# Patient Record
Sex: FEMALE | Race: WHITE | Employment: FULL TIME | URBAN - METROPOLITAN AREA
[De-identification: names, ages, dates, MRNs, and addresses within clinical notes are randomized per-mention and may not be internally consistent; named-entity substitution may affect disease eponyms.]

---

## 2017-01-01 ENCOUNTER — OFFICE VISIT (OUTPATIENT)
Dept: FAMILY MEDICINE | Facility: CLINIC | Age: 62
End: 2017-01-01
Payer: COMMERCIAL

## 2017-01-01 ENCOUNTER — THERAPY VISIT (OUTPATIENT)
Dept: PHYSICAL THERAPY | Facility: CLINIC | Age: 62
End: 2017-01-01
Payer: COMMERCIAL

## 2017-01-01 ENCOUNTER — OFFICE VISIT (OUTPATIENT)
Dept: ORTHOPEDICS | Facility: CLINIC | Age: 62
End: 2017-01-01
Payer: COMMERCIAL

## 2017-01-01 ENCOUNTER — TRANSFERRED RECORDS (OUTPATIENT)
Dept: HEALTH INFORMATION MANAGEMENT | Facility: CLINIC | Age: 62
End: 2017-01-01

## 2017-01-01 ENCOUNTER — TELEPHONE (OUTPATIENT)
Dept: ORTHOPEDICS | Facility: CLINIC | Age: 62
End: 2017-01-01

## 2017-01-01 ENCOUNTER — ANESTHESIA (OUTPATIENT)
Dept: SURGERY | Facility: CLINIC | Age: 62
End: 2017-01-01
Payer: COMMERCIAL

## 2017-01-01 ENCOUNTER — SURGERY (OUTPATIENT)
Age: 62
End: 2017-01-01

## 2017-01-01 ENCOUNTER — HOSPITAL ENCOUNTER (OUTPATIENT)
Dept: MRI IMAGING | Facility: CLINIC | Age: 62
Discharge: HOME | End: 2017-07-06
Attending: ORTHOPAEDIC SURGERY | Admitting: ORTHOPAEDIC SURGERY
Payer: COMMERCIAL

## 2017-01-01 ENCOUNTER — ANESTHESIA EVENT (OUTPATIENT)
Dept: SURGERY | Facility: CLINIC | Age: 62
End: 2017-01-01
Payer: COMMERCIAL

## 2017-01-01 ENCOUNTER — HOSPITAL ENCOUNTER (OUTPATIENT)
Facility: CLINIC | Age: 62
End: 2017-01-01
Attending: ORTHOPAEDIC SURGERY | Admitting: ORTHOPAEDIC SURGERY
Payer: COMMERCIAL

## 2017-01-01 ENCOUNTER — HOSPITAL ENCOUNTER (OUTPATIENT)
Dept: MAMMOGRAPHY | Facility: CLINIC | Age: 62
Discharge: HOME | End: 2017-11-03
Attending: FAMILY MEDICINE | Admitting: FAMILY MEDICINE
Payer: COMMERCIAL

## 2017-01-01 ENCOUNTER — TELEPHONE (OUTPATIENT)
Dept: FAMILY MEDICINE | Facility: CLINIC | Age: 62
End: 2017-01-01

## 2017-01-01 ENCOUNTER — HOSPITAL ENCOUNTER (OUTPATIENT)
Facility: CLINIC | Age: 62
Discharge: HOME | End: 2017-07-24
Attending: PATHOLOGY | Admitting: PATHOLOGY
Payer: COMMERCIAL

## 2017-01-01 ENCOUNTER — HEALTH MAINTENANCE LETTER (OUTPATIENT)
Age: 62
End: 2017-01-01

## 2017-01-01 ENCOUNTER — TELEPHONE (OUTPATIENT)
Dept: CARDIOLOGY | Facility: CLINIC | Age: 62
End: 2017-01-01

## 2017-01-01 ENCOUNTER — RADIANT APPOINTMENT (OUTPATIENT)
Dept: GENERAL RADIOLOGY | Facility: CLINIC | Age: 62
End: 2017-01-01
Attending: ORTHOPAEDIC SURGERY
Payer: COMMERCIAL

## 2017-01-01 VITALS
HEART RATE: 55 BPM | HEIGHT: 66 IN | SYSTOLIC BLOOD PRESSURE: 126 MMHG | DIASTOLIC BLOOD PRESSURE: 78 MMHG | OXYGEN SATURATION: 96 % | BODY MASS INDEX: 30.86 KG/M2 | WEIGHT: 192 LBS | TEMPERATURE: 97.8 F

## 2017-01-01 VITALS
WEIGHT: 192.9 LBS | HEART RATE: 61 BPM | OXYGEN SATURATION: 97 % | BODY MASS INDEX: 31 KG/M2 | HEIGHT: 66 IN | DIASTOLIC BLOOD PRESSURE: 78 MMHG | SYSTOLIC BLOOD PRESSURE: 124 MMHG | TEMPERATURE: 98 F

## 2017-01-01 VITALS
WEIGHT: 198 LBS | BODY MASS INDEX: 31.08 KG/M2 | HEIGHT: 67 IN | SYSTOLIC BLOOD PRESSURE: 126 MMHG | DIASTOLIC BLOOD PRESSURE: 70 MMHG

## 2017-01-01 VITALS
WEIGHT: 193 LBS | TEMPERATURE: 98.2 F | HEART RATE: 68 BPM | OXYGEN SATURATION: 99 % | DIASTOLIC BLOOD PRESSURE: 80 MMHG | BODY MASS INDEX: 30.29 KG/M2 | HEIGHT: 67 IN | SYSTOLIC BLOOD PRESSURE: 120 MMHG

## 2017-01-01 VITALS
RESPIRATION RATE: 15 BRPM | SYSTOLIC BLOOD PRESSURE: 126 MMHG | OXYGEN SATURATION: 97 % | BODY MASS INDEX: 30.29 KG/M2 | DIASTOLIC BLOOD PRESSURE: 75 MMHG | WEIGHT: 193 LBS | HEIGHT: 67 IN | HEART RATE: 58 BPM | TEMPERATURE: 98.1 F

## 2017-01-01 VITALS
WEIGHT: 198 LBS | TEMPERATURE: 98.2 F | BODY MASS INDEX: 31.01 KG/M2 | SYSTOLIC BLOOD PRESSURE: 138 MMHG | DIASTOLIC BLOOD PRESSURE: 82 MMHG | HEART RATE: 61 BPM | OXYGEN SATURATION: 97 %

## 2017-01-01 VITALS
DIASTOLIC BLOOD PRESSURE: 84 MMHG | OXYGEN SATURATION: 97 % | BODY MASS INDEX: 31.47 KG/M2 | SYSTOLIC BLOOD PRESSURE: 130 MMHG | WEIGHT: 200.9 LBS | TEMPERATURE: 97.5 F | HEART RATE: 60 BPM

## 2017-01-01 VITALS
HEIGHT: 67 IN | DIASTOLIC BLOOD PRESSURE: 84 MMHG | OXYGEN SATURATION: 97 % | HEART RATE: 84 BPM | SYSTOLIC BLOOD PRESSURE: 118 MMHG | WEIGHT: 200.9 LBS | BODY MASS INDEX: 31.53 KG/M2 | TEMPERATURE: 98.2 F

## 2017-01-01 VITALS
DIASTOLIC BLOOD PRESSURE: 82 MMHG | BODY MASS INDEX: 31.34 KG/M2 | WEIGHT: 195 LBS | SYSTOLIC BLOOD PRESSURE: 130 MMHG | HEIGHT: 66 IN

## 2017-01-01 VITALS — HEIGHT: 67 IN | WEIGHT: 198 LBS | BODY MASS INDEX: 31.08 KG/M2

## 2017-01-01 DIAGNOSIS — S46.219A BICEPS TENDON TEAR: ICD-10-CM

## 2017-01-01 DIAGNOSIS — D72.829 LEUKOCYTOSIS, UNSPECIFIED TYPE: ICD-10-CM

## 2017-01-01 DIAGNOSIS — I10 HYPERTENSION GOAL BP (BLOOD PRESSURE) < 140/90: ICD-10-CM

## 2017-01-01 DIAGNOSIS — C50.912 MALIGNANT NEOPLASM OF LEFT BREAST IN FEMALE, ESTROGEN RECEPTOR POSITIVE, UNSPECIFIED SITE OF BREAST (H): ICD-10-CM

## 2017-01-01 DIAGNOSIS — S46.112D RUPTURE LONG HEAD BICEPS TENDON, LEFT, SUBSEQUENT ENCOUNTER: ICD-10-CM

## 2017-01-01 DIAGNOSIS — B00.9 HSV (HERPES SIMPLEX VIRUS) INFECTION: ICD-10-CM

## 2017-01-01 DIAGNOSIS — J45.20 MILD INTERMITTENT ASTHMA WITHOUT COMPLICATION: ICD-10-CM

## 2017-01-01 DIAGNOSIS — Z01.818 PREOP GENERAL PHYSICAL EXAM: Primary | ICD-10-CM

## 2017-01-01 DIAGNOSIS — S46.112A LABRAL TEAR OF LONG HEAD OF BICEPS TENDON, LEFT, INITIAL ENCOUNTER: ICD-10-CM

## 2017-01-01 DIAGNOSIS — M25.512 ACUTE PAIN OF LEFT SHOULDER: Primary | ICD-10-CM

## 2017-01-01 DIAGNOSIS — I10 ESSENTIAL HYPERTENSION WITH GOAL BLOOD PRESSURE LESS THAN 140/90: ICD-10-CM

## 2017-01-01 DIAGNOSIS — M75.42 IMPINGEMENT SYNDROME OF LEFT SHOULDER: ICD-10-CM

## 2017-01-01 DIAGNOSIS — S40.022A HEMATOMA OF ARM, LEFT, INITIAL ENCOUNTER: ICD-10-CM

## 2017-01-01 DIAGNOSIS — E66.09 NON MORBID OBESITY DUE TO EXCESS CALORIES: ICD-10-CM

## 2017-01-01 DIAGNOSIS — D50.9 IRON DEFICIENCY ANEMIA, UNSPECIFIED IRON DEFICIENCY ANEMIA TYPE: ICD-10-CM

## 2017-01-01 DIAGNOSIS — Z12.11 SCREEN FOR COLON CANCER: ICD-10-CM

## 2017-01-01 DIAGNOSIS — S46.012A ROTATOR CUFF STRAIN, LEFT, INITIAL ENCOUNTER: ICD-10-CM

## 2017-01-01 DIAGNOSIS — M25.512 LEFT SHOULDER PAIN, UNSPECIFIED CHRONICITY: Primary | ICD-10-CM

## 2017-01-01 DIAGNOSIS — S46.002D ROTATOR CUFF INJURY, LEFT, SUBSEQUENT ENCOUNTER: ICD-10-CM

## 2017-01-01 DIAGNOSIS — M75.122 COMPLETE ROTATOR CUFF TEAR OR RUPTURE OF LEFT SHOULDER, NOT SPECIFIED AS TRAUMATIC: Primary | ICD-10-CM

## 2017-01-01 DIAGNOSIS — Z71.89 ADVANCED DIRECTIVES, COUNSELING/DISCUSSION: ICD-10-CM

## 2017-01-01 DIAGNOSIS — D75.839 THROMBOCYTOSIS: ICD-10-CM

## 2017-01-01 DIAGNOSIS — Z17.0 MALIGNANT NEOPLASM OF LEFT BREAST IN FEMALE, ESTROGEN RECEPTOR POSITIVE, UNSPECIFIED SITE OF BREAST (H): ICD-10-CM

## 2017-01-01 DIAGNOSIS — M75.122 COMPLETE TEAR OF LEFT ROTATOR CUFF: Primary | ICD-10-CM

## 2017-01-01 DIAGNOSIS — M75.122 COMPLETE ROTATOR CUFF TEAR OF LEFT SHOULDER: Primary | ICD-10-CM

## 2017-01-01 DIAGNOSIS — M25.512 ACUTE PAIN OF LEFT SHOULDER: ICD-10-CM

## 2017-01-01 DIAGNOSIS — Z13.6 CARDIOVASCULAR SCREENING; LDL GOAL LESS THAN 100: ICD-10-CM

## 2017-01-01 DIAGNOSIS — Z47.89 AFTERCARE FOLLOWING SURGERY OF THE MUSCULOSKELETAL SYSTEM: ICD-10-CM

## 2017-01-01 DIAGNOSIS — Z98.84 S/P GASTRIC BYPASS: ICD-10-CM

## 2017-01-01 DIAGNOSIS — Z00.01 ENCOUNTER FOR ROUTINE ADULT MEDICAL EXAM WITH ABNORMAL FINDINGS: Primary | ICD-10-CM

## 2017-01-01 DIAGNOSIS — D75.1 ERYTHROCYTOSIS: ICD-10-CM

## 2017-01-01 DIAGNOSIS — S46.212A STRAIN OF BICEPS TENDON, LEFT, INITIAL ENCOUNTER: Primary | ICD-10-CM

## 2017-01-01 DIAGNOSIS — D47.3 ESSENTIAL THROMBOCYTHEMIA (H): ICD-10-CM

## 2017-01-01 DIAGNOSIS — S46.212A STRAIN OF BICEPS TENDON, LEFT, INITIAL ENCOUNTER: ICD-10-CM

## 2017-01-01 DIAGNOSIS — J41.0 SIMPLE CHRONIC BRONCHITIS (H): ICD-10-CM

## 2017-01-01 DIAGNOSIS — Z12.31 VISIT FOR SCREENING MAMMOGRAM: ICD-10-CM

## 2017-01-01 LAB
ALBUMIN SERPL-MCNC: 3.4 G/DL (ref 3.4–5)
ALP SERPL-CCNC: 83 U/L (ref 40–150)
ALT SERPL W P-5'-P-CCNC: 15 U/L (ref 0–50)
ANION GAP SERPL CALCULATED.3IONS-SCNC: 14 MMOL/L (ref 3–14)
ANION GAP SERPL CALCULATED.3IONS-SCNC: 6 MMOL/L (ref 3–14)
AST SERPL W P-5'-P-CCNC: 17 U/L (ref 0–45)
BASOPHILS # BLD AUTO: 0.1 10E9/L (ref 0–0.2)
BASOPHILS # BLD AUTO: 0.2 10E9/L (ref 0–0.2)
BASOPHILS # BLD AUTO: 0.5 10E9/L (ref 0–0.2)
BASOPHILS NFR BLD AUTO: 0.8 %
BASOPHILS NFR BLD AUTO: 2.1 %
BASOPHILS NFR BLD AUTO: 3 %
BILIRUB SERPL-MCNC: 0.6 MG/DL (ref 0.2–1.3)
BUN SERPL-MCNC: 14 MG/DL (ref 7–30)
BUN SERPL-MCNC: 19 MG/DL (ref 7–30)
BURR CELLS BLD QL SMEAR: SLIGHT
CALCIUM SERPL-MCNC: 9.1 MG/DL (ref 8.5–10.1)
CALCIUM SERPL-MCNC: 9.7 MG/DL (ref 8.5–10.1)
CHLORIDE SERPL-SCNC: 96 MMOL/L (ref 94–109)
CHLORIDE SERPL-SCNC: 98 MMOL/L (ref 94–109)
CHOLEST SERPL-MCNC: 158 MG/DL
CO2 SERPL-SCNC: 22 MMOL/L (ref 20–32)
CO2 SERPL-SCNC: 31 MMOL/L (ref 20–32)
COPATH REPORT: NORMAL
CREAT SERPL-MCNC: 0.75 MG/DL (ref 0.52–1.04)
CREAT SERPL-MCNC: 0.76 MG/DL (ref 0.52–1.04)
CREAT UR-MCNC: 118 MG/DL
DIFFERENTIAL METHOD BLD: ABNORMAL
EOSINOPHIL # BLD AUTO: 0.2 10E9/L (ref 0–0.7)
EOSINOPHIL # BLD AUTO: 0.3 10E9/L (ref 0–0.7)
EOSINOPHIL # BLD AUTO: 0.5 10E9/L (ref 0–0.7)
EOSINOPHIL NFR BLD AUTO: 2.1 %
EOSINOPHIL NFR BLD AUTO: 2.6 %
EOSINOPHIL NFR BLD AUTO: 3 %
ERYTHROCYTE [DISTWIDTH] IN BLOOD BY AUTOMATED COUNT: 12.1 % (ref 10–15)
ERYTHROCYTE [DISTWIDTH] IN BLOOD BY AUTOMATED COUNT: 14.1 % (ref 10–15)
ERYTHROCYTE [DISTWIDTH] IN BLOOD BY AUTOMATED COUNT: 14.2 % (ref 10–15)
FOLATE SERPL-MCNC: 12.9 NG/ML
GFR SERPL CREATININE-BSD FRML MDRD: 78 ML/MIN/1.7M2
GFR SERPL CREATININE-BSD FRML MDRD: 79 ML/MIN/1.7M2
GLUCOSE SERPL-MCNC: 48 MG/DL (ref 70–99)
GLUCOSE SERPL-MCNC: 82 MG/DL (ref 70–99)
HCT VFR BLD AUTO: 42.9 % (ref 35–47)
HCT VFR BLD AUTO: 47.4 % (ref 35–47)
HCT VFR BLD AUTO: 47.9 % (ref 35–47)
HDLC SERPL-MCNC: 58 MG/DL
HGB BLD-MCNC: 15 G/DL (ref 11.7–15.7)
HGB BLD-MCNC: 16.1 G/DL (ref 11.7–15.7)
HGB BLD-MCNC: 16.5 G/DL (ref 11.7–15.7)
IMM GRANULOCYTES # BLD: 0.1 10E9/L (ref 0–0.4)
IMM GRANULOCYTES NFR BLD: 1 %
LDLC SERPL CALC-MCNC: 81 MG/DL
LYMPHOCYTES # BLD AUTO: 1.3 10E9/L (ref 0.8–5.3)
LYMPHOCYTES # BLD AUTO: 1.4 10E9/L (ref 0.8–5.3)
LYMPHOCYTES # BLD AUTO: 1.7 10E9/L (ref 0.8–5.3)
LYMPHOCYTES NFR BLD AUTO: 11 %
LYMPHOCYTES NFR BLD AUTO: 12.5 %
LYMPHOCYTES NFR BLD AUTO: 19.3 %
MCH RBC QN AUTO: 29.3 PG (ref 26.5–33)
MCH RBC QN AUTO: 29.9 PG (ref 26.5–33)
MCH RBC QN AUTO: 34.8 PG (ref 26.5–33)
MCHC RBC AUTO-ENTMCNC: 33.6 G/DL (ref 31.5–36.5)
MCHC RBC AUTO-ENTMCNC: 34.8 G/DL (ref 31.5–36.5)
MCHC RBC AUTO-ENTMCNC: 35 G/DL (ref 31.5–36.5)
MCV RBC AUTO: 100 FL (ref 78–100)
MCV RBC AUTO: 86 FL (ref 78–100)
MCV RBC AUTO: 87 FL (ref 78–100)
MICROALBUMIN UR-MCNC: 6 MG/L
MICROALBUMIN/CREAT UR: 4.92 MG/G CR (ref 0–25)
MONOCYTES # BLD AUTO: 0.7 10E9/L (ref 0–1.3)
MONOCYTES # BLD AUTO: 0.8 10E9/L (ref 0–1.3)
MONOCYTES # BLD AUTO: 0.9 10E9/L (ref 0–1.3)
MONOCYTES NFR BLD AUTO: 6 %
MONOCYTES NFR BLD AUTO: 7.3 %
MONOCYTES NFR BLD AUTO: 9 %
MYELOCYTES # BLD: 0.2 10E9/L
MYELOCYTES NFR BLD MANUAL: 1 %
NEUTROPHILS # BLD AUTO: 11.7 10E9/L (ref 1.6–8.3)
NEUTROPHILS # BLD AUTO: 5 10E9/L (ref 1.6–8.3)
NEUTROPHILS # BLD AUTO: 7.8 10E9/L (ref 1.6–8.3)
NEUTROPHILS NFR BLD AUTO: 68.8 %
NEUTROPHILS NFR BLD AUTO: 74.5 %
NEUTROPHILS NFR BLD AUTO: 76 %
NONHDLC SERPL-MCNC: 100 MG/DL
NRBC # BLD AUTO: 0 10*3/UL
NRBC BLD AUTO-RTO: 0 /100
PLATELET # BLD AUTO: 1067 10E9/L (ref 150–450)
PLATELET # BLD AUTO: 453 10E9/L (ref 150–450)
PLATELET # BLD AUTO: 957 10E9/L (ref 150–450)
PLATELET # BLD EST: ABNORMAL 10*3/UL
POTASSIUM SERPL-SCNC: 3.4 MMOL/L (ref 3.4–5.3)
POTASSIUM SERPL-SCNC: 4.6 MMOL/L (ref 3.4–5.3)
PROT SERPL-MCNC: 6.1 G/DL (ref 6.8–8.8)
RBC # BLD AUTO: 4.31 10E12/L (ref 3.8–5.2)
RBC # BLD AUTO: 5.5 10E12/L (ref 3.8–5.2)
RBC # BLD AUTO: 5.51 10E12/L (ref 3.8–5.2)
RETICS # AUTO: 93.5 10E9/L (ref 25–95)
RETICS/RBC NFR AUTO: 1.7 % (ref 0.5–2)
SODIUM SERPL-SCNC: 133 MMOL/L (ref 133–144)
SODIUM SERPL-SCNC: 134 MMOL/L (ref 133–144)
TRIGL SERPL-MCNC: 94 MG/DL
VIT B12 SERPL-MCNC: 1092 PG/ML (ref 193–986)
WBC # BLD AUTO: 10.4 10E9/L (ref 4–11)
WBC # BLD AUTO: 15.5 10E9/L (ref 4–11)
WBC # BLD AUTO: 7.2 10E9/L (ref 4–11)

## 2017-01-01 PROCEDURE — 85025 COMPLETE CBC W/AUTO DIFF WBC: CPT | Performed by: PATHOLOGY

## 2017-01-01 PROCEDURE — 73221 MRI JOINT UPR EXTREM W/O DYE: CPT | Mod: LT

## 2017-01-01 PROCEDURE — 40000795 ZZHCL STATISTIC DNA PROCESS AND HOLD: Performed by: PATHOLOGY

## 2017-01-01 PROCEDURE — 88185 FLOWCYTOMETRY/TC ADD-ON: CPT | Performed by: INTERNAL MEDICINE

## 2017-01-01 PROCEDURE — 85045 AUTOMATED RETICULOCYTE COUNT: CPT | Performed by: PATHOLOGY

## 2017-01-01 PROCEDURE — 85025 COMPLETE CBC W/AUTO DIFF WBC: CPT | Performed by: FAMILY MEDICINE

## 2017-01-01 PROCEDURE — 73030 X-RAY EXAM OF SHOULDER: CPT | Mod: LT | Performed by: ORTHOPAEDIC SURGERY

## 2017-01-01 PROCEDURE — 88305 TISSUE EXAM BY PATHOLOGIST: CPT | Mod: 26 | Performed by: INTERNAL MEDICINE

## 2017-01-01 PROCEDURE — 88305 TISSUE EXAM BY PATHOLOGIST: CPT | Performed by: INTERNAL MEDICINE

## 2017-01-01 PROCEDURE — 99396 PREV VISIT EST AGE 40-64: CPT | Performed by: FAMILY MEDICINE

## 2017-01-01 PROCEDURE — G0202 SCR MAMMO BI INCL CAD: HCPCS | Mod: 52

## 2017-01-01 PROCEDURE — 25000128 H RX IP 250 OP 636: Performed by: NURSE ANESTHETIST, CERTIFIED REGISTERED

## 2017-01-01 PROCEDURE — 85060 BLOOD SMEAR INTERPRETATION: CPT | Performed by: INTERNAL MEDICINE

## 2017-01-01 PROCEDURE — 85060 BLOOD SMEAR INTERPRETATION: CPT | Performed by: FAMILY MEDICINE

## 2017-01-01 PROCEDURE — 88311 DECALCIFY TISSUE: CPT | Mod: 26 | Performed by: INTERNAL MEDICINE

## 2017-01-01 PROCEDURE — 25000128 H RX IP 250 OP 636: Performed by: ANESTHESIOLOGY

## 2017-01-01 PROCEDURE — 38221 DX BONE MARROW BIOPSIES: CPT | Performed by: INTERNAL MEDICINE

## 2017-01-01 PROCEDURE — 85097 BONE MARROW INTERPRETATION: CPT | Performed by: INTERNAL MEDICINE

## 2017-01-01 PROCEDURE — 88271 CYTOGENETICS DNA PROBE: CPT | Performed by: PATHOLOGY

## 2017-01-01 PROCEDURE — 82607 VITAMIN B-12: CPT | Performed by: FAMILY MEDICINE

## 2017-01-01 PROCEDURE — 40000847 ZZHCL STATISTIC MORPHOLOGY W/INTERP HISTOLOGY TC 85060: Performed by: INTERNAL MEDICINE

## 2017-01-01 PROCEDURE — 88311 DECALCIFY TISSUE: CPT | Mod: 91 | Performed by: INTERNAL MEDICINE

## 2017-01-01 PROCEDURE — 99213 OFFICE O/P EST LOW 20 MIN: CPT | Performed by: FAMILY MEDICINE

## 2017-01-01 PROCEDURE — 88237 TISSUE CULTURE BONE MARROW: CPT | Performed by: INTERNAL MEDICINE

## 2017-01-01 PROCEDURE — 00000058 ZZHCL STATISTIC BONE MARROW ASP PERF TC 38220: Performed by: INTERNAL MEDICINE

## 2017-01-01 PROCEDURE — 80053 COMPREHEN METABOLIC PANEL: CPT | Performed by: NURSE PRACTITIONER

## 2017-01-01 PROCEDURE — 99213 OFFICE O/P EST LOW 20 MIN: CPT | Performed by: ORTHOPAEDIC SURGERY

## 2017-01-01 PROCEDURE — 99204 OFFICE O/P NEW MOD 45 MIN: CPT | Performed by: ORTHOPAEDIC SURGERY

## 2017-01-01 PROCEDURE — 97161 PT EVAL LOW COMPLEX 20 MIN: CPT | Mod: GP | Performed by: PHYSICAL THERAPIST

## 2017-01-01 PROCEDURE — 93000 ELECTROCARDIOGRAM COMPLETE: CPT | Performed by: NURSE PRACTITIONER

## 2017-01-01 PROCEDURE — 20610 DRAIN/INJ JOINT/BURSA W/O US: CPT | Mod: LT | Performed by: PHYSICIAN ASSISTANT

## 2017-01-01 PROCEDURE — 85025 COMPLETE CBC W/AUTO DIFF WBC: CPT | Performed by: NURSE PRACTITIONER

## 2017-01-01 PROCEDURE — 88264 CHROMOSOME ANALYSIS 20-25: CPT | Performed by: INTERNAL MEDICINE

## 2017-01-01 PROCEDURE — 88275 CYTOGENETICS 100-300: CPT | Performed by: PATHOLOGY

## 2017-01-01 PROCEDURE — 97110 THERAPEUTIC EXERCISES: CPT | Mod: GP | Performed by: PHYSICAL THERAPIST

## 2017-01-01 PROCEDURE — 40000424 ZZHCL STATISTIC BONE MARROW CORE PERF TC 38221: Performed by: INTERNAL MEDICINE

## 2017-01-01 PROCEDURE — 88313 SPECIAL STAINS GROUP 2: CPT | Mod: 26,59 | Performed by: INTERNAL MEDICINE

## 2017-01-01 PROCEDURE — 82043 UR ALBUMIN QUANTITATIVE: CPT | Performed by: NURSE PRACTITIONER

## 2017-01-01 PROCEDURE — 36415 COLL VENOUS BLD VENIPUNCTURE: CPT | Performed by: FAMILY MEDICINE

## 2017-01-01 PROCEDURE — 88313 SPECIAL STAINS GROUP 2: CPT | Performed by: INTERNAL MEDICINE

## 2017-01-01 PROCEDURE — 99214 OFFICE O/P EST MOD 30 MIN: CPT | Performed by: FAMILY MEDICINE

## 2017-01-01 PROCEDURE — 99213 OFFICE O/P EST LOW 20 MIN: CPT | Performed by: PHYSICIAN ASSISTANT

## 2017-01-01 PROCEDURE — A4565 SLINGS: HCPCS | Performed by: FAMILY MEDICINE

## 2017-01-01 PROCEDURE — 71000027 ZZH RECOVERY PHASE 2 EACH 15 MINS: Performed by: PATHOLOGY

## 2017-01-01 PROCEDURE — 80048 BASIC METABOLIC PNL TOTAL CA: CPT | Performed by: FAMILY MEDICINE

## 2017-01-01 PROCEDURE — 25000125 ZZHC RX 250: Performed by: PATHOLOGY

## 2017-01-01 PROCEDURE — 25000125 ZZHC RX 250: Performed by: NURSE ANESTHETIST, CERTIFIED REGISTERED

## 2017-01-01 PROCEDURE — 27210794 ZZH OR GENERAL SUPPLY STERILE: Performed by: PATHOLOGY

## 2017-01-01 PROCEDURE — 80061 LIPID PANEL: CPT | Performed by: FAMILY MEDICINE

## 2017-01-01 PROCEDURE — 36415 COLL VENOUS BLD VENIPUNCTURE: CPT | Performed by: NURSE PRACTITIONER

## 2017-01-01 PROCEDURE — 88280 CHROMOSOME KARYOTYPE STUDY: CPT | Performed by: INTERNAL MEDICINE

## 2017-01-01 PROCEDURE — 93000 ELECTROCARDIOGRAM COMPLETE: CPT | Performed by: FAMILY MEDICINE

## 2017-01-01 PROCEDURE — 40001005 ZZHCL STATISTIC FLOW >15 ABY TC 88189: Performed by: INTERNAL MEDICINE

## 2017-01-01 PROCEDURE — 99214 OFFICE O/P EST MOD 30 MIN: CPT | Performed by: NURSE PRACTITIONER

## 2017-01-01 PROCEDURE — 36415 COLL VENOUS BLD VENIPUNCTURE: CPT | Performed by: PATHOLOGY

## 2017-01-01 PROCEDURE — 88184 FLOWCYTOMETRY/ TC 1 MARKER: CPT | Performed by: INTERNAL MEDICINE

## 2017-01-01 PROCEDURE — 40000306 ZZH STATISTIC PRE PROC ASSESS II: Performed by: PATHOLOGY

## 2017-01-01 PROCEDURE — 36000050 ZZH SURGERY LEVEL 2 1ST 30 MIN: Performed by: PATHOLOGY

## 2017-01-01 PROCEDURE — 82746 ASSAY OF FOLIC ACID SERUM: CPT | Performed by: FAMILY MEDICINE

## 2017-01-01 PROCEDURE — 37000008 ZZH ANESTHESIA TECHNICAL FEE, 1ST 30 MIN: Performed by: PATHOLOGY

## 2017-01-01 PROCEDURE — 40000948 ZZHCL STATISTIC BONE MARROW ASP TC 85097: Performed by: INTERNAL MEDICINE

## 2017-01-01 PROCEDURE — 00000159 ZZHCL STATISTIC H-SEND OUTS PREP: Performed by: INTERNAL MEDICINE

## 2017-01-01 RX ORDER — HYDROCHLOROTHIAZIDE 25 MG/1
TABLET ORAL
Qty: 90 TABLET | Refills: 0 | Status: SHIPPED | OUTPATIENT
Start: 2017-01-01 | End: 2017-01-01

## 2017-01-01 RX ORDER — FLUTICASONE PROPIONATE 50 MCG
1-2 SPRAY, SUSPENSION (ML) NASAL DAILY PRN
COMMUNITY

## 2017-01-01 RX ORDER — FENTANYL CITRATE 50 UG/ML
INJECTION, SOLUTION INTRAMUSCULAR; INTRAVENOUS PRN
Status: DISCONTINUED | OUTPATIENT
Start: 2017-01-01 | End: 2017-01-01

## 2017-01-01 RX ORDER — LIDOCAINE 40 MG/G
CREAM TOPICAL
Status: DISCONTINUED | OUTPATIENT
Start: 2017-01-01 | End: 2017-01-01 | Stop reason: HOSPADM

## 2017-01-01 RX ORDER — PROPOFOL 10 MG/ML
INJECTION, EMULSION INTRAVENOUS PRN
Status: DISCONTINUED | OUTPATIENT
Start: 2017-01-01 | End: 2017-01-01

## 2017-01-01 RX ORDER — ACETAMINOPHEN AND CODEINE PHOSPHATE 300; 30 MG/1; MG/1
1-2 TABLET ORAL EVERY 4 HOURS PRN
Qty: 28 TABLET | Refills: 0 | Status: SHIPPED | OUTPATIENT
Start: 2017-01-01

## 2017-01-01 RX ORDER — HYDRALAZINE HYDROCHLORIDE 20 MG/ML
2.5-5 INJECTION INTRAMUSCULAR; INTRAVENOUS EVERY 10 MIN PRN
Status: DISCONTINUED | OUTPATIENT
Start: 2017-01-01 | End: 2017-01-01 | Stop reason: HOSPADM

## 2017-01-01 RX ORDER — NALOXONE HYDROCHLORIDE 0.4 MG/ML
.1-.4 INJECTION, SOLUTION INTRAMUSCULAR; INTRAVENOUS; SUBCUTANEOUS
Status: DISCONTINUED | OUTPATIENT
Start: 2017-01-01 | End: 2017-01-01 | Stop reason: HOSPADM

## 2017-01-01 RX ORDER — FENTANYL CITRATE 50 UG/ML
25-50 INJECTION, SOLUTION INTRAMUSCULAR; INTRAVENOUS
Status: DISCONTINUED | OUTPATIENT
Start: 2017-01-01 | End: 2017-01-01 | Stop reason: HOSPADM

## 2017-01-01 RX ORDER — PROPOFOL 10 MG/ML
INJECTION, EMULSION INTRAVENOUS CONTINUOUS PRN
Status: DISCONTINUED | OUTPATIENT
Start: 2017-01-01 | End: 2017-01-01

## 2017-01-01 RX ORDER — ONDANSETRON 2 MG/ML
4 INJECTION INTRAMUSCULAR; INTRAVENOUS EVERY 30 MIN PRN
Status: DISCONTINUED | OUTPATIENT
Start: 2017-01-01 | End: 2017-01-01 | Stop reason: HOSPADM

## 2017-01-01 RX ORDER — ACYCLOVIR 800 MG/1
TABLET ORAL
Qty: 18 TABLET | Refills: 12 | Status: SHIPPED | OUTPATIENT
Start: 2017-01-01

## 2017-01-01 RX ORDER — DEXAMETHASONE SODIUM PHOSPHATE 4 MG/ML
INJECTION, SOLUTION INTRA-ARTICULAR; INTRALESIONAL; INTRAMUSCULAR; INTRAVENOUS; SOFT TISSUE PRN
Status: DISCONTINUED | OUTPATIENT
Start: 2017-01-01 | End: 2017-01-01

## 2017-01-01 RX ORDER — SODIUM CHLORIDE, SODIUM LACTATE, POTASSIUM CHLORIDE, CALCIUM CHLORIDE 600; 310; 30; 20 MG/100ML; MG/100ML; MG/100ML; MG/100ML
INJECTION, SOLUTION INTRAVENOUS CONTINUOUS
Status: DISCONTINUED | OUTPATIENT
Start: 2017-01-01 | End: 2017-01-01 | Stop reason: HOSPADM

## 2017-01-01 RX ORDER — MEPERIDINE HYDROCHLORIDE 25 MG/ML
12.5 INJECTION INTRAMUSCULAR; INTRAVENOUS; SUBCUTANEOUS
Status: DISCONTINUED | OUTPATIENT
Start: 2017-01-01 | End: 2017-01-01 | Stop reason: HOSPADM

## 2017-01-01 RX ORDER — HYDROCHLOROTHIAZIDE 25 MG/1
TABLET ORAL
Qty: 90 TABLET | Refills: 1 | Status: SHIPPED | OUTPATIENT
Start: 2017-01-01

## 2017-01-01 RX ORDER — ACETAMINOPHEN AND CODEINE PHOSPHATE 300; 30 MG/1; MG/1
1-2 TABLET ORAL EVERY 4 HOURS PRN
Qty: 28 TABLET | Refills: 0 | Status: SHIPPED | OUTPATIENT
Start: 2017-01-01 | End: 2017-01-01

## 2017-01-01 RX ORDER — HYDROXYUREA 500 MG/1
CAPSULE ORAL DAILY
COMMUNITY

## 2017-01-01 RX ORDER — LIDOCAINE HYDROCHLORIDE 10 MG/ML
8-10 INJECTION, SOLUTION EPIDURAL; INFILTRATION; INTRACAUDAL; PERINEURAL
Status: DISCONTINUED | OUTPATIENT
Start: 2017-01-01 | End: 2017-01-01 | Stop reason: HOSPADM

## 2017-01-01 RX ORDER — METOPROLOL TARTRATE 25 MG/1
TABLET, FILM COATED ORAL
Qty: 90 TABLET | Refills: 0 | Status: SHIPPED | OUTPATIENT
Start: 2017-01-01 | End: 2017-01-01

## 2017-01-01 RX ORDER — AMLODIPINE BESYLATE 5 MG/1
TABLET ORAL
Qty: 90 TABLET | Refills: 0 | Status: SHIPPED | OUTPATIENT
Start: 2017-01-01 | End: 2017-01-01

## 2017-01-01 RX ORDER — ONDANSETRON 2 MG/ML
INJECTION INTRAMUSCULAR; INTRAVENOUS PRN
Status: DISCONTINUED | OUTPATIENT
Start: 2017-01-01 | End: 2017-01-01

## 2017-01-01 RX ORDER — ONDANSETRON 4 MG/1
4 TABLET, ORALLY DISINTEGRATING ORAL EVERY 30 MIN PRN
Status: DISCONTINUED | OUTPATIENT
Start: 2017-01-01 | End: 2017-01-01 | Stop reason: HOSPADM

## 2017-01-01 RX ORDER — LABETALOL HYDROCHLORIDE 5 MG/ML
10 INJECTION, SOLUTION INTRAVENOUS
Status: DISCONTINUED | OUTPATIENT
Start: 2017-01-01 | End: 2017-01-01 | Stop reason: HOSPADM

## 2017-01-01 RX ORDER — TRIAMCINOLONE ACETONIDE 40 MG/ML
80 INJECTION, SUSPENSION INTRA-ARTICULAR; INTRAMUSCULAR ONCE
Qty: 2 ML | Refills: 0 | OUTPATIENT
Start: 2017-01-01 | End: 2017-01-01

## 2017-01-01 RX ORDER — LIDOCAINE HYDROCHLORIDE 10 MG/ML
INJECTION, SOLUTION INFILTRATION; PERINEURAL PRN
Status: DISCONTINUED | OUTPATIENT
Start: 2017-01-01 | End: 2017-01-01

## 2017-01-01 RX ORDER — FLUMAZENIL 0.1 MG/ML
0.2 INJECTION, SOLUTION INTRAVENOUS
Status: DISCONTINUED | OUTPATIENT
Start: 2017-01-01 | End: 2017-01-01 | Stop reason: HOSPADM

## 2017-01-01 RX ORDER — BUPROPION HYDROCHLORIDE 150 MG/1
150 TABLET, FILM COATED, EXTENDED RELEASE ORAL 2 TIMES DAILY
Refills: 5 | COMMUNITY
Start: 2017-01-01

## 2017-01-01 RX ORDER — SUCRALFATE 1 G/1
1 TABLET ORAL 4 TIMES DAILY PRN
COMMUNITY

## 2017-01-01 RX ORDER — AMLODIPINE BESYLATE 5 MG/1
5 TABLET ORAL EVERY EVENING
Qty: 90 TABLET | Refills: 1 | Status: SHIPPED | OUTPATIENT
Start: 2017-01-01

## 2017-01-01 RX ORDER — DIMENHYDRINATE 50 MG/ML
25 INJECTION, SOLUTION INTRAMUSCULAR; INTRAVENOUS
Status: DISCONTINUED | OUTPATIENT
Start: 2017-01-01 | End: 2017-01-01 | Stop reason: HOSPADM

## 2017-01-01 RX ORDER — HYDROMORPHONE HYDROCHLORIDE 1 MG/ML
.3-.5 INJECTION, SOLUTION INTRAMUSCULAR; INTRAVENOUS; SUBCUTANEOUS EVERY 10 MIN PRN
Status: DISCONTINUED | OUTPATIENT
Start: 2017-01-01 | End: 2017-01-01 | Stop reason: HOSPADM

## 2017-01-01 RX ADMIN — LIDOCAINE HYDROCHLORIDE 5 ML: 10 INJECTION, SOLUTION EPIDURAL; INFILTRATION; INTRACAUDAL; PERINEURAL at 09:53

## 2017-01-01 RX ADMIN — MIDAZOLAM HYDROCHLORIDE 2 MG: 1 INJECTION, SOLUTION INTRAMUSCULAR; INTRAVENOUS at 09:36

## 2017-01-01 RX ADMIN — DEXAMETHASONE SODIUM PHOSPHATE 4 MG: 4 INJECTION, SOLUTION INTRA-ARTICULAR; INTRALESIONAL; INTRAMUSCULAR; INTRAVENOUS; SOFT TISSUE at 09:45

## 2017-01-01 RX ADMIN — ONDANSETRON 4 MG: 2 INJECTION INTRAMUSCULAR; INTRAVENOUS at 09:46

## 2017-01-01 RX ADMIN — FENTANYL CITRATE 50 MCG: 50 INJECTION, SOLUTION INTRAMUSCULAR; INTRAVENOUS at 09:44

## 2017-01-01 RX ADMIN — FENTANYL CITRATE 50 MCG: 50 INJECTION, SOLUTION INTRAMUSCULAR; INTRAVENOUS at 09:40

## 2017-01-01 RX ADMIN — PROPOFOL 100 MCG/KG/MIN: 10 INJECTION, EMULSION INTRAVENOUS at 09:44

## 2017-01-01 RX ADMIN — LIDOCAINE HYDROCHLORIDE 30 MG: 10 INJECTION, SOLUTION INFILTRATION; PERINEURAL at 09:40

## 2017-01-01 RX ADMIN — SODIUM CHLORIDE, POTASSIUM CHLORIDE, SODIUM LACTATE AND CALCIUM CHLORIDE: 600; 310; 30; 20 INJECTION, SOLUTION INTRAVENOUS at 09:36

## 2017-01-01 RX ADMIN — PROPOFOL 40 MG: 10 INJECTION, EMULSION INTRAVENOUS at 09:42

## 2017-01-01 ASSESSMENT — ANXIETY QUESTIONNAIRES
2. NOT BEING ABLE TO STOP OR CONTROL WORRYING: NOT AT ALL
3. WORRYING TOO MUCH ABOUT DIFFERENT THINGS: NOT AT ALL
2. NOT BEING ABLE TO STOP OR CONTROL WORRYING: NOT AT ALL
7. FEELING AFRAID AS IF SOMETHING AWFUL MIGHT HAPPEN: NOT AT ALL
1. FEELING NERVOUS, ANXIOUS, OR ON EDGE: NOT AT ALL
7. FEELING AFRAID AS IF SOMETHING AWFUL MIGHT HAPPEN: NOT AT ALL
GAD7 TOTAL SCORE: 1
5. BEING SO RESTLESS THAT IT IS HARD TO SIT STILL: NOT AT ALL
6. BECOMING EASILY ANNOYED OR IRRITABLE: NOT AT ALL
3. WORRYING TOO MUCH ABOUT DIFFERENT THINGS: NOT AT ALL
6. BECOMING EASILY ANNOYED OR IRRITABLE: NOT AT ALL
1. FEELING NERVOUS, ANXIOUS, OR ON EDGE: NOT AT ALL
5. BEING SO RESTLESS THAT IT IS HARD TO SIT STILL: NOT AT ALL
7. FEELING AFRAID AS IF SOMETHING AWFUL MIGHT HAPPEN: NOT AT ALL
2. NOT BEING ABLE TO STOP OR CONTROL WORRYING: NOT AT ALL
6. BECOMING EASILY ANNOYED OR IRRITABLE: SEVERAL DAYS
GAD7 TOTAL SCORE: 1
IF YOU CHECKED OFF ANY PROBLEMS ON THIS QUESTIONNAIRE, HOW DIFFICULT HAVE THESE PROBLEMS MADE IT FOR YOU TO DO YOUR WORK, TAKE CARE OF THINGS AT HOME, OR GET ALONG WITH OTHER PEOPLE: NOT DIFFICULT AT ALL
GAD7 TOTAL SCORE: 0
1. FEELING NERVOUS, ANXIOUS, OR ON EDGE: NOT AT ALL
5. BEING SO RESTLESS THAT IT IS HARD TO SIT STILL: NOT AT ALL
3. WORRYING TOO MUCH ABOUT DIFFERENT THINGS: NOT AT ALL

## 2017-01-01 ASSESSMENT — PATIENT HEALTH QUESTIONNAIRE - PHQ9
SUM OF ALL RESPONSES TO PHQ QUESTIONS 1-9: 0
5. POOR APPETITE OR OVEREATING: NOT AT ALL
SUM OF ALL RESPONSES TO PHQ QUESTIONS 1-9: 0
SUM OF ALL RESPONSES TO PHQ QUESTIONS 1-9: 1

## 2017-01-01 ASSESSMENT — ENCOUNTER SYMPTOMS
STRIDOR: 0
DYSRHYTHMIAS: 0
SEIZURES: 0

## 2017-01-01 ASSESSMENT — ASTHMA QUESTIONNAIRES
ACT_TOTALSCORE: 25
ACT_TOTALSCORE: 24

## 2017-01-01 ASSESSMENT — COPD QUESTIONNAIRES: COPD: 0

## 2017-01-01 ASSESSMENT — LIFESTYLE VARIABLES: TOBACCO_USE: 1

## 2017-02-02 PROBLEM — J41.0 SIMPLE CHRONIC BRONCHITIS (H): Status: ACTIVE | Noted: 2017-01-01

## 2017-05-08 NOTE — TELEPHONE ENCOUNTER
Rosetta Siddiqi contacted Stefany on 05/08/17 and left a message. If patient calls back please schedule appointment as soon as possible.  Raysa Siddiqi RN

## 2017-05-08 NOTE — TELEPHONE ENCOUNTER
BP Readings from Last 3 Encounters:   12/29/16 118/84   10/22/16 138/80   09/23/16 128/78     GFR Estimate   Date Value Ref Range Status   09/23/2016 >90  Non  GFR Calc   >60 mL/min/1.7m2 Final   05/18/2016 81 >60 mL/min/1.7m2 Final     Comment:     Non  GFR Calc   04/01/2016 >90  Non  GFR Calc   >60 mL/min/1.7m2 Final   done x 1 only. Pt needs recheck office visit before more refills. Please inform pt and  Please assist pt in making appt to be seen.

## 2017-05-09 NOTE — TELEPHONE ENCOUNTER
Received refill request for Metoprolol from [facility]. Pt hasn't been seen in our clinic since 4/2016 and at that time she was to follow up with us on an as needed basis with further care to be through PMD. Spoke with pharmacy to have the Rx refill sent to PMD, Dr Mcguire. [place]

## 2017-05-10 NOTE — TELEPHONE ENCOUNTER
Lopressor      Last Written Prescription Date: 09/23/2016  Last Fill Quantity: 90, # refills: 3  Last Office Visit with [facility], [facility] or [facility] prescribing provider: 12/29/2016  Next 5 appointments (look out 90 days)     Jun 24, 2017  8:30 AM CDT   SHORT with Lisa Mcguire MD   [facility] ([facility])    [address] [place] [id number] [phone]                   Potassium   Date Value Ref Range Status   09/23/2016 4.2 3.4 - 5.3 mmol/L Final     Creatinine   Date Value Ref Range Status   09/23/2016 0.65 0.52 - 1.04 mg/dL Final     BP Readings from Last 3 Encounters:   12/29/16 118/84   10/22/16 138/80   09/23/16 128/78

## 2017-05-11 NOTE — TELEPHONE ENCOUNTER
Prescription approved per [facility] Refill Protocol.  Raysa Siddiqi RN

## 2017-05-23 NOTE — NURSING NOTE
"Chief Complaint   Patient presents with     Shoulder Pain       Initial /84 (BP Location: Left arm, Patient Position: Chair, Cuff Size: Adult Large)  Pulse 60  Temp 97.5  F (36.4  C) (Oral)  Wt 200 lb 14.4 oz (91.1 kg)  LMP 09/21/2004  SpO2 97%  BMI 31.47 kg/m2 Estimated body mass index is 31.47 kg/(m^2) as calculated from the following:    Height as of 12/29/16: 5' 7\" (1.702 m).    Weight as of this encounter: 200 lb 14.4 oz (91.1 kg).  Medication Reconciliation: complete   Taina Ly CMA  "

## 2017-05-23 NOTE — PATIENT INSTRUCTIONS
Minimize aggravating factors to that shoulder.      Please see Selin Jay PA-C for discussion of an injection, can schedule with her before leaving clinic today.      Referral made for physical therapy also.  This is advised for continued treatment.     Please follow-up sooner if needed.

## 2017-05-23 NOTE — PROGRESS NOTES
SUBJECTIVE:                                                    Stefany Vega is a 61 year old female who presents to clinic today for the following health issues:      Joint Pain     Onset: x2 weeks    Description:   Location: left shoulder  Character: Sharp, Dull ache, Stabbing and Cramping    Intensity: moderate to severe    Progression of Symptoms: worse    Accompanying Signs & Symptoms:  Other symptoms: radiation of pain to elbow, numbness and tingling in hand   History:   Previous similar pain: no       Precipitating factors:   Trauma or overuse: no     Alleviating factors:  Improved by: rest/inactivity, heat, ice, massage and stretching       Therapies Tried and outcome: has tried heat, ice, massaging and stretching but only provides temporary relief    No particular injury that she can recall.  She denies a job that uses repetitive motion.    She reports that the pain radiates down to her elbow at times and can cause some tingling in her three middle fingers.  She denies any numbness in her arm or fingers and reports to having normal sensation.        Problem list and histories reviewed & adjusted, as indicated.  Additional history: as documented      ROS:  Constitutional, HEENT, cardiovascular, pulmonary, GI, , musculoskeletal, neuro, skin, endocrine and psych systems are negative, except as otherwise noted.    OBJECTIVE:                                                    /84 (BP Location: Left arm, Patient Position: Chair, Cuff Size: Adult Large)  Pulse 60  Temp 97.5  F (36.4  C) (Oral)  Wt 200 lb 14.4 oz (91.1 kg)  LMP 09/21/2004  SpO2 97%  BMI 31.47 kg/m2  Body mass index is 31.47 kg/(m^2).  GENERAL: healthy, alert and no distress  EYES: Eyes grossly normal to inspection, PERRL and conjunctivae and sclerae normal  MS: no gross musculoskeletal defects noted, no edema, no tenderness or weakness with the empty can test, Mild tenderness noted with the Hawkin's test  SKIN: no suspicious  lesions or rashes  NEURO: Normal strength and tone, mentation intact and speech normal  PSYCH: mentation appears normal, affect normal/bright    Diagnostic Test Results:  none      ASSESSMENT/PLAN:                                                      Stefany was seen today for shoulder pain.    Diagnoses and all orders for this visit:    Acute pain of left shoulder  -     PHYSICAL THERAPY REFERRAL    Advanced directives, counseling/discussion    - Normal sensation and strength in hands, arms and shoulders bilaterally.   - No tenderness or weakness with empty can test.    - Mild tenderness with the Hawkin's test     - Office visit with Selin Jay PA-C for possible shoulder injection as well as follow-up with Physical Therapy advised today.    - NSAIDs not advised due to patient's history of bariatric surgery.      - Patient understood the plan today.      See Patient Instructions:  Minimize aggravating factors to that shoulder.      Please see Selin Jay PA-C for discussion of an injection, can schedule with her before leaving clinic today.      Referral made for physical therapy also.  This is advised for continued treatment.     Please follow-up sooner if needed.          Concepcion Del Cid PA-C    [facility] [place]

## 2017-05-23 NOTE — MR AVS SNAPSHOT
After Visit Summary   5/23/2017    Stefany Vega    MRN: [id number]           Patient Information     Date Of Birth          1955        Visit Information        Provider Department      5/23/2017 3:40 PM Concepcion Del Cid PA-C [facility]        Today's Diagnoses     Acute pain of left shoulder    -  1    Advanced directives, counseling/discussion          Care Instructions    Minimize aggravating factors to that shoulder.      Please see Selin Jay PA-C for discussion of an injection, can schedule with her before leaving clinic today.      Referral made for physical therapy also.  This is advised for continued treatment.     Please follow-up sooner if needed.          Follow-ups after your visit        Additional Services     PHYSICAL THERAPY REFERRAL       *This therapy referral will be filtered to a centralized scheduling office at [facility] and the patient will receive a call to schedule an appointment at a [facility] location most convenient for them. *     [facility] provides Physical Therapy evaluation and treatment and many specialty services across the [facility] system.  If requesting a specialty program, please choose from the list below.    If you have not heard from the scheduling office within 2 business days, please call [phone] for all locations, with the exception of [place], please call [phone].  Treatment: Evaluation & Treatment  Special Instructions/Modalities: None  Special Programs: None    Please be aware that coverage of these services is subject to the terms and limitations of your health insurance plan.  Call member services at your health plan with any benefit or coverage questions.      **Note to Provider:  If you are referring outside of [facility] for the therapy appointment, please list the name of the location in the  special instructions  above, print the referral and give to the patient to  "schedule the appointment.                  Your next 10 appointments already scheduled     2017  8:30 AM CDT   SHORT with Lisa Mcguire MD   [facility] ([facility])    [address] [place] [id number] [phone]              Who to contact     If you have questions or need follow up information about today's clinic visit or your schedule please contact [facility] directly at [phone].  Normal or non-critical lab and imaging results will be communicated to you by MyChart, letter or phone within 4 business days after the clinic has received the results. If you do not hear from us within 7 days, please contact the clinic through [employer] or phone. If you have a critical or abnormal lab result, we will notify you by phone as soon as possible.  Submit refill requests through [employer] or call your pharmacy and they will forward the refill request to us. Please allow 3 business days for your refill to be completed.          Additional Information About Your Visit        [employer] Information     [employer] lets you send messages to your doctor, view your test results, renew your prescriptions, schedule appointments and more. To sign up, go to [email or web address] . Click on \"Log in\" on the left side of the screen, which will take you to the Welcome page. Then click on \"Sign up Now\" on the right side of the page.     You will be asked to enter the access code listed below, as well as some personal information. Please follow the directions to create your username and password.     Your access code is: [email or web address]  Expires: 2017  4:31 PM     Your access code will  in 90 days. If you need help or a new code, please call your [place] clinic or [phone].        Care EveryWhere ID     This is your Care EveryWhere ID. This could be used by other organizations to access your [facility] medical records  [id number]      "   Your Vitals Were     Pulse Temperature Last Period Pulse Oximetry BMI (Body Mass Index)       60 97.5  F (36.4  C) (Oral) 09/21/2004 97% 31.47 kg/m2        Blood Pressure from Last 3 Encounters:   05/23/17 130/84   12/29/16 118/84   10/22/16 138/80    Weight from Last 3 Encounters:   05/23/17 200 lb 14.4 oz (91.1 kg)   12/29/16 201 lb (91.2 kg)   10/22/16 212 lb (96.2 kg)              We Performed the Following     PHYSICAL THERAPY REFERRAL          Today's Medication Changes          These changes are accurate as of: 5/23/17  4:31 PM.  If you have any questions, ask your nurse or doctor.               These medicines have changed or have updated prescriptions.        Dose/Directions    acyclovir 800 MG tablet   Commonly known as:  ZOVIRAX   This may have changed:    - when to take this  - reasons to take this  - additional instructions   Used for:  HSV (herpes simplex virus) infection        Take one tablet by mouth three times daily  for 2 days   Quantity:  18 tablet   Refills:  12         Stop taking these medicines if you haven't already. Please contact your care team if you have questions.     albuterol 108 (90 BASE) MCG/ACT Inhaler   Commonly known as:  PROAIR HFA/PROVENTIL HFA/VENTOLIN HFA   Stopped by:  Concepcion Del Cid PA-C           beclomethasone 80 MCG/ACT Inhaler   Commonly known as:  QVAR   Stopped by:  Concepcion Del Cid PA-C           fluticasone-salmeterol 230-21 MCG/ACT inhaler   Commonly known as:  ADVAIR-HFA   Stopped by:  Concepcion Del Cid PA-C                    Primary Care Provider Office Phone # Fax #    Lisaolga Mcguire [phone]       [address] [address]        Thank you!     Thank you for choosing [facility]  for your care. Our goal is always to provide you with excellent care. Hearing back from our patients is one way we can continue to improve our services. Please take a few minutes to  complete the written survey that you may receive in the mail after your visit with us. Thank you!             Your Updated Medication List - Protect others around you: Learn how to safely use, store and throw away your medicines at www.disposemymeds.org.          This list is accurate as of: 5/23/17  4:31 PM.  Always use your most recent med list.                   Brand Name Dispense Instructions for use    acetaminophen-codeine 300-30 MG per tablet    TYLENOL #3    30 tablet    Take 1-2 tablets by mouth every 6 hours as needed for moderate pain       acyclovir 800 MG tablet    ZOVIRAX    18 tablet    Take one tablet by mouth three times daily  for 2 days       amLODIPine 5 MG tablet    NORVASC    90 tablet    TAKE ONE TABLET BY MOUTH ONE TIME DAILY       ANORO ELLIPTA 62.5-25 MCG/INH oral inhaler   Generic drug:  umeclidinium-vilanterol          buPROPion 150 MG 12 hr tablet    ZYBAN         calcium 600 + D 600-400 MG-UNIT per tablet   Generic drug:  calcium-vitamin D     60 tablet    Take 1 tablet by mouth 2 times daily       cetirizine 10 MG tablet    zyrTEC     Take 10 mg by mouth daily       cyanocolbalamin 500 MCG tablet    vitamin  B-12     Take 500 mcg by mouth daily       ferrous sulfate 142 (45 FE) MG Tbcr    SLO-FE    30 tablet    Take 142 mg by mouth every evening       fluticasone 50 MCG/ACT spray    FLONASE    48 mL    USE ONE OR TWO SPRAYS IN EACH NOSTRIL DAILY       glucosamine-chondroitin 500-400 MG Caps per capsule      Take 1 capsule by mouth At Bedtime       hydrochlorothiazide 25 MG tablet    HYDRODIURIL    90 tablet    TAKE 1 TABLET BY MOUTH EVERY DAY IN THE MORNING       HYDROcodone-acetaminophen 5-325 MG per tablet    NORCO    20 tablet    Take 1 tablet by mouth every 6 hours as needed for moderate to severe pain       metoprolol 25 MG tablet    LOPRESSOR    90 tablet    TAKE HALF TABLET BY MOUTH TWICE DAILY       mometasone 0.1 % cream    ELOCON    45 g    APPLY TOPICALLY TO HANDS FOR UP TO  14 DAYS AT A TIME       omeprazole 40 MG capsule    priLOSEC    180 capsule    TAKE ONE CAPSULE BY MOUTH TWICE DAILY       potassium 99 MG Tabs      Take 1 tablet by mouth daily       sucralfate 1 GM tablet    CARAFATE    270 tablet    Take 1 tablet (1 g) by mouth 3 times daily       Vitamin D (Cholecalciferol) 1000 UNITS Tabs      Take 1 tablet by mouth daily

## 2017-05-24 NOTE — MR AVS SNAPSHOT
"              After Visit Summary   5/24/2017    Stefany Vega    MRN: [id number]           Patient Information     Date Of Birth          1955        Visit Information        Provider Department      5/24/2017 3:00 PM Selin Jay PA-C [facility]        Today's Diagnoses     Acute pain of left shoulder    -  1    Rotator cuff strain, left, initial encounter        Impingement syndrome of left shoulder           Follow-ups after your visit        Your next 10 appointments already scheduled     Jun 24, 2017  8:30 AM CDT   SHORT with Lisa Mcguire MD   [facility]    [address] [place] [id number] [phone]              Who to contact     If you have questions or need follow up information about today's clinic visit or your schedule please contact [facility] directly at [phone].  Normal or non-critical lab and imaging results will be communicated to you by MyChart, letter or phone within 4 business days after the clinic has received the results. If you do not hear from us within 7 days, please contact the clinic through MyChart or phone. If you have a critical or abnormal lab result, we will notify you by phone as soon as possible.  Submit refill requests through [employer] or call your pharmacy and they will forward the refill request to us. Please allow 3 business days for your refill to be completed.          Additional Information About Your Visit        [employer] Information     [employer] lets you send messages to your doctor, view your test results, renew your prescriptions, schedule appointments and more. To sign up, go to [email or web address] . Click on \"Log in\" on the left side of the screen, which will take you to the Welcome page. Then click on \"Sign up Now\" on the right side of the page.     You will be asked to enter the access code listed below, as well as some " "personal information. Please follow the directions to create your username and password.     Your access code is: [email or web address]  Expires: 2017  4:31 PM     Your access code will  in 90 days. If you need help or a new code, please call your [place] clinic or [phone].        Care EveryWhere ID     This is your Care EveryWhere ID. This could be used by other organizations to access your [place] medical records  [id number]        Your Vitals Were     Pulse Temperature Height Last Period Pulse Oximetry BMI (Body Mass Index)    84 98.2  F (36.8  C) (Tympanic) 5' 7\" (1.702 m) 2004 97% 31.47 kg/m2       Blood Pressure from Last 3 Encounters:   17 118/84   17 130/84   16 118/84    Weight from Last 3 Encounters:   17 200 lb 14.4 oz (91.1 kg)   17 200 lb 14.4 oz (91.1 kg)   16 201 lb (91.2 kg)              We Performed the Following     Kenalog  80 MG         []     Large Joint/Bursa injection and/or drainage (Shoulder, Knee)          Today's Medication Changes          These changes are accurate as of: 17  3:56 PM.  If you have any questions, ask your nurse or doctor.               Start taking these medicines.        Dose/Directions    triamcinolone acetonide 40 MG/ML injection   Commonly known as:  KENALOG   Used for:  Acute pain of left shoulder, Rotator cuff strain, left, initial encounter, Impingement syndrome of left shoulder   Started by:  Selin Jay PA-C        Dose:  80 mg   2 mLs (80 mg) by INTRA-ARTICULAR route once for 1 dose   Quantity:  2 mL   Refills:  0         These medicines have changed or have updated prescriptions.        Dose/Directions    acyclovir 800 MG tablet   Commonly known as:  ZOVIRAX   This may have changed:    - when to take this  - reasons to take this  - additional instructions   Used for:  HSV (herpes simplex virus) infection        Take one tablet by mouth three times daily  for 2 days   Quantity:  18 tablet "   Refills:  12            Where to get your medicines      Some of these will need a paper prescription and others can be bought over the counter.  Ask your nurse if you have questions.     You don't need a prescription for these medications     triamcinolone acetonide 40 MG/ML injection                Primary Care Provider Office Phone # Fax #    Lisa Mcguire [phone]       [facility] [address] [place] [phone]        Thank you!     Thank you for choosing [facility]  for your care. Our goal is always to provide you with excellent care. Hearing back from our patients is one way we can continue to improve our services. Please take a few minutes to complete the written survey that you may receive in the mail after your visit with us. Thank you!             Your Updated Medication List - Protect others around you: Learn how to safely use, store and throw away your medicines at www.disposemymeds.org.          This list is accurate as of: 5/24/17  3:56 PM.  Always use your most recent med list.                   Brand Name Dispense Instructions for use    acetaminophen-codeine 300-30 MG per tablet    TYLENOL #3    30 tablet    Take 1-2 tablets by mouth every 6 hours as needed for moderate pain       acyclovir 800 MG tablet    ZOVIRAX    18 tablet    Take one tablet by mouth three times daily  for 2 days       amLODIPine 5 MG tablet    NORVASC    90 tablet    TAKE ONE TABLET BY MOUTH ONE TIME DAILY       ANORO ELLIPTA 62.5-25 MCG/INH oral inhaler   Generic drug:  umeclidinium-vilanterol          buPROPion 150 MG 12 hr tablet    ZYBAN         calcium 600 + D 600-400 MG-UNIT per tablet   Generic drug:  calcium-vitamin D     60 tablet    Take 1 tablet by mouth 2 times daily       cetirizine 10 MG tablet    zyrTEC     Take 10 mg by mouth daily       cyanocolbalamin 500 MCG tablet    vitamin  B-12     Take 500 mcg by mouth daily       ferrous sulfate  142 (45 FE) MG Tbcr    SLO-FE    30 tablet    Take 142 mg by mouth every evening       fluticasone 50 MCG/ACT spray    FLONASE    48 mL    USE ONE OR TWO SPRAYS IN EACH NOSTRIL DAILY       glucosamine-chondroitin 500-400 MG Caps per capsule      Take 1 capsule by mouth At Bedtime       hydrochlorothiazide 25 MG tablet    HYDRODIURIL    90 tablet    TAKE 1 TABLET BY MOUTH EVERY DAY IN THE MORNING       HYDROcodone-acetaminophen 5-325 MG per tablet    NORCO    20 tablet    Take 1 tablet by mouth every 6 hours as needed for moderate to severe pain       metoprolol 25 MG tablet    LOPRESSOR    90 tablet    TAKE HALF TABLET BY MOUTH TWICE DAILY       mometasone 0.1 % cream    ELOCON    45 g    APPLY TOPICALLY TO HANDS FOR UP TO 14 DAYS AT A TIME       omeprazole 40 MG capsule    priLOSEC    180 capsule    TAKE ONE CAPSULE BY MOUTH TWICE DAILY       potassium 99 MG Tabs      Take 1 tablet by mouth daily       sucralfate 1 GM tablet    CARAFATE    270 tablet    Take 1 tablet (1 g) by mouth 3 times daily       triamcinolone acetonide 40 MG/ML injection    KENALOG    2 mL    2 mLs (80 mg) by INTRA-ARTICULAR route once for 1 dose       Vitamin D (Cholecalciferol) 1000 UNITS Tabs      Take 1 tablet by mouth daily

## 2017-05-24 NOTE — NURSING NOTE
"Chief Complaint   Patient presents with     Shoulder Pain     injection in left shoulder        Initial /84  Pulse 84  Temp 98.2  F (36.8  C) (Tympanic)  Ht 5' 7\" (1.702 m)  Wt 200 lb 14.4 oz (91.1 kg)  LMP 09/21/2004  SpO2 97%  BMI 31.47 kg/m2 Estimated body mass index is 31.47 kg/(m^2) as calculated from the following:    Height as of this encounter: 5' 7\" (1.702 m).    Weight as of this encounter: 200 lb 14.4 oz (91.1 kg).  Medication Reconciliation: complete   Yesica Schroeder, SHANAE      "

## 2017-05-24 NOTE — PROGRESS NOTES
SUBJECTIVE:                                                    Stefany Vega is a 61 year old female who presents to clinic today for the following health issues:    Shoulder Pain  Stefany presents to clinic today with chief complaint of left shoulder pain. Pain has been ongoing for past ~2 weeks. Denies any trauma or falls precipitating shoulder pain. She was last seen by FRANCK Bliss for initial encounter of left shoulder pain. While no imaging at that time was done she was recommended PT and to follow up today for possible injection.   With further extrapolation of shoulder pain history patient reported the following: Pain in left shoulder has been ongoing for a number years. Symptoms wax and wane and has recently worsened in past ~2 weeks. Denies any particular precipitating factors including recent change in physical activities aggravating pain. She is left hand dominate - denies history of left shoulder pain. Describes left shoulder pain that radiates through bicep causing weakness with some numbness/ tingling in middle 3 fingers. Pain in shoulder is worse when lifting arm and when driving rotating wheel to make a right hand turn. Not sure if it is worth noting but reports sudden appearance of large bruise in left elbow this past December. Denies trauma or fall precipitating appearance of bruise. She states that she has tried OTC aleve to manage pain with little relief - unable to take ibuprofen due to allergy.     Problem list and histories reviewed & adjusted, as indicated.  Additional history: as documented    Patient Active Problem List   Diagnosis     Essential hypertension with goal blood pressure less than 140/90     Allergic state     Symptomatic menopausal or female climacteric states     Malignant neoplasm of left female breast, unspecified site of breast (H)- s/p left mastectomy     Other type of migraine     Open wound of foot, excluding toe(s)     TOBACCO USE DISORDER- quit again  6/2012- off and on still 2016     Mild intermittent asthma     Edema     Iron deficiency anemia     Exophthalmia     Non morbid obesity due to excess calories- prev. morbid obesity resolved s/p gastric bypass in 12/2009      Sleep apnea     RECURR Depressive disorder -PART REM [296.35]     Lobular Carcinoma in Situ of Breast-10/2009- - right breast     Lipoma of Skin and Subcutaneous Tissue-left arm      CARDIOVASCULAR SCREENING; LDL GOAL LESS THAN 100     Advanced directives, counseling/discussion     Ischemic colitis     Lupus anticoagulant (LAC) with hemorrhagic disorder- was on coumadin for 1 year, now off     Blood glucose abnormal     GERD (gastroesophageal reflux disease)     Blood in stool- has internal hemorrhoids, per [facility], does not need FIT     Intestinal malabsorption- secondary to gastric bypass     S/P gastric bypass     Simple chronic bronchitis (H)     Past Surgical History:   Procedure Laterality Date     Breast reconstruction Left 2/00     COLONOSCOPY  2010    fair prep - needs repeat in 2013 per gastro     ESOPHAGOSCOPY, GASTROSCOPY, DUODENOSCOPY (EGD), COMBINED  11/1/2013    Procedure: COMBINED ESOPHAGOSCOPY, GASTROSCOPY, DUODENOSCOPY (EGD), BIOPSY SINGLE OR MULTIPLE;  ESOPHAGOSCOPY, GASTROSCOPY, DUODENOSCOPY (EGD) with bx;  Surgeon: Jas Lai, DO;  Location:  GI     ESOPHAGOSCOPY, GASTROSCOPY, DUODENOSCOPY (EGD), COMBINED N/A 5/20/2016    Procedure: COMBINED ESOPHAGOSCOPY, GASTROSCOPY, DUODENOSCOPY (EGD);  Surgeon: Junior Romano MD;  Location:  GI     EXCISE LESION EYELID Right 12/24/2014    Procedure: EXCISE LESION EYELID;  Surgeon: Albin Acevedo MD;  [facility]     GASTRIC BYPASS  12/23/2009    Dr. Richards - [facility] - s/p laparoscopic Richard-en-y 2004     HYSTERECTOMY, YIFAN  7/2005    s/p YIAFN/BSO/fibroid/endometriosis-precancer from tamoxifen      incisional hernia with SB infarct and resection  9/05     Left mastectomy  2/00     LUMPECTOMY BREAST Right  10/2009     -  lobular carcinoma in-situ     LUMPECTOMY BREAST x2 Left 2/00     RECONSTRUCT EYELID Right 12/24/2014    Procedure: RECONSTRUCT EYELID;  Surgeon: Albin Acevedo MD;  Location:  [facility]     right achilles torn, tendon graft  2004     TONSILLECTOMY, ADENOIDECTOMY, COMBINED  1963     WEDGE RESECTION EYELID Right 9/12/2014    Procedure: WEDGE RESECTION EYELID;  Surgeon: Albin Acevedo MD;  Location:  [facility]       Social History   Substance Use Topics     Smoking status: Former Smoker     Packs/day: 0.30     Years: 40.00     Types: Cigarettes     Quit date: 6/5/2012     Smokeless tobacco: Never Used      Comment: started again 8/05 - strongly encourage cessation with every visit - needs to quit for weight loss surgery - noted 7/09      Alcohol use 0.0 oz/week     0 Standard drinks or equivalent per week      Comment: occ     Family History   Problem Relation Age of Onset     C.A.D. No family hx of      Cancer - colorectal No family hx of      Hypertension No family hx of      Cardiovascular Father      fluid around heart - takes HCTZ - pt doesn't think dx is CHF      CANCER Mother      CANCER Father      Cardiovascular Mother      DIABETES Mother      juanjose morales     Hypertension Mother      Hypertension Father      Hypertension Paternal Grandmother      C.A.D. Mother      juanjose morales     C.A.D. Paternal Grandmother          Current Outpatient Prescriptions   Medication Sig Dispense Refill     triamcinolone acetonide (KENALOG) 40 MG/ML injection 2 mLs (80 mg) by INTRA-ARTICULAR route once for 1 dose 2 mL 0     ANORO ELLIPTA 62.5-25 MCG/INH oral inhaler   11     buPROPion (ZYBAN) 150 MG 12 hr tablet   5     metoprolol (LOPRESSOR) 25 MG tablet TAKE HALF TABLET BY MOUTH TWICE DAILY 90 tablet 0     hydrochlorothiazide (HYDRODIURIL) 25 MG tablet TAKE 1 TABLET BY MOUTH EVERY DAY IN THE MORNING 90 tablet 0     amLODIPine (NORVASC) 5 MG tablet TAKE ONE TABLET BY MOUTH ONE TIME DAILY 90 tablet 0      omeprazole (PRILOSEC) 40 MG capsule TAKE ONE CAPSULE BY MOUTH TWICE DAILY 180 capsule 3     fluticasone (FLONASE) 50 MCG/ACT nasal spray USE ONE OR TWO SPRAYS IN EACH NOSTRIL DAILY 48 mL 2     acetaminophen-codeine (TYLENOL #3) 300-30 MG per tablet Take 1-2 tablets by mouth every 6 hours as needed for moderate pain 30 tablet 0     HYDROcodone-acetaminophen (NORCO) 5-325 MG per tablet Take 1 tablet by mouth every 6 hours as needed for moderate to severe pain 20 tablet 0     sucralfate (CARAFATE) 1 GM tablet Take 1 tablet (1 g) by mouth 3 times daily 270 tablet 3     mometasone (ELOCON) 0.1 % cream APPLY TOPICALLY TO HANDS FOR UP TO 14 DAYS AT A TIME  45 g 1     glucosamine-chondroitin 500-400 MG CAPS Take 1 capsule by mouth At Bedtime       potassium 99 MG TABS Take 1 tablet by mouth daily       cyanocolbalamin (VITAMIN  B-12) 500 MCG tablet Take 500 mcg by mouth daily       Vitamin D, Cholecalciferol, 1000 UNITS TABS Take 1 tablet by mouth daily       cetirizine (ZYRTEC) 10 MG tablet Take 10 mg by mouth daily        ferrous sulfate (SLO-FE) 142 (45 FE) MG TBCR Take 142 mg by mouth every evening  30 tablet      acyclovir (ZOVIRAX) 800 MG tablet Take one tablet by mouth three times daily  for 2 days (Patient taking differently: as needed Take one tablet by mouth three times daily  for 2 days) 18 tablet 12     calcium-vitamin D (CALCIUM 600 + D) 600-400 MG-UNIT per tablet Take 1 tablet by mouth 2 times daily 60 tablet      Allergies   Allergen Reactions     Amoxicillin Anaphylaxis     Penicillins Anaphylaxis and Swelling     Passed out with tongue swelling in 1980's , but has had augmentin in 2013 without any problems.      Chantix [Varenicline]      Bad nightmares      Estrogens      Can't be on secondary to HX of estrogen receptor positive breast cancer      Ibuprofen Rash     Lisinopril Cough       Reviewed and updated as needed this visit by clinical staff  Tobacco  Allergies  Meds  Med Hx       Reviewed and  "updated as needed this visit by Provider  Allergies  Med Hx         ROS:  Constitutional, HEENT, cardiovascular, pulmonary, GI, , musculoskeletal, neuro, skin, endocrine and psych systems are negative, except as otherwise noted.    This document serves as a record of the services and decisions personally performed and made by Selin Jay PA-C. It was created on her behalf by Bere Quiñonez, a trained medical scribe. The creation of this document is based the provider's statements to the medical scribe.  Bere Quiñonez, May 24, 2017 3:21 PM    OBJECTIVE:                                                    /84  Pulse 84  Temp 98.2  F (36.8  C) (Tympanic)  Ht 5' 7\" (1.702 m)  Wt 200 lb 14.4 oz (91.1 kg)  LMP 09/21/2004  SpO2 97%  BMI 31.47 kg/m2  Body mass index is 31.47 kg/(m^2).     GENERAL: healthy, alert and no distress  RESP: lungs clear to auscultation - no rales, rhonchi or wheezes  CV: regular rate and rhythm, normal S1 S2, no S3 or S4, no murmur, click or rub, no peripheral edema and peripheral pulses strong  MS: some atrophy of left bicep when compared to contralateral, proximal left bicep tenderness, full left shoulder ROM but pain in the impingement arc, positive hernandez edwin on the left, 4+/5 left shoulder strength regressive on third repetition   NEURO: Normal strength and tone, mentation intact and speech normal  PSYCH: mentation appears normal, affect normal/bright    Procedure: After discussing the risks, benefits and alternatives to a left subacromialcortisone injection the patient elected to proceed with the injection.  The posterior aspect of the left shoulder was prepped with a betadine solution.  Using a sterile technique and a 22 gauge needle, 4 cc's of 1% lidocaine, 4 cc's of 0.25% marcaine, and 80 mg in Kenalog were injected into the left subacromial.  The patient tolerated the procedure well and there were no immediate adverse effects.      Diagnostic Test Results:  none  "     ASSESSMENT/PLAN:                                                    Stefany was seen today for shoulder pain.    Diagnoses and all orders for this visit:    Acute pain of left shoulder, Rotator cuff strain, left, initial encounter, Impingement syndrome of left shoulder  Patient stable and doing well after injection. Discussed with patient concerns of possible underlying rotator cuff tear based on today's weakness on exam.  Advised patient to continue to use ice/ heat, take naproxen as needed, and follow up with PT referral. If after completion of PT arm weakness does not improve or pain returns will send patient for left shoulder MR.   -     Large Joint/Bursa injection and/or drainage (Shoulder, Knee)  -     Kenalog  80 MG         []  -     triamcinolone acetonide (KENALOG) 40 MG/ML injection; 2 mLs (80 mg) by INTRA-ARTICULAR route once for 1 dose      The information in this document, created by the medical scribe for me, accurately reflects the services I personally performed and the decisions made by me. I have reviewed and approved this document for accuracy prior to leaving the patient care area .  Selin Jay PA-C May 24, 2017 3:22 PM    Selin Jay PA-C  [facility]

## 2017-06-05 PROBLEM — M75.42 IMPINGEMENT SYNDROME OF LEFT SHOULDER: Status: ACTIVE | Noted: 2017-01-01

## 2017-06-05 PROBLEM — M25.512 ACUTE PAIN OF LEFT SHOULDER: Status: ACTIVE | Noted: 2017-01-01

## 2017-06-05 NOTE — MR AVS SNAPSHOT
"              After Visit Summary   6/5/2017    Stefany Vega    MRN: [id number]           Patient Information     Date Of Birth          1955        Visit Information        Provider Department      6/5/2017 7:20 AM Himanshu Bill PT [facility]        Today's Diagnoses     Acute pain of left shoulder    -  1    Impingement syndrome of left shoulder           Follow-ups after your visit        Your next 10 appointments already scheduled     Jun 12, 2017  7:20 AM CDT   OP[facility] Extremity with Hmianshu Bill PT   [facility] ([facility])    [address] [place] [id number] [phone]            Jun 24, 2017  8:30 AM CDT   [facility]    [address] [place] [id number] [phone]              Who to contact     If you have questions or need follow up information about today's clinic visit or your schedule please contact [facility] directly at [phone].  Normal or non-critical lab and imaging results will be communicated to you by [employer], letter or phone within 4 business days after the clinic has received the results. If you do not hear from us within 7 days, please contact the clinic through [employer] or phone. If you have a critical or abnormal lab result, we will notify you by phone as soon as possible.  Submit refill requests through [employer] or call your pharmacy and they will forward the refill request to us. Please allow 3 business days for your refill to be completed.          Additional Information About Your Visit        [employer] Information     [employer] lets you send messages to your doctor, view your test results, renew your prescriptions, schedule appointments and more. To sign up, go to [email or web address] . Click on \"Log in\" on the left side of the screen, which will take you to the Welcome " "page. Then click on \"Sign up Now\" on the right side of the page.     You will be asked to enter the access code listed below, as well as some personal information. Please follow the directions to create your username and password.     Your access code is: [email or web address]  Expires: 2017  4:31 PM     Your access code will  in 90 days. If you need help or a new code, please call your [facility] clinic or [phone].        Care EveryWhere ID     This is your Care EveryWhere ID. This could be used by other organizations to access your [facility] medical records  [id number]        Your Vitals Were     Last Period                   2004            Blood Pressure from Last 3 Encounters:   17 118/84   17 130/84   16 118/84    Weight from Last 3 Encounters:   17 91.1 kg (200 lb 14.4 oz)   17 91.1 kg (200 lb 14.4 oz)   16 91.2 kg (201 lb)              We Performed the Following     HC PT EVAL, LOW COMPLEXITY     OPAL INITIAL EVAL REPORT     THERAPEUTIC EXERCISES          Today's Medication Changes          These changes are accurate as of: 17  7:49 AM.  If you have any questions, ask your nurse or doctor.               These medicines have changed or have updated prescriptions.        Dose/Directions    acyclovir 800 MG tablet   Commonly known as:  ZOVIRAX   This may have changed:    - when to take this  - reasons to take this  - additional instructions   Used for:  HSV (herpes simplex virus) infection        Take one tablet by mouth three times daily  for 2 days   Quantity:  18 tablet   Refills:  12                Primary Care Provider Office Phone # Fax #    Lisa Mcguire [phone]       [address] [address]        Thank you!     Thank you for choosing [facility]  for your care. Our goal is always to provide you with excellent care. Hearing back from our patients is one " way we can continue to improve our services. Please take a few minutes to complete the written survey that you may receive in the mail after your visit with us. Thank you!             Your Updated Medication List - Protect others around you: Learn how to safely use, store and throw away your medicines at www.disposemymeds.org.          This list is accurate as of: 6/5/17  7:49 AM.  Always use your most recent med list.                   Brand Name Dispense Instructions for use    acetaminophen-codeine 300-30 MG per tablet    TYLENOL #3    30 tablet    Take 1-2 tablets by mouth every 6 hours as needed for moderate pain       acyclovir 800 MG tablet    ZOVIRAX    18 tablet    Take one tablet by mouth three times daily  for 2 days       amLODIPine 5 MG tablet    NORVASC    90 tablet    TAKE ONE TABLET BY MOUTH ONE TIME DAILY       ANORO ELLIPTA 62.5-25 MCG/INH oral inhaler   Generic drug:  umeclidinium-vilanterol          buPROPion 150 MG 12 hr tablet    ZYBAN         calcium 600 + D 600-400 MG-UNIT per tablet   Generic drug:  calcium-vitamin D     60 tablet    Take 1 tablet by mouth 2 times daily       cetirizine 10 MG tablet    zyrTEC     Take 10 mg by mouth daily       cyanocolbalamin 500 MCG tablet    vitamin  B-12     Take 500 mcg by mouth daily       ferrous sulfate 142 (45 FE) MG Tbcr    SLO-FE    30 tablet    Take 142 mg by mouth every evening       fluticasone 50 MCG/ACT spray    FLONASE    48 mL    USE ONE OR TWO SPRAYS IN EACH NOSTRIL DAILY       glucosamine-chondroitin 500-400 MG Caps per capsule      Take 1 capsule by mouth At Bedtime       hydrochlorothiazide 25 MG tablet    HYDRODIURIL    90 tablet    TAKE 1 TABLET BY MOUTH EVERY DAY IN THE MORNING       HYDROcodone-acetaminophen 5-325 MG per tablet    NORCO    20 tablet    Take 1 tablet by mouth every 6 hours as needed for moderate to severe pain       metoprolol 25 MG tablet    LOPRESSOR    90 tablet    TAKE HALF TABLET BY MOUTH TWICE DAILY        mometasone 0.1 % cream    ELOCON    45 g    APPLY TOPICALLY TO HANDS FOR UP TO 14 DAYS AT A TIME       omeprazole 40 MG capsule    priLOSEC    180 capsule    TAKE ONE CAPSULE BY MOUTH TWICE DAILY       potassium 99 MG Tabs      Take 1 tablet by mouth daily       sucralfate 1 GM tablet    CARAFATE    270 tablet    Take 1 tablet (1 g) by mouth 3 times daily       Vitamin D (Cholecalciferol) 1000 UNITS Tabs      Take 1 tablet by mouth daily

## 2017-06-05 NOTE — PROGRESS NOTES
Subjective:    HPI Comments: Patient is a 61 year old female who presents with complaints of L general shoulder pain, lack of motion, and lack of strength. Symptoms began 1 month ago with no known GEE and have improved since initial occurrence. Pain is described as achy and is currently rated as 2/10 best (rest) and 9/10 worst (reaching). Symptoms worsen with reaching, cleaning, lifting and improve with CI (12 days ago), rest. Pain is constant and worsens as the day goes on. Patient wishes to return to cooking, cleaning, walking, boating, and performing all ADL's without difficulty. Patient's general health is listed as good. PT has reviewed and confirmed patient's past medical history.    Patient is a 61 year old female presenting with rehab left shoulder hpi.                                      Pertinent medical history includes:  High blood pressure, cancer, overweight, smoking, migraines and anemia.  Medical allergies: yes (Penicillin, Ibuprofen, Lisinopol).  Other surgeries include:  Cancer surgery (L Breast mastectomy).  Current medications:  High blood pressure medication.  Current occupation   .  Patient is working in normal job without restrictions.  Primary job tasks include:  Prolonged sitting.    Barriers include:  None as reported by the patient.    Red flags:  None as reported by the patient.                        Objective:    System                   Shoulder Evaluation:  ROM:  AROM:  normal (pain with flex, ER, ext/IR)                                  Strength:    Flexion: Left:5-/5   Pain:    Right: 5/5     Pain:     Abduction:  Left: 5-/5   Pain:+    Right: 5/5     Pain:    Internal Rotation:  Left:5/5     Pain:    Right: 5/5     Pain:  External Rotation:   Left:4/5      Pain:+   Right:5/5     Pain:                                                     General     ROS    Assessment/Plan:      Patient is a 61 year old female with left side shoulder complaints.    Patient has the following  significant findings with corresponding treatment plan.                Diagnosis 1:  Shoulder Impingement  Pain -  self management, education and home program  Decreased strength - therapeutic exercise and home program  Impaired muscle performance - home program  Decreased function - home program    Therapy Evaluation Codes:   1) History comprised of:   Personal factors that impact the plan of care:      None.    Comorbidity factors that impact the plan of care are:      None.     Medications impacting care: None.  2) Examination of Body Systems comprised of:   Body structures and functions that impact the plan of care:      Shoulder.   Activity limitations that impact the plan of care are:      Bathing and Cooking.  3) Clinical presentation characteristics are:   Stable/Uncomplicated.  4) Decision-Making    Low complexity using standardized patient assessment instrument and/or measureable assessment of functional outcome.  Cumulative Therapy Evaluation is: Low complexity.    Previous and current functional limitations:  (See Goal Flow Sheet for this information)    Short term and Long term goals: (See Goal Flow Sheet for this information)     Communication ability:  Patient appears to be able to clearly communicate and understand verbal and written communication and follow directions correctly.  Treatment Explanation - The following has been discussed with the patient:   RX ordered/plan of care  Anticipated outcomes  Possible risks and side effects  This patient would benefit from PT intervention to resume normal activities.   Rehab potential is good.    Frequency:  2 X a month, once daily  Duration:  for 1 months tapering to 1 X a month over 1 months  Discharge Plan:  Achieve all LTG.  Independent in home treatment program.  Reach maximal therapeutic benefit.    Please refer to the daily flowsheet for treatment today, total treatment time and time spent performing 1:1 timed codes.

## 2017-07-03 NOTE — PROGRESS NOTES
SUBJECTIVE:                                                    Stefany Vega is a 61 year old female who presents to clinic today for the following health issues:    Arm Pain:     Onset: x2 days    Description:   Location: left arm  Character: Sharp, Dull ache - has bruising on left upper  arm- felt pain suddenly while picking up heavy grocery bag on 7/1/2017.     Intensity: moderately severe     Progression of Symptoms: worse    Accompanying Signs & Symptoms:  Other symptoms: radiation of pain to hand, numbness, tingling, warmth and redness    History:   Previous similar pain: no, but has had pain in her shoulder       Precipitating factors:   Trauma or overuse: no     Alleviating factors:  Improved by: rest/inactivity and ice.   Hx of left rotator cuff injury 1 month ago and cortisone shot - has been doing PT the last 3+ weeks.      Had a gastric bypass in 2009 and is not supposed to take NSAIDS.  Tylenol wasn't cutting her left rotator cuff injury.     Therapies Tried and outcome: Resting, propping it up, and Ice - no relief  Not on any blood thinners except for naproxen that she's been taking the last month.       Problem list and histories reviewed & adjusted, as indicated.  Additional history: as documented    Reviewed and updated as needed this visit by clinical staff  Tobacco  Allergies  Meds  Med Hx  Surg Hx  Fam Hx  Soc Hx      Reviewed and updated as needed this visit by Provider.          ROS:   ROS: 12 point ROS neg other than the symptoms noted above  Afebrile hx.     OBJECTIVE:                                                    /80 (BP Location: Right arm, Patient Position: Chair, Cuff Size: Adult Large)  Pulse 61  Temp 98.2  F (36.8  C) (Oral)  Wt 198 lb (89.8 kg)  LMP 09/21/2004  SpO2 97%  BMI 31.01 kg/m2  Body mass index is 31.01 kg/(m^2).   GENERAL: healthy, alert, well nourished, well hydrated, no distress  HENT: ear canals- normal; TMs- normal; Nose- normal; Mouth- no  ulcers, no lesions  NECK: no tenderness, no adenopathy, no asymmetry, no masses, no stiffness; thyroid- normal to palpation.   RESP: lungs clear to auscultation - no rales, no rhonchi, no wheezes  CV: regular rates and rhythm, normal S1 S2, no S3 or S4 and no murmur, no click or rub   MS: lower extremities- no gross deformities noted, no edema; there is a fairly large, moderately firm, only slightly fluctuant, but quite tender hematoma left medial upper arm.  Not warm to touch. Moderate swelling.  No signif. Tension to the left upper arm skin.   Left upper extremity: mild left deltoid and left supraspinatus weakness with muscle isolation ; 4/5 left biceps tendon weakness , feels like majority of distal left biceps tendon is intact, but is quite tender to the touch. 5/5 Intact triceps ,  strength and finger spread.  Did not test pronation and supination secondary to pt's discomfort.   Good radial and ulnar pulses and warm normal colored hand and forearm distal to the hematoma     Diagnostic test results:  none   Wt Readings from Last 5 Encounters:   07/03/17 198 lb (89.8 kg)   05/24/17 200 lb 14.4 oz (91.1 kg)   05/23/17 200 lb 14.4 oz (91.1 kg)   12/29/16 201 lb (91.2 kg)   10/22/16 212 lb (96.2 kg)         ASSESSMENT/PLAN:                                                        ICD-10-CM    1. Strain of biceps tendon, left, initial encounter- ? rupture of the medial head of distal end of the biceps tendon S46.212A ORTHO  REFERRAL     ARM SLING L     acetaminophen-codeine (TYLENOL/CODEINE #3) 300-30 MG per tablet   2. Hematoma of arm, left, initial encounter- ? secondary to rupture of medial end of distal biceps tendon  S40.022A ARM SLING L     acetaminophen-codeine (TYLENOL/CODEINE #3) 300-30 MG per tablet   3. Mild intermittent asthma without complication J45.20 Asthma Action Plan (AAP)   4. Malignant neoplasm of left breast in female, estrogen receptor positive, unspecified site of breast (H) C50.912      Z17.0    5. Simple chronic bronchitis (H) - well controlled currently  J41.0    6. Non morbid obesity due to excess calories- prev. morbid obesity resolved s/p gastric bypass- in 12/2009-down 14 pounds from fall 2016  E66.09      Please, call or return to clinic or go to the ER immediately if signs or symptoms worsen or fail to improve as anticipated.   Rest the affected painful area as much as possible.  Apply ice for 15-20 minutes intermittently as needed and especially after any offending activity. Daily stretching.  As pain recedes, begin normal activities slowly as tolerated.  Consider Physical Therapy if symptoms not better with symptomatic care.     Stop your naproxen secondary to your gastric bypass and risk for further bleeding.  Gave warning signs for compartment syndrome.   See Patient Instructions  Sling for comfort.          Lisa Mcguire MD    [facility]

## 2017-07-03 NOTE — MR AVS SNAPSHOT
After Visit Summary   7/3/2017    Stefany Vega    MRN: [id number]           Patient Information     Date Of Birth          1955        Visit Information        Provider Department      7/3/2017 4:30 PM Lisa Mcguire MD [facility] [place] [facility]        Today's Diagnoses     Strain of biceps tendon, left, initial encounter- ? rupture of the medial head of distal end of the biceps tendon    -  1    Hematoma of arm, left, initial encounter- ? secondary to rupture of medial end of distal biceps tendon         Mild intermittent asthma without complication          Care Instructions      Upper Extremity Contusion  You have a contusion (bruise) of an upper extremity (arm, wrist, hand, or fingers). Symptoms include pain, swelling, and skin discoloration. No bones are broken. This injury may take from a few days to a few weeks to heal.  During that time, the bruise may change from reddish in color, to purple-blue, to green-yellow, to yellow-brown.  Home care    Unless another medication was prescribed, you can take acetaminophen, ibuprofen, or naproxen to control pain. (If you have chronic liver or kidney disease or ever had a stomach ulcer or GI bleeding, talk with your doctor before using these medicines.)    Elevate the injured area to reduce pain and swelling. As much as possible, sit or lie down with the injured area raised about the level of your heart. This is especially important during the first 48 hours.    Ice the injured area to help reduce pain and swelling. Wrap a cold source (ice pack or ice cubes in a plastic bag) in a thin towel. Apply to the bruised area for 20 minutes every 1 to 2 hours the first day. Continue this 3 to 4 times a day until the pain and swelling goes away.    If a sling was provided, you may remove it to shower or bathe. To prevent joint stiffness, do not wear it for more than one week.  Follow up  Follow up with your health care provider or our staff as  advised. Call if you are not improving within the next 1 to 2 weeks.  When to seek medical advice   Call your health care provider right away if any of these occur:    Increased pain or swelling    Hand or fingers become cold, blue, numb or tingly    Signs of infection: Warmth, drainage, or increased redness or pain around the injury    Inability to move the injured body part     Frequent bruising for unknown reasons  Date Last Reviewed: 4/29/2015 2000-2017 [employer]. [address] [place] [address]. All rights reserved. This information is not intended as a substitute for professional medical care. Always follow your healthcare professional's instructions.      Please, call or return to clinic or go to the ER immediately if signs or symptoms worsen or fail to improve as anticipated.                Thank you for choosing [facility]  for your Health Care. It was a pleasure seeing you at your visit today. Please contact us with any questions or concerns you may have.                   Lisa Mcguire MD                                  To reach your [facility] care team after hours call:   [phone]    Our clinic hours are:     Monday- 7:30 am - 7:00 pm                             Tuesday through Friday- 7:30 am - 5:00 pm                                        Saturday- 8:00 am - 12:00 pm                  Phone:  [phone]    Our pharmacy hours are:     Monday  8:00 am to 7:00 pm      Tuesday through Friday 8:00am to 6:00pm                        Saturday - 9:00 am to 1:00 pm      Sunday : Closed.              Phone:  [phone]      There is also information available at our web site:  [email or web address]    If your provider ordered any lab tests and you do not receive the results within 10 business days, please call the clinic.    If you need a medication refill please contact your pharmacy.  Please allow 2 business days for your refill to  be completed.    Our clinic offers telephone visits and e visits.  Please ask one of your team members to explain more.      Use MyChart (secure email communication and access to your chart) to send your primary care provider a message or make an appointment. Ask someone on your Team how to sign up for [employer].                           Follow-ups after your visit        Additional Services     ORTHO  REFERRAL       [facility] is referring you to the Orthopedic  Services at [facility].       The  Representative will assist you in the coordination of your Orthopedic and Musculoskeletal Care as prescribed by your physician.    The  Representative will call you within 1 business day to help schedule your appointment, or you may contact the  Representative at:    All areas ~ [phone]     Type of Referral : Surgical / Specialist - Dr. Hiro Al, please.       Timeframe requested: 3 - 5 days    Coverage of these services is subject to the terms and limitations of your health insurance plan.  Please call member services at your health plan with any benefit or coverage questions.      If X-rays, CT or MRI's have been performed, please contact the facility where they were done to arrange for , prior to your scheduled appointment.  Please bring this referral request to your appointment and present it to your specialist.                  Your next 10 appointments already scheduled     Jul 24, 2017  7:20 AM CDT   [facility] Extremity with Himanshu Bill PT   [facility] ([facility] [place])    [address] [place] [address] [phone]              Who to contact     If you have questions or need follow up information about today's clinic visit or your schedule please contact [facility] directly at [phone].  Normal or non-critical lab and imaging results will be communicated to you by  "MyChart, letter or phone within 4 business days after the clinic has received the results. If you do not hear from us within 7 days, please contact the clinic through [employer] or phone. If you have a critical or abnormal lab result, we will notify you by phone as soon as possible.  Submit refill requests through [employer] or call your pharmacy and they will forward the refill request to us. Please allow 3 business days for your refill to be completed.          Additional Information About Your Visit        [employer] Information     [employer] lets you send messages to your doctor, view your test results, renew your prescriptions, schedule appointments and more. To sign up, go to [email or web address] . Click on \"Log in\" on the left side of the screen, which will take you to the Welcome page. Then click on \"Sign up Now\" on the right side of the page.     You will be asked to enter the access code listed below, as well as some personal information. Please follow the directions to create your username and password.     Your access code is: [email or web address]  Expires: 2017  4:31 PM     Your access code will  in 90 days. If you need help or a new code, please call your [facility] clinic or [phone].        Care EveryWhere ID     This is your Care EveryWhere ID. This could be used by other organizations to access your [facility] medical records  [id number]        Your Vitals Were     Pulse Temperature Last Period Pulse Oximetry BMI (Body Mass Index)       61 98.2  F (36.8  C) (Oral) 2004 97% 31.01 kg/m2        Blood Pressure from Last 3 Encounters:   17 152/80   17 118/84   17 130/84    Weight from Last 3 Encounters:   17 198 lb (89.8 kg)   17 200 lb 14.4 oz (91.1 kg)   17 200 lb 14.4 oz (91.1 kg)              We Performed the Following     ARM SLING L     Asthma Action Plan (AAP)     ORTHO  REFERRAL          Today's Medication Changes          These changes are " accurate as of: 7/3/17  4:58 PM.  If you have any questions, ask your nurse or doctor.               These medicines have changed or have updated prescriptions.        Dose/Directions    * acetaminophen-codeine 300-30 MG per tablet   Commonly known as:  TYLENOL #3   This may have changed:  Another medication with the same name was added. Make sure you understand how and when to take each.   Used for:  Other closed intra-articular fracture of distal end of right radius, initial encounter   Changed by:  Baldemar Posey MD        Dose:  1-2 tablet   Take 1-2 tablets by mouth every 6 hours as needed for moderate pain   Quantity:  30 tablet   Refills:  0       * acetaminophen-codeine 300-30 MG per tablet   Commonly known as:  TYLENOL/codeine #3   This may have changed:  You were already taking a medication with the same name, and this prescription was added. Make sure you understand how and when to take each.   Used for:  Strain of biceps tendon, left, initial encounter, Hematoma of arm, left, initial encounter   Changed by:  Lisa Mcguire MD        Dose:  1-2 tablet   Take 1-2 tablets by mouth every 4 hours as needed for mild pain   Quantity:  28 tablet   Refills:  0       acyclovir 800 MG tablet   Commonly known as:  ZOVIRAX   This may have changed:    - when to take this  - reasons to take this  - additional instructions   Used for:  HSV (herpes simplex virus) infection        Take one tablet by mouth three times daily  for 2 days   Quantity:  18 tablet   Refills:  12       * Notice:  This list has 2 medication(s) that are the same as other medications prescribed for you. Read the directions carefully, and ask your doctor or other care provider to review them with you.         Where to get your medicines      Some of these will need a paper prescription and others can be bought over the counter.  Ask your nurse if you have questions.     Bring a paper prescription for each of these medications      acetaminophen-codeine 300-30 MG per tablet                Primary Care Provider Office Phone # Fax #    Lisa Mcguire [phone]       [facility] [address] [address]        Equal Access to Services     RADHAANNA MARIE ANA : Hadii aad ku hadasho Soomaali, waaxda luqadaha, qaybta kaalmada adeegyada, waxay idiin hayaan adeeg kharash la'glenroy shen. So [facility] [phone].    ATENCIÓN: Si habla español, tiene a kenny disposición servicios gratuitos de asistencia lingüística. Llame al [phone].    We comply with applicable federal civil rights laws and [place] laws. We do not discriminate on the basis of race, color, national origin, age, disability sex, sexual orientation or gender identity.            Thank you!     Thank you for choosing [facility]  for your care. Our goal is always to provide you with excellent care. Hearing back from our patients is one way we can continue to improve our services. Please take a few minutes to complete the written survey that you may receive in the mail after your visit with us. Thank you!             Your Updated Medication List - Protect others around you: Learn how to safely use, store and throw away your medicines at www.disposemymeds.org.          This list is accurate as of: 7/3/17  4:58 PM.  Always use your most recent med list.                   Brand Name Dispense Instructions for use Diagnosis    * acetaminophen-codeine 300-30 MG per tablet    TYLENOL #3    30 tablet    Take 1-2 tablets by mouth every 6 hours as needed for moderate pain    Other closed intra-articular fracture of distal end of right radius, initial encounter       * acetaminophen-codeine 300-30 MG per tablet    TYLENOL/codeine #3    28 tablet    Take 1-2 tablets by mouth every 4 hours as needed for mild pain    Strain of biceps tendon, left, initial encounter, Hematoma of arm, left, initial encounter       acyclovir 800 MG tablet     ZOVIRAX    18 tablet    Take one tablet by mouth three times daily  for 2 days    HSV (herpes simplex virus) infection       ALEVE PO      Take by mouth as needed for moderate pain        amLODIPine 5 MG tablet    NORVASC    90 tablet    TAKE ONE TABLET BY MOUTH ONE TIME DAILY    Essential hypertension with goal blood pressure less than 140/90       ANORO ELLIPTA 62.5-25 MCG/INH oral inhaler   Generic drug:  umeclidinium-vilanterol           buPROPion 150 MG 12 hr tablet    ZYBAN          calcium 600 + D 600-400 MG-UNIT per tablet   Generic drug:  calcium-vitamin D     60 tablet    Take 1 tablet by mouth 2 times daily        cetirizine 10 MG tablet    zyrTEC     Take 10 mg by mouth daily        cyanocolbalamin 500 MCG tablet    vitamin  B-12     Take 500 mcg by mouth daily        ferrous sulfate 142 (45 FE) MG Tbcr    SLO-FE    30 tablet    Take 142 mg by mouth every evening        fluticasone 50 MCG/ACT spray    FLONASE    48 mL    USE ONE OR TWO SPRAYS IN EACH NOSTRIL DAILY    Allergic state       glucosamine-chondroitin 500-400 MG Caps per capsule      Take 1 capsule by mouth At Bedtime        hydrochlorothiazide 25 MG tablet    HYDRODIURIL    90 tablet    TAKE 1 TABLET BY MOUTH EVERY DAY IN THE MORNING    Essential hypertension with goal blood pressure less than 140/90       HYDROcodone-acetaminophen 5-325 MG per tablet    NORCO    20 tablet    Take 1 tablet by mouth every 6 hours as needed for moderate to severe pain    Closed buckle fracture of wrist, right, initial encounter       metoprolol 25 MG tablet    LOPRESSOR    90 tablet    TAKE HALF TABLET BY MOUTH TWICE DAILY    Essential hypertension with goal blood pressure less than 140/90       mometasone 0.1 % cream    ELOCON    45 g    APPLY TOPICALLY TO HANDS FOR UP TO 14 DAYS AT A TIME    Dermatitis       omeprazole 40 MG capsule    priLOSEC    180 capsule    TAKE ONE CAPSULE BY MOUTH TWICE DAILY    GERD (gastroesophageal reflux disease)       potassium 99  MG Tabs      Take 1 tablet by mouth daily        sucralfate 1 GM tablet    CARAFATE    270 tablet    Take 1 tablet (1 g) by mouth 3 times daily    GERD (gastroesophageal reflux disease), Dyspepsia, Morbid obesity (H)       Vitamin D (Cholecalciferol) 1000 UNITS Tabs      Take 1 tablet by mouth daily        * Notice:  This list has 2 medication(s) that are the same as other medications prescribed for you. Read the directions carefully, and ask your doctor or other care provider to review them with you.

## 2017-07-03 NOTE — LETTER
My Asthma Action Plan  Name: Stefany Vega   YOB: 1955  Date: 7/3/2017   My doctor: Lisa Mcguire MD   My clinic: [facility]        My Control Medicine: Anoro Ellipta 62.5-25 MCG Inhaler  My Rescue Medicine: None   My Asthma Severity: mild intermittent  Avoid your asthma triggers: Patient is aware of triggers.               GREEN ZONE   Good Control    I feel good    No cough or wheeze    Can work, sleep and play without asthma symptoms       Take your asthma control medicine every day.     1. If exercise triggers your asthma, take your rescue medication    15 minutes before exercise or sports, and    During exercise if you have asthma symptoms  2. Spacer to use with inhaler: If you have a spacer, make sure to use it with your inhaler             YELLOW ZONE Getting Worse  I have ANY of these:    I do not feel good    Cough or wheeze    Chest feels tight    Wake up at night   1. Keep taking your Green Zone medications  2. Start taking your rescue medicine:    every 20 minutes for up to 1 hour. Then every 4 hours for 24-48 hours.  3. If you stay in the Yellow Zone for more than 12-24 hours, contact your doctor.  4. If you do not return to the Green Zone in 12-24 hours or you get worse, start taking your oral steroid medicine if prescribed by your provider.           RED ZONE Medical Alert - Get Help  I have ANY of these:    I feel awful    Medicine is not helping    Breathing getting harder    Trouble walking or talking    Nose opens wide to breathe       1. Take your rescue medicine NOW  2. If your provider has prescribed an oral steroid medicine, start taking it NOW  3. Call your doctor NOW  4. If you are still in the Red Zone after 20 minutes and you have not reached your doctor:    Take your rescue medicine again and    Call 911 or go to the emergency room right away    See your regular doctor within 2 weeks of an Emergency Room or Urgent Care visit for follow-up  treatment.        Electronically signed by: Taina Ly, July 3, 2017    Annual Reminders:  Meet with Asthma Educator,  Flu Shot in the Fall, consider Pneumonia Vaccination for patients with asthma (aged 19 and older).    Pharmacy:    [facility] [address] [facility] [address] [place] [facility] - [place], [place] - [address]                    Asthma Triggers  How To Control Things That Make Your Asthma Worse    Triggers are things that make your asthma worse.  Look at the list below to help you find your triggers and what you can do about them.  You can help prevent asthma flare-ups by staying away from your triggers.      Trigger                                                          What you can do   Cigarette Smoke  Tobacco smoke can make asthma worse. Do not allow smoking in your home, car or around you.  Be sure no one smokes at a child s day care or school.  If you smoke, ask your health care provider for ways to help you quit.  Ask family members to quit too.  Ask your health care provider for a referral to Quit Plan to help you quit smoking, or call 1-515-780-PLAN.     Colds, Flu, Bronchitis  These are common triggers of asthma. Wash your hands often.  Don t touch your eyes, nose or mouth.  Get a flu shot every year.     Dust Mites  These are tiny bugs that live in cloth or carpet. They are too small to see. Wash sheets and blankets in hot water every week.   Encase pillows and mattress in dust mite proof covers.  Avoid having carpet if you can. If you have carpet, vacuum weekly.   Use a dust mask and HEPA vacuum.   Pollen and Outdoor Mold  Some people are allergic to trees, grass, or weed pollen, or molds. Try to keep your windows closed.  Limit time out doors when pollen count is high.   Ask you health care provider about taking medicine during allergy season.     Animal Dander  Some people are allergic to skin flakes, urine or saliva from pets with fur or  feathers. Keep pets with fur or feathers out of your home.    If you can t keep the pet outdoors, then keep the pet out of your bedroom.  Keep the bedroom door closed.  Keep pets off cloth furniture and away from stuffed toys.     Mice, Rats, and Cockroaches  Some people are allergic to the waste from these pests.   Cover food and garbage.  Clean up spills and food crumbs.  Store grease in the refrigerator.   Keep food out of the bedroom.   Indoor Mold  This can be a trigger if your home has high moisture. Fix leaking faucets, pipes, or other sources of water.   Clean moldy surfaces.  Dehumidify basement if it is damp and smelly.   Smoke, Strong Odors, and Sprays  These can reduce air quality. Stay away from strong odors and sprays, such as perfume, powder, hair spray, paints, smoke incense, paint, cleaning products, candles and new carpet.   Exercise or Sports  Some people with asthma have this trigger. Be active!  Ask your doctor about taking medicine before sports or exercise to prevent symptoms.    Warm up for 5-10 minutes before and after sports or exercise.     Other Triggers of Asthma  Cold air:  Cover your nose and mouth with a scarf.  Sometimes laughing or crying can be a trigger.  Some medicines and food can trigger asthma.

## 2017-07-03 NOTE — NURSING NOTE
"Chief Complaint   Patient presents with     Shoulder Pain       Initial /80 (BP Location: Right arm, Patient Position: Chair, Cuff Size: Adult Large)  Pulse 61  Temp 98.2  F (36.8  C) (Oral)  Wt 198 lb (89.8 kg)  LMP 09/21/2004  SpO2 97%  BMI 31.01 kg/m2 Estimated body mass index is 31.01 kg/(m^2) as calculated from the following:    Height as of 5/24/17: 5' 7\" (1.702 m).    Weight as of this encounter: 198 lb (89.8 kg).  Medication Reconciliation: complete   Taina Ly CMA  "

## 2017-07-03 NOTE — PATIENT INSTRUCTIONS
Upper Extremity Contusion  You have a contusion (bruise) of an upper extremity (arm, wrist, hand, or fingers). Symptoms include pain, swelling, and skin discoloration. No bones are broken. This injury may take from a few days to a few weeks to heal.  During that time, the bruise may change from reddish in color, to purple-blue, to green-yellow, to yellow-brown.  Home care    Unless another medication was prescribed, you can take acetaminophen, ibuprofen, or naproxen to control pain. (If you have chronic liver or kidney disease or ever had a stomach ulcer or GI bleeding, talk with your doctor before using these medicines.)    Elevate the injured area to reduce pain and swelling. As much as possible, sit or lie down with the injured area raised about the level of your heart. This is especially important during the first 48 hours.    Ice the injured area to help reduce pain and swelling. Wrap a cold source (ice pack or ice cubes in a plastic bag) in a thin towel. Apply to the bruised area for 20 minutes every 1 to 2 hours the first day. Continue this 3 to 4 times a day until the pain and swelling goes away.    If a sling was provided, you may remove it to shower or bathe. To prevent joint stiffness, do not wear it for more than one week.  Follow up  Follow up with your health care provider or our staff as advised. Call if you are not improving within the next 1 to 2 weeks.  When to seek medical advice   Call your health care provider right away if any of these occur:    Increased pain or swelling    Hand or fingers become cold, blue, numb or tingly    Signs of infection: Warmth, drainage, or increased redness or pain around the injury    Inability to move the injured body part     Frequent bruising for unknown reasons  Date Last Reviewed: 4/29/2015 2000-2017 [employer]. [address] [place] [address], [place] [address]. All rights reserved. This information is not intended as a substitute for professional  medical care. Always follow your healthcare professional's instructions.      Please, call or return to clinic or go to the ER immediately if signs or symptoms worsen or fail to improve as anticipated.                Thank you for choosing [facility]  for your Health Care. It was a pleasure seeing you at your visit today. Please contact us with any questions or concerns you may have.                   Lisa Mcguire MD                                  To reach your [facility] care team after hours call:   [phone]    Our clinic hours are:     Monday- 7:30 am - 7:00 pm                             Tuesday through Friday- 7:30 am - 5:00 pm                                        Saturday- 8:00 am - 12:00 pm                  Phone:  [phone]    Our pharmacy hours are:     Monday  8:00 am to 7:00 pm      Tuesday through Friday 8:00am to 6:00pm                        Saturday - 9:00 am to 1:00 pm      Sunday : Closed.              Phone:  [phone]      There is also information available at our web site:  [email or web address]    If your provider ordered any lab tests and you do not receive the results within 10 business days, please call the clinic.    If you need a medication refill please contact your pharmacy.  Please allow 2 business days for your refill to be completed.    Our clinic offers telephone visits and e visits.  Please ask one of your team members to explain more.      Use [employer] (secure email communication and access to your chart) to send your primary care provider a message or make an appointment. Ask someone on your Team how to sign up for [employer].

## 2017-07-06 NOTE — MR AVS SNAPSHOT
After Visit Summary   7/6/2017    Stefany Vega    MRN: [id number]           Patient Information     Date Of Birth          1955        Visit Information        Provider Department      7/6/2017 8:20 AM [facility]        Today's Diagnoses     Acute pain of left shoulder    -  1       Follow-ups after your visit        Your next 10 appointments already scheduled     Jul 06, 2017  9:40 AM CDT   MR SHOULDER LEFT W/O CONTRAST with RHMR1   [facility] MRI ([facility])    [address]  [facility] [address]   [phone]           Take your medicines as usual, unless your doctor tells you not to. Bring a list of your current medicines to your exam (including vitamins, minerals and over-the-counter drugs). Also bring the results of similar scans you may have had.  Please remove any body piercings and hair extensions before you arrive.  Follow your doctor s orders. If you do not, we may have to postpone your exam.  You will not have contrast for this exam. You do not need to do anything special to prepare.  The MRI machine uses a strong magnet. Please wear clothes without metal (snaps, zippers). A sweatsuit works well, or we may give you a hospital gown.   **IMPORTANT** THE INSTRUCTIONS BELOW ARE ONLY FOR THOSE PATIENTS WHO HAVE BEEN TOLD THEY WILL RECEIVE SEDATION OR GENERAL ANESTHESIA DURING THEIR MRI PROCEDURE:  IF YOU WILL RECEIVE SEDATION (take medicine to help you relax during your exam):   You must get the medicine from your doctor before you arrive. Bring the medicine to the exam. Do not take it at home.   Arrive one hour early. Bring someone who can take you home after the test. Your medicine will make you sleepy. After the exam, you may not drive, take a bus or take a taxi by yourself.   No eating 8 hours before your exam. You may have clear liquids up until 4 hours before your exam. (Clear liquids include water,  clear tea, black coffee and fruit juice without pulp.)  IF YOU WILL RECEIVE ANESTHESIA (be asleep for your exam):   Arrive 1 1/2 hours early. Bring someone who can take you home after the test. You may not drive, take a bus or take a taxi by yourself.   No eating 8 hours before your exam. You may have clear liquids up until 4 hours before your exam. (Clear liquids include water, clear tea, black coffee and fruit juice without pulp.)   You will spend four to five hours in the recovery room.  Please call the Imaging Department at your exam site with any questions.            Jul 10, 2017  8:50 AM CDT   Return Visit with Hiro Al MD   [facility] ORTHOPEDIC SURGERY ([facility])    [address]  [address]  [facility] [address]   [phone]            Jul 24, 2017  7:20 AM CDT   OPAL Extremity with Himanshu Bill PT   [facility] ([facility])    [address] [facility] [id number] [phone]              Future tests that were ordered for you today     Open Future Orders        Priority Expected Expires Ordered    MR Shoulder Left w/o Contrast Routine  7/6/2018 7/6/2017            Who to contact     If you have questions or need follow up information about today's clinic visit or your schedule please contact [facility] ORTHOPEDIC SURGERY directly at [phone].  Normal or non-critical lab and imaging results will be communicated to you by MyChart, letter or phone within 4 business days after the clinic has received the results. If you do not hear from us within 7 days, please contact the clinic through MyChart or phone. If you have a critical or abnormal lab result, we will notify you by phone as soon as possible.  Submit refill requests through [employer] or call your pharmacy and they will forward the refill request to us. Please allow 3 business days for your refill to be completed.          Additional Information About Your Visit       "  MyChart Information     [employer] lets you send messages to your doctor, view your test results, renew your prescriptions, schedule appointments and more. To sign up, go to [email or web address] . Click on \"Log in\" on the left side of the screen, which will take you to the Welcome page. Then click on \"Sign up Now\" on the right side of the page.     You will be asked to enter the access code listed below, as well as some personal information. Please follow the directions to create your username and password.     Your access code is: [email or web address]  Expires: 2017  4:31 PM     Your access code will  in 90 days. If you need help or a new code, please call your [facility] clinic or [phone].        Care EveryWhere ID     This is your Care EveryWhere ID. This could be used by other organizations to access your [facility] medical records  [id number]        Your Vitals Were     Height Last Period BMI (Body Mass Index)             5' 7\" (1.702 m) 2004 31.01 kg/m2          Blood Pressure from Last 3 Encounters:   17 138/82   17 118/84   17 130/84    Weight from Last 3 Encounters:   17 198 lb (89.8 kg)   17 198 lb (89.8 kg)   17 200 lb 14.4 oz (91.1 kg)                 Today's Medication Changes          These changes are accurate as of: 17  9:21 AM.  If you have any questions, ask your nurse or doctor.               These medicines have changed or have updated prescriptions.        Dose/Directions    acyclovir 800 MG tablet   Commonly known as:  ZOVIRAX   This may have changed:    - when to take this  - reasons to take this  - additional instructions   Used for:  HSV (herpes simplex virus) infection        Take one tablet by mouth three times daily  for 2 days   Quantity:  18 tablet   Refills:  12                Primary Care Provider Office Phone # Fax #    Lisa Mcguire [phone]       [address] " [facility] [address]        Equal Access to Services     [facility] HospitalST[facility]IE : Hadii aad ku hadjennio Soalbaali, waaxda luqadaha, qaybta kaalmada ingridcorinnezoë, waxdoreen leticia fitzpatrickjennifermago shen. So [facility] [phone].    ATENCIÓN: Si habla español, tiene a kenny disposición servicios gratuitos de asistencia lingüística. Arcenioame al [phone].    We comply with applicable federal civil rights laws and [place] laws. We do not discriminate on the basis of race, color, national origin, age, disability sex, sexual orientation or gender identity.            Thank you!     Thank you for choosing [facility] ORTHOPEDIC SURGERY  for your care. Our goal is always to provide you with excellent care. Hearing back from our patients is one way we can continue to improve our services. Please take a few minutes to complete the written survey that you may receive in the mail after your visit with us. Thank you!             Your Updated Medication List - Protect others around you: Learn how to safely use, store and throw away your medicines at www.disposemymeds.org.          This list is accurate as of: 7/6/17  9:21 AM.  Always use your most recent med list.                   Brand Name Dispense Instructions for use Diagnosis    * acetaminophen-codeine 300-30 MG per tablet    TYLENOL #3    30 tablet    Take 1-2 tablets by mouth every 6 hours as needed for moderate pain    Other closed intra-articular fracture of distal end of right radius, initial encounter       * acetaminophen-codeine 300-30 MG per tablet    TYLENOL/codeine #3    28 tablet    Take 1-2 tablets by mouth every 4 hours as needed for mild pain    Strain of biceps tendon, left, initial encounter, Hematoma of arm, left, initial encounter       acyclovir 800 MG tablet    ZOVIRAX    18 tablet    Take one tablet by mouth three times daily  for 2 days    HSV (herpes simplex virus) infection       ALEVE PO      Take by mouth as needed for moderate pain        amLODIPine 5 MG tablet     NORVASC    90 tablet    TAKE ONE TABLET BY MOUTH ONE TIME DAILY    Essential hypertension with goal blood pressure less than 140/90       ANORO ELLIPTA 62.5-25 MCG/INH oral inhaler   Generic drug:  umeclidinium-vilanterol           buPROPion 150 MG 12 hr tablet    ZYBAN          calcium 600 + D 600-400 MG-UNIT per tablet   Generic drug:  calcium-vitamin D     60 tablet    Take 1 tablet by mouth 2 times daily        cetirizine 10 MG tablet    zyrTEC     Take 10 mg by mouth daily        cyanocolbalamin 500 MCG tablet    vitamin  B-12     Take 500 mcg by mouth daily        ferrous sulfate 142 (45 FE) MG Tbcr    SLO-FE    30 tablet    Take 142 mg by mouth every evening        fluticasone 50 MCG/ACT spray    FLONASE    48 mL    USE ONE OR TWO SPRAYS IN EACH NOSTRIL DAILY    Allergic state       glucosamine-chondroitin 500-400 MG Caps per capsule      Take 1 capsule by mouth At Bedtime        hydrochlorothiazide 25 MG tablet    HYDRODIURIL    90 tablet    TAKE 1 TABLET BY MOUTH EVERY DAY IN THE MORNING    Essential hypertension with goal blood pressure less than 140/90       HYDROcodone-acetaminophen 5-325 MG per tablet    NORCO    20 tablet    Take 1 tablet by mouth every 6 hours as needed for moderate to severe pain    Closed buckle fracture of wrist, right, initial encounter       metoprolol 25 MG tablet    LOPRESSOR    90 tablet    TAKE HALF TABLET BY MOUTH TWICE DAILY    Essential hypertension with goal blood pressure less than 140/90       mometasone 0.1 % cream    ELOCON    45 g    APPLY TOPICALLY TO HANDS FOR UP TO 14 DAYS AT A TIME    Dermatitis       omeprazole 40 MG capsule    priLOSEC    180 capsule    TAKE ONE CAPSULE BY MOUTH TWICE DAILY    GERD (gastroesophageal reflux disease)       potassium 99 MG Tabs      Take 1 tablet by mouth daily        sucralfate 1 GM tablet    CARAFATE    270 tablet    Take 1 tablet (1 g) by mouth 3 times daily    GERD (gastroesophageal reflux disease), Dyspepsia, Morbid obesity  (H)       Vitamin D (Cholecalciferol) 1000 UNITS Tabs      Take 1 tablet by mouth daily        * Notice:  This list has 2 medication(s) that are the same as other medications prescribed for you. Read the directions carefully, and ask your doctor or other care provider to review them with you.

## 2017-07-06 NOTE — LETTER
7/6/2017         RE: Stefany Vega  [address] [facility] [address]        Dear Colleague,    Thank you for referring your patient, Stefany Vega, to the [facility]. Please see a copy of my visit note below.    HISTORY OF PRESENT ILLNESS:    Stefany Vega is a 61 year old female who is seen in consultation at the request of Dr. Mcguire for left shoulder injury that occurred on 7/1/2017. She was lifting and felt a sudden pop. Patient is left hand dominant.     Present symptoms: lots of bruising in left upper arm, left shoulder pain, tingling in the left hand  Treatments tried to this point: sling, Ice, Tylenol #3, Tylenol, had a cortisone injection on 5/24/17 by FRANCK Jean at [facility]  Orthopedic PMH: ***     Past Medical History:   Diagnosis Date     Abnormal glucose     175 nonfasting - hgb a1c 2/3/06 = 6.3 prior to gastric bypass     Breast cancer (H) 1999    Dr Recio -estrogen receptor positive tumor      Depression     was on citalopram - worked well - now off meds      Edema     left ankle      Essential hypertension 1999    has been on diovan- no longer covered by insurance, lisinopril = dry cough, cozaar =terrible headaches        Gastro-oesophageal reflux disease      Ischemic colitis (H) 11/2011     Lupus anticoagulant disorder (H)     on coumadin- seeing Dr. Kelly     Migraine     were menstrual , now a bit more cluster-like      Obesity, morbid (H)      JESICA (obstructive sleep apnea)     has not used CPAP since gastric bypass     Seasonal allergies     seasonal       Past Surgical History:   Procedure Laterality Date     Breast reconstruction Left 2/00     COLONOSCOPY  2010    fair prep - needs repeat in 2013 per gastro     ESOPHAGOSCOPY, GASTROSCOPY, DUODENOSCOPY (EGD), COMBINED  11/1/2013    Procedure: COMBINED ESOPHAGOSCOPY, GASTROSCOPY, DUODENOSCOPY (EGD), BIOPSY SINGLE OR MULTIPLE;  ESOPHAGOSCOPY, GASTROSCOPY,  DUODENOSCOPY (EGD) with bx;  Surgeon: Jas Lai DO;  Location:  GI     ESOPHAGOSCOPY, GASTROSCOPY, DUODENOSCOPY (EGD), COMBINED N/A 5/20/2016    Procedure: COMBINED ESOPHAGOSCOPY, GASTROSCOPY, DUODENOSCOPY (EGD);  Surgeon: Junior Romano MD;  Location:  GI     EXCISE LESION EYELID Right 12/24/2014    Procedure: EXCISE LESION EYELID;  Surgeon: Albin Acevedo MD;  Location: [facility]     GASTRIC BYPASS  12/23/2009    Dr. Richards - [facility] - s/p laparoscopic Richard-en-y 2004     HYSTERECTOMY, YIFAN  7/2005    s/p YIFAN/BSO/fibroid/endometriosis-precancer from tamoxifen      incisional hernia with SB infarct and resection  9/05     Left mastectomy  2/00     LUMPECTOMY BREAST Right 10/2009     -  lobular carcinoma in-situ     LUMPECTOMY BREAST x2 Left 2/00     RECONSTRUCT EYELID Right 12/24/2014    Procedure: RECONSTRUCT EYELID;  Surgeon: Albin Acevedo MD;  Location: [facility]     right achilles torn, tendon graft  2004     TONSILLECTOMY, ADENOIDECTOMY, COMBINED  1963     WEDGE RESECTION EYELID Right 9/12/2014    Procedure: WEDGE RESECTION EYELID;  Surgeon: Albin Acevedo MD;  Location:  [facility]       Family History   Problem Relation Age of Onset     C.A.D. No family hx of      Cancer - colorectal No family hx of      Hypertension No family hx of      Cardiovascular Father      fluid around heart - takes HCTZ - pt doesn't think dx is CHF      CANCER Mother      CANCER Father      Cardiovascular Mother      DIABETES Mother      juanjose moarles     Hypertension Mother      Hypertension Father      Hypertension Paternal Grandmother      C.A.D. Mother      juanjose morales     C.A.D. Paternal Grandmother        Social History     Social History     Marital status: Single     Spouse name: N/A     Number of children: 0     Years of education: N/A     Occupational History           Social History Main Topics     Smoking status: Former Smoker     Packs/day: 0.30     Years: 40.00     Types: Cigarettes      Quit date: 6/5/2012     Smokeless tobacco: Never Used      Comment: started again 8/05 - strongly encourage cessation with every visit - needs to quit for weight loss surgery - noted 7/09      Alcohol use 0.0 oz/week     0 Standard drinks or equivalent per week      Comment: occ     Drug use: No     Sexual activity: Yes     Partners: Male      Comment: post menopausal      Other Topics Concern     Caffeine Concern Yes     3 cups/day     Sleep Concern Yes     nocturia 3 x's     Special Diet No     Exercise No     always on the go     Seat Belt Yes     always      Self-Exams Yes     SBE encouraged monthly      Parent/Sibling W/ Cabg, Mi Or Angioplasty Before 65f 55m? No     Social History Narrative    calcium - 1 600mg plus 200-400 iu three times daily with individual vitamin D 1000 iu in am     colonoscopy -ordered today July 20, 2010     sun precautions - discussed     mammogram  - twice  yearly secondary to hx of breast cancer     Td booster - 11/28/2008    pneumovax -2005, will give booster July 20, 2010 secondary to asthma     DEXA -9/25/2009    stool hemoccults - every year after age 40    ASA- contraindicated secondary to gastric bypass     mulvitamin - encouraged        Current Outpatient Prescriptions   Medication Sig Dispense Refill     Naproxen Sodium (ALEVE PO) Take by mouth as needed for moderate pain       acetaminophen-codeine (TYLENOL/CODEINE #3) 300-30 MG per tablet Take 1-2 tablets by mouth every 4 hours as needed for mild pain 28 tablet 0     buPROPion (ZYBAN) 150 MG 12 hr tablet   5     metoprolol (LOPRESSOR) 25 MG tablet TAKE HALF TABLET BY MOUTH TWICE DAILY 90 tablet 0     hydrochlorothiazide (HYDRODIURIL) 25 MG tablet TAKE 1 TABLET BY MOUTH EVERY DAY IN THE MORNING 90 tablet 0     amLODIPine (NORVASC) 5 MG tablet TAKE ONE TABLET BY MOUTH ONE TIME DAILY 90 tablet 0     omeprazole (PRILOSEC) 40 MG capsule TAKE ONE CAPSULE BY MOUTH TWICE DAILY 180 capsule 3     acetaminophen-codeine (TYLENOL  #3) 300-30 MG per tablet Take 1-2 tablets by mouth every 6 hours as needed for moderate pain 30 tablet 0     HYDROcodone-acetaminophen (NORCO) 5-325 MG per tablet Take 1 tablet by mouth every 6 hours as needed for moderate to severe pain 20 tablet 0     sucralfate (CARAFATE) 1 GM tablet Take 1 tablet (1 g) by mouth 3 times daily 270 tablet 3     mometasone (ELOCON) 0.1 % cream APPLY TOPICALLY TO HANDS FOR UP TO 14 DAYS AT A TIME  45 g 1     glucosamine-chondroitin 500-400 MG CAPS Take 1 capsule by mouth At Bedtime       potassium 99 MG TABS Take 1 tablet by mouth daily       cyanocolbalamin (VITAMIN  B-12) 500 MCG tablet Take 500 mcg by mouth daily       Vitamin D, Cholecalciferol, 1000 UNITS TABS Take 1 tablet by mouth daily       cetirizine (ZYRTEC) 10 MG tablet Take 10 mg by mouth daily        ferrous sulfate (SLO-FE) 142 (45 FE) MG TBCR Take 142 mg by mouth every evening  30 tablet      acyclovir (ZOVIRAX) 800 MG tablet Take one tablet by mouth three times daily  for 2 days (Patient taking differently: as needed Take one tablet by mouth three times daily  for 2 days) 18 tablet 12     calcium-vitamin D (CALCIUM 600 + D) 600-400 MG-UNIT per tablet Take 1 tablet by mouth 2 times daily 60 tablet      ANORO ELLIPTA 62.5-25 MCG/INH oral inhaler   11     fluticasone (FLONASE) 50 MCG/ACT nasal spray USE ONE OR TWO SPRAYS IN EACH NOSTRIL DAILY (Patient not taking: Reported on 7/6/2017) 48 mL 2       Allergies   Allergen Reactions     Amoxicillin Anaphylaxis     Penicillins Anaphylaxis and Swelling     Passed out with tongue swelling in 1980's , but has had augmentin in 2013 without any problems.      Chantix [Varenicline]      Bad nightmares      Estrogens      Can't be on secondary to HX of estrogen receptor positive breast cancer      Ibuprofen Rash     Lisinopril Cough       REVIEW OF SYSTEMS:  CONSTITUTIONAL:  NEGATIVE for fever, chills, change in weight  INTEGUMENTARY/SKIN:  NEGATIVE for worrisome rashes, moles or  "lesions  EYES:  NEGATIVE for vision changes or irritation  ENT/MOUTH:  NEGATIVE for ear, mouth and throat problems  RESP:  NEGATIVE for significant cough or SOB  BREAST:  NEGATIVE for masses, tenderness or discharge  CV:  NEGATIVE for chest pain, palpitations or peripheral edema  GI:  NEGATIVE for nausea, abdominal pain, heartburn, or change in bowel habits  :  Negative   MUSCULOSKELETAL:  See HPI above  NEURO:  NEGATIVE for weakness, dizziness or paresthesias  ENDOCRINE:  NEGATIVE for temperature intolerance, skin/hair changes  HEME/ALLERGY/IMMUNE:  NEGATIVE for bleeding problems  PSYCHIATRIC:  NEGATIVE for changes in mood or affect      PHYSICAL EXAM:  Ht 5' 7\" (1.702 m)  Wt 198 lb (89.8 kg)  LMP 09/21/2004  BMI 31.01 kg/m2  Body mass index is 31.01 kg/(m^2).   GENERAL APPEARANCE: healthy, alert and no distress   HEENT: No apparent thyroid megaly. Clear sclera with normal ocular movement  RESPIRATORY: No labored breathing  SKIN: no suspicious lesions or rashes  NEURO: Normal strength and tone, mentation intact and speech normal  VASCULAR: Good pulses, and capillary refill   LYMPH: no lymphadenopathy   PSYCH:  mentation appears normal and affect normal/bright    MUSCULOSKELETAL:  ***     ASSESSMENT:  ***    PLAN:  ***      Imaging Interpretation:   ***      Hiro Al MD  Department of Orthopedic Surgery        Disclaimer: This note consists of symbols derived from keyboarding, dictation and/or voice recognition software. As a result, there may be errors in the script that have gone undetected. Please consider this when interpreting information found in this chart.      Again, thank you for allowing me to participate in the care of your patient.        Sincerely,        Hiro Al MD    "

## 2017-07-06 NOTE — PROGRESS NOTES
HISTORY OF PRESENT ILLNESS:    Stefany Vega is a 61 year old female who is seen in consultation at the request of Dr. Mcguire for left shoulder injury that occurred on 7/1/2017. She was lifting and felt a sudden pop. Patient is left hand dominant. Pain started to subside somewhat. She is left-hand dominant.  Pain is mostly in the superior aspect radiating down to the biceps region.  She does not have radiculopathy symptoms extending below the elbow.  Present symptoms: lots of bruising in left upper arm, left shoulder pain, tingling in the left hand  Treatments tried to this point: sling, Ice, Tylenol #3, Tylenol, had a cortisone injection on 5/24/17 by FRANCK Jean at  [place]  Orthopedic PMH: Chronic shoulder pain. History of right wrist fracture, 2016    Past Medical History:   Diagnosis Date     Abnormal glucose     175 nonfasting - hgb a1c 2/3/06 = 6.3 prior to gastric bypass     Breast cancer (H) 1999    Dr Recio -estrogen receptor positive tumor      Depression     was on citalopram - worked well - now off meds      Edema     left ankle      Essential hypertension 1999    has been on diovan- no longer covered by insurance, lisinopril = dry cough, cozaar =terrible headaches        Gastro-oesophageal reflux disease      Ischemic colitis (H) 11/2011     Lupus anticoagulant disorder (H)     on coumadin- seeing Dr. Ashley-SAMANTAPA     Migraine     were menstrual , now a bit more cluster-like      Obesity, morbid (H)      JESICA (obstructive sleep apnea)     has not used CPAP since gastric bypass     Seasonal allergies     seasonal       Past Surgical History:   Procedure Laterality Date     Breast reconstruction Left 2/00     COLONOSCOPY  2010    fair prep - needs repeat in 2013 per gastro     ESOPHAGOSCOPY, GASTROSCOPY, DUODENOSCOPY (EGD), COMBINED  11/1/2013    Procedure: COMBINED ESOPHAGOSCOPY, GASTROSCOPY, DUODENOSCOPY (EGD), BIOPSY SINGLE OR MULTIPLE;  ESOPHAGOSCOPY, GASTROSCOPY, DUODENOSCOPY  (EGD) with bx;  Surgeon: Jas Lai DO;  Location:  GI     ESOPHAGOSCOPY, GASTROSCOPY, DUODENOSCOPY (EGD), COMBINED N/A 5/20/2016    Procedure: COMBINED ESOPHAGOSCOPY, GASTROSCOPY, DUODENOSCOPY (EGD);  Surgeon: Junior Romano MD;  Location:  GI     EXCISE LESION EYELID Right 12/24/2014    Procedure: EXCISE LESION EYELID;  Surgeon: Albin Acevedo MD;  Location: [facility]     GASTRIC BYPASS  12/23/2009    Dr. Richards - [facility] - s/p laparoscopic Richard-en-y 2004     HYSTERECTOMY, YIFAN  7/2005    s/p YIFAN/BSO/fibroid/endometriosis-precancer from tamoxifen      incisional hernia with SB infarct and resection  9/05     Left mastectomy  2/00     LUMPECTOMY BREAST Right 10/2009     -  lobular carcinoma in-situ     LUMPECTOMY BREAST x2 Left 2/00     RECONSTRUCT EYELID Right 12/24/2014    Procedure: RECONSTRUCT EYELID;  Surgeon: Albin Acevedo MD;  Location: [facility]     right achilles torn, tendon graft  2004     TONSILLECTOMY, ADENOIDECTOMY, COMBINED  1963     WEDGE RESECTION EYELID Right 9/12/2014    Procedure: WEDGE RESECTION EYELID;  Surgeon: Albin Acevedo MD;  Location:  [facility]       Family History   Problem Relation Age of Onset     C.A.D. No family hx of      Cancer - colorectal No family hx of      Hypertension No family hx of      Cardiovascular Father      fluid around heart - takes HCTZ - pt doesn't think dx is CHF      CANCER Mother      CANCER Father      Cardiovascular Mother      DIABETES Mother      juanjose morales     Hypertension Mother      Hypertension Father      Hypertension Paternal Grandmother      C.A.D. Mother      juanjose morales     C.A.D. Paternal Grandmother        Social History     Social History     Marital status: Single     Spouse name: N/A     Number of children: 0     Years of education: N/A     Occupational History           Social History Main Topics     Smoking status: Former Smoker     Packs/day: 0.30     Years: 40.00     Types: Cigarettes     Quit date:  6/5/2012     Smokeless tobacco: Never Used      Comment: started again 8/05 - strongly encourage cessation with every visit - needs to quit for weight loss surgery - noted 7/09      Alcohol use 0.0 oz/week     0 Standard drinks or equivalent per week      Comment: occ     Drug use: No     Sexual activity: Yes     Partners: Male      Comment: post menopausal      Other Topics Concern     Caffeine Concern Yes     3 cups/day     Sleep Concern Yes     nocturia 3 x's     Special Diet No     Exercise No     always on the go     Seat Belt Yes     always      Self-Exams Yes     SBE encouraged monthly      Parent/Sibling W/ Cabg, Mi Or Angioplasty Before 65f 55m? No     Social History Narrative    calcium - 1 600mg plus 200-400 iu three times daily with individual vitamin D 1000 iu in am     colonoscopy -ordered today July 20, 2010     sun precautions - discussed     mammogram  - twice  yearly secondary to hx of breast cancer     Td booster - 11/28/2008    pneumovax -2005, will give booster July 20, 2010 secondary to asthma     DEXA -9/25/2009    stool hemoccults - every year after age 40    ASA- contraindicated secondary to gastric bypass     mulvitamin - encouraged        Current Outpatient Prescriptions   Medication Sig Dispense Refill     Naproxen Sodium (ALEVE PO) Take by mouth as needed for moderate pain       acetaminophen-codeine (TYLENOL/CODEINE #3) 300-30 MG per tablet Take 1-2 tablets by mouth every 4 hours as needed for mild pain 28 tablet 0     buPROPion (ZYBAN) 150 MG 12 hr tablet   5     metoprolol (LOPRESSOR) 25 MG tablet TAKE HALF TABLET BY MOUTH TWICE DAILY 90 tablet 0     hydrochlorothiazide (HYDRODIURIL) 25 MG tablet TAKE 1 TABLET BY MOUTH EVERY DAY IN THE MORNING 90 tablet 0     amLODIPine (NORVASC) 5 MG tablet TAKE ONE TABLET BY MOUTH ONE TIME DAILY 90 tablet 0     omeprazole (PRILOSEC) 40 MG capsule TAKE ONE CAPSULE BY MOUTH TWICE DAILY 180 capsule 3     acetaminophen-codeine (TYLENOL #3) 300-30 MG per  tablet Take 1-2 tablets by mouth every 6 hours as needed for moderate pain 30 tablet 0     HYDROcodone-acetaminophen (NORCO) 5-325 MG per tablet Take 1 tablet by mouth every 6 hours as needed for moderate to severe pain 20 tablet 0     sucralfate (CARAFATE) 1 GM tablet Take 1 tablet (1 g) by mouth 3 times daily 270 tablet 3     mometasone (ELOCON) 0.1 % cream APPLY TOPICALLY TO HANDS FOR UP TO 14 DAYS AT A TIME  45 g 1     glucosamine-chondroitin 500-400 MG CAPS Take 1 capsule by mouth At Bedtime       potassium 99 MG TABS Take 1 tablet by mouth daily       cyanocolbalamin (VITAMIN  B-12) 500 MCG tablet Take 500 mcg by mouth daily       Vitamin D, Cholecalciferol, 1000 UNITS TABS Take 1 tablet by mouth daily       cetirizine (ZYRTEC) 10 MG tablet Take 10 mg by mouth daily        ferrous sulfate (SLO-FE) 142 (45 FE) MG TBCR Take 142 mg by mouth every evening  30 tablet      acyclovir (ZOVIRAX) 800 MG tablet Take one tablet by mouth three times daily  for 2 days (Patient taking differently: as needed Take one tablet by mouth three times daily  for 2 days) 18 tablet 12     calcium-vitamin D (CALCIUM 600 + D) 600-400 MG-UNIT per tablet Take 1 tablet by mouth 2 times daily 60 tablet      ANORO ELLIPTA 62.5-25 MCG/INH oral inhaler   11     fluticasone (FLONASE) 50 MCG/ACT nasal spray USE ONE OR TWO SPRAYS IN EACH NOSTRIL DAILY (Patient not taking: Reported on 7/6/2017) 48 mL 2       Allergies   Allergen Reactions     Amoxicillin Anaphylaxis     Penicillins Anaphylaxis and Swelling     Passed out with tongue swelling in 1980's , but has had augmentin in 2013 without any problems.      Chantix [Varenicline]      Bad nightmares      Estrogens      Can't be on secondary to HX of estrogen receptor positive breast cancer      Ibuprofen Rash     Lisinopril Cough       REVIEW OF SYSTEMS:  CONSTITUTIONAL:  NEGATIVE for fever, chills, change in weight  INTEGUMENTARY/SKIN:  NEGATIVE for worrisome rashes, moles or lesions  EYES:   "NEGATIVE for vision changes or irritation  ENT/MOUTH:  NEGATIVE for ear, mouth and throat problems  RESP:  NEGATIVE for significant cough or SOB  BREAST:  NEGATIVE for masses, tenderness or discharge  CV:  NEGATIVE for chest pain, palpitations or peripheral edema  GI:  NEGATIVE for nausea, abdominal pain, heartburn, or change in bowel habits  :  Negative   MUSCULOSKELETAL:  See HPI above  NEURO:  NEGATIVE for weakness, dizziness or paresthesias  ENDOCRINE:  NEGATIVE for temperature intolerance, skin/hair changes  HEME/ALLERGY/IMMUNE:  NEGATIVE for bleeding problems  PSYCHIATRIC:  NEGATIVE for changes in mood or affect      PHYSICAL EXAM:  Ht 5' 7\" (1.702 m)  Wt 198 lb (89.8 kg)  LMP 09/21/2004  BMI 31.01 kg/m2  Body mass index is 31.01 kg/(m^2).   GENERAL APPEARANCE: healthy, alert and no distress   HEENT: No apparent thyroid megaly. Clear sclera with normal ocular movement  RESPIRATORY: No labored breathing  SKIN: no suspicious lesions or rashes  NEURO: Normal strength and tone, mentation intact and speech normal  VASCULAR: Good pulses, and capillary refill   LYMPH: no lymphadenopathy   PSYCH:  mentation appears normal and affect normal/bright    MUSCULOSKELETAL:  Diffuse ecchymosis in the biceps region, left compared to the right  Mild swelling of the left upper arm  Intact passive range of motion  She lacks about 10 of terminal forward elevation actively, left  Negative arm drop test, left  However, external rotation and abduction strength is slightly weaker  On the dominant side for her  Elbow range of motion is full  Minimal hand degenerative changes  Some tenderness at the biceps region    Right shoulder examination findings are unremarkable.     ASSESSMENT:  Long head biceps tendon rupture  Chronic impingement syndrome  Chronic rotator cuff tear, left shoulder    PLAN:  We visualized the x-rays of the left shoulder taken today. She does have type III acromion and advanced AC joint DJD with associated " hypertrophy. The situation is very consistent with chronic impingement phenomenon.  We recommended MRI scan evaluation to assess the rotator cuff status.  We are able to get her into the MRI imaging center today. We'll see her back to discuss results and to discuss future treatment options.    Imaging Interpretation:     Three views of left shoulder demonstrate presence of advanced acromioclavicular joint DJD and hypertrophy. Type III acromion is present. The situations consistent with chronic impingement of the shoulder. No significant proximal migration of the humeral head is noted. Minimal glenohumeral joint DJD changes are noted. No acute fractures are noted.      Hiro Al MD  Department of Orthopedic Surgery        Disclaimer: This note consists of symbols derived from keyboarding, dictation and/or voice recognition software. As a result, there may be errors in the script that have gone undetected. Please consider this when interpreting information found in this chart.

## 2017-07-06 NOTE — Clinical Note
Thank you very much for the opportunity of evaluating  your patient. If you have any questions about the visit,  please do not hesitate to call me at [phone] or page me at [phone]. Asa

## 2017-07-10 NOTE — TELEPHONE ENCOUNTER
Pre PT orders pending for 8/02/2017 for Left shoulder arthroscopy, decompression, distal clavicle resection, rotator cuff repair.  Please sign

## 2017-07-10 NOTE — TELEPHONE ENCOUNTER
Scheduled surgery for  Left shoulder arthroscopy, decompression, distal clavicle resection, rotator cuff repair  on 8/02/2017 with Dr. Al @ [facility] @ 4PM.  Surgery education packet provided to patient.

## 2017-07-10 NOTE — NURSING NOTE
"Chief Complaint   Patient presents with     Results     MRI results, Left Shoulder       Initial /70 (BP Location: Right arm, Patient Position: Sitting, Cuff Size: Adult Regular)  Ht 5' 7\" (1.702 m)  Wt 198 lb (89.8 kg)  LMP 09/21/2004  BMI 31.01 kg/m2 Estimated body mass index is 31.01 kg/(m^2) as calculated from the following:    Height as of this encounter: 5' 7\" (1.702 m).    Weight as of this encounter: 198 lb (89.8 kg).  Medication Reconciliation: complete    "

## 2017-07-10 NOTE — MR AVS SNAPSHOT
"              After Visit Summary   7/10/2017    Stefany Vega    MRN: [id number]           Patient Information     Date Of Birth          1955        Visit Information        Provider Department      7/10/2017 8:50 AM Hiro Al MD [facility]        Today's Diagnoses     Complete tear of left rotator cuff    -  1    Rupture long head biceps tendon, left, subsequent encounter           Follow-ups after your visit        Your next 10 appointments already scheduled     Jul 24, 2017  7:20 AM CDT   OPAL Extremity with Himanshu Bill PT   [facility] ([facility])    [address]  [facility] [address] [phone]              Who to contact     If you have questions or need follow up information about today's clinic visit or your schedule please contact [facility] directly at [phone].  Normal or non-critical lab and imaging results will be communicated to you by MyChart, letter or phone within 4 business days after the clinic has received the results. If you do not hear from us within 7 days, please contact the clinic through MyChart or phone. If you have a critical or abnormal lab result, we will notify you by phone as soon as possible.  Submit refill requests through [facility] or call your pharmacy and they will forward the refill request to us. Please allow 3 business days for your refill to be completed.          Additional Information About Your Visit        MyChart Information     [facility] lets you send messages to your doctor, view your test results, renew your prescriptions, schedule appointments and more. To sign up, go to [email or web address] . Click on \"Log in\" on the left side of the screen, which will take you to the Welcome page. Then click on \"Sign up Now\" on the right side of the page.     You will be asked to enter the access code listed below, as well as some personal information. Please follow the " "directions to create your username and password.     Your access code is: [email or web address]  Expires: 2017  4:31 PM     Your access code will  in 90 days. If you need help or a new code, please call your [place] clinic or [phone].        Care EveryWhere ID     This is your Care EveryWhere ID. This could be used by other organizations to access your [place] medical records  [id number]        Your Vitals Were     Height Last Period BMI (Body Mass Index)             5' 7\" (1.702 m) 2004 31.01 kg/m2          Blood Pressure from Last 3 Encounters:   07/10/17 126/70   17 138/82   17 118/84    Weight from Last 3 Encounters:   07/10/17 198 lb (89.8 kg)   17 198 lb (89.8 kg)   17 198 lb (89.8 kg)              Today, you had the following     No orders found for display       Primary Care Provider Office Phone # Fax #    Lisa Mcguire [phone]       [facility] [address] [facility] [address]        Equal Access to Services     ANNA MARIE PEREZ : Hadii august leiva hadasho Soalbaali, waaxda luqadaha, qaybta kaalmada adeegyada, debbie arriaza . So [facility] [phone].    ATENCIÓN: Si habla español, tiene a kenyn disposición servicios gratuitos de asistencia lingüística. Llame al [phone].    We comply with applicable federal civil rights laws and [place] laws. We do not discriminate on the basis of race, color, national origin, age, disability sex, sexual orientation or gender identity.            Thank you!     Thank you for choosing [facility]  for your care. Our goal is always to provide you with excellent care. Hearing back from our patients is one way we can continue to improve our services. Please take a few minutes to complete the written survey that you may receive in the mail after your visit with us. Thank you!             Your Updated Medication List - Protect others around you: " Learn how to safely use, store and throw away your medicines at www.disposemymeds.org.          This list is accurate as of: 7/10/17  9:24 AM.  Always use your most recent med list.                   Brand Name Dispense Instructions for use Diagnosis    * acetaminophen-codeine 300-30 MG per tablet    TYLENOL #3    30 tablet    Take 1-2 tablets by mouth every 6 hours as needed for moderate pain    Other closed intra-articular fracture of distal end of right radius, initial encounter       * acetaminophen-codeine 300-30 MG per tablet    TYLENOL/codeine #3    28 tablet    Take 1-2 tablets by mouth every 4 hours as needed for mild pain    Strain of biceps tendon, left, initial encounter, Hematoma of arm, left, initial encounter       acyclovir 800 MG tablet    ZOVIRAX    18 tablet    Take one tablet by mouth three times daily  for 2 days    HSV (herpes simplex virus) infection       ALEVE PO      Take by mouth as needed for moderate pain        amLODIPine 5 MG tablet    NORVASC    90 tablet    TAKE ONE TABLET BY MOUTH ONE TIME DAILY    Essential hypertension with goal blood pressure less than 140/90       ANORO ELLIPTA 62.5-25 MCG/INH oral inhaler   Generic drug:  umeclidinium-vilanterol           buPROPion 150 MG 12 hr tablet    ZYBAN          calcium 600 + D 600-400 MG-UNIT per tablet   Generic drug:  calcium-vitamin D     60 tablet    Take 1 tablet by mouth 2 times daily        cetirizine 10 MG tablet    zyrTEC     Take 10 mg by mouth daily        cyanocolbalamin 500 MCG tablet    vitamin  B-12     Take 500 mcg by mouth daily        ferrous sulfate 142 (45 FE) MG Tbcr    SLO-FE    30 tablet    Take 142 mg by mouth every evening        fluticasone 50 MCG/ACT spray    FLONASE    48 mL    USE ONE OR TWO SPRAYS IN EACH NOSTRIL DAILY    Allergic state       glucosamine-chondroitin 500-400 MG Caps per capsule      Take 1 capsule by mouth At Bedtime        hydrochlorothiazide 25 MG tablet    HYDRODIURIL    90 tablet    TAKE  1 TABLET BY MOUTH EVERY DAY IN THE MORNING    Essential hypertension with goal blood pressure less than 140/90       HYDROcodone-acetaminophen 5-325 MG per tablet    NORCO    20 tablet    Take 1 tablet by mouth every 6 hours as needed for moderate to severe pain    Closed buckle fracture of wrist, right, initial encounter       metoprolol 25 MG tablet    LOPRESSOR    90 tablet    TAKE HALF TABLET BY MOUTH TWICE DAILY    Essential hypertension with goal blood pressure less than 140/90       mometasone 0.1 % cream    ELOCON    45 g    APPLY TOPICALLY TO HANDS FOR UP TO 14 DAYS AT A TIME    Dermatitis       omeprazole 40 MG capsule    priLOSEC    180 capsule    TAKE ONE CAPSULE BY MOUTH TWICE DAILY    GERD (gastroesophageal reflux disease)       potassium 99 MG Tabs      Take 1 tablet by mouth daily        sucralfate 1 GM tablet    CARAFATE    270 tablet    Take 1 tablet (1 g) by mouth 3 times daily    GERD (gastroesophageal reflux disease), Dyspepsia, Morbid obesity (H)       Vitamin D (Cholecalciferol) 1000 UNITS Tabs      Take 1 tablet by mouth daily        * Notice:  This list has 2 medication(s) that are the same as other medications prescribed for you. Read the directions carefully, and ask your doctor or other care provider to review them with you.

## 2017-07-10 NOTE — PROGRESS NOTES
"HISTORY OF PRESENT ILLNESS:    Stefany Vega is a 61 year old female who is seen in follow up for Left shoulder, MRI results.  Present symptoms: Pt still has visible bruising, states it continues to hurt, has been using ice.  Symptoms are unchanged.  Treatments tried to this point: MRI, sling, Ice, Tylenol #3, Tylenol, had a cortisone injection on 5/24/17  Past Medical History: Unchanged from the visit of 7/6/2017. Please refer to that note.    REVIEW OF SYSTEMS:  CONSTITUTIONAL:  NEGATIVE for fever, chills, change in weight  INTEGUMENTARY/SKIN:  NEGATIVE for worrisome rashes, moles or lesions  EYES:  NEGATIVE for vision changes or irritation  ENT/MOUTH:  NEGATIVE for ear, mouth and throat problems  RESP:  NEGATIVE for significant cough or SOB  BREAST:  NEGATIVE for masses, tenderness or discharge  CV:  NEGATIVE for chest pain, palpitations or peripheral edema  GI:  NEGATIVE for nausea, abdominal pain, heartburn, or change in bowel habits  :  Negative   MUSCULOSKELETAL:  See HPI above  NEURO:  NEGATIVE for weakness, dizziness or paresthesias  ENDOCRINE:  NEGATIVE for temperature intolerance, skin/hair changes  HEME/ALLERGY/IMMUNE:  NEGATIVE for bleeding problems  PSYCHIATRIC:  NEGATIVE for changes in mood or affect    PHYSICAL EXAM:  /70 (BP Location: Right arm, Patient Position: Sitting, Cuff Size: Adult Regular)  Ht 5' 7\" (1.702 m)  Wt 198 lb (89.8 kg)  LMP 09/21/2004  BMI 31.01 kg/m2  Body mass index is 31.01 kg/(m^2).   GENERAL APPEARANCE: healthy, alert and no distress   SKIN: no suspicious lesions or rashes  NEURO: Normal strength and tone, mentation intact and speech normal  VASCULAR:  good pulses, and cappillary refill   LYMPH: no lymphadenopathy   PSYCH:  mentation appears normal and affect normal/bright    MSK:  Minimal fading of ecchymosis In the left arm since her last visit, July 6, 2017  Elbow range of motion is well maintained  Continuing limited shoulder movement " actively  Significant weakness of external rotation and abduction, left    IMAGING INTERPRETATION:   MR Shoulder Left w/o Contrast    Narrative    MR LEFT SHOULDER  7/6/2017 10:03 AM    HISTORY: Left shoulder pain. Evaluate rotator cuff. No specific  injury.    TECHNIQUE: Oblique coronal T1, T2 and T2 fat suppressed images,  oblique sagittal T2 and axial proton density and T2 weighted images  were obtained.     COMPARISON: None.    FINDINGS:  Osseous acromial outlet: Moderate-to-marked hypertrophic degenerative  change at the acromioclavicular joint abuts but does not indent the  supraspinatus myotendinous junction. Distal acromial morphology is  type 1 or 2 with neutral slope on oblique sagittal images and no  lateral downsloping on oblique coronal images. There is a small amount  of abnormal fluid in the subacromial/subdeltoid bursa.    Rotator cuff: Prominent signal heterogeneity throughout the  supraspinatus tendon consistent with degenerative tendinopathy. There  is a focus of full-thickness or near full-thickness tear in the  far-anterolateral supraspinatus tendon measuring up to 1.5 cm  mediolateral dimension and 0.6 cm anteroposterior dimension (image 11  of series 7, image 9 of series 8 and image 13 of series 4). No overall  retraction of the supraspinatus tendon and the supraspinatus muscle  shows no significant fatty atrophy. The infraspinatus tendon shows  mild degenerative tendinopathy in the infraspinatus and teres minor  muscles are normal. The subscapularis tendon is mildly thinned and  heterogeneous consistent with degenerative tendinopathy. No definite  tear and the subscapularis muscle shows no atrophy.    Labrum: The superior labrum is small and ill-defined, likely from  diffuse degeneration. The posterior labrum is very small and the  anteroinferior labrum appears normal to the extent visualized without  intra-articular fluid or contrast.    Biceps tendon: The biceps tendon is not identified  proximally at the  biceps anchor or distally in the bicipital groove consistent with a  complete distally retracted tear.      Osseous structures and cartilaginous surfaces: No acute-appearing bony  abnormalities. Cystic resorptive changes are seen in the far-anterior  humeral head. Glenohumeral articular cartilages appear relatively  normal.    Additional findings: None.      Impression    IMPRESSION:   1. 1.5 x 0.6 cm full-thickness or near full-thickness tear  anterolateral supraspinatus tendon, superimposed on prominent  degenerative tendinopathy.  2. Degenerative tendinopathy in the infraspinatus and subscapularis  tendons without tear.  3. Diffuse degeneration superior labrum.  4. Complete tear of the biceps tendon at the biceps anchor with distal  retraction.  5. Hypertrophic degenerative change at the acromioclavicular joint.    DORI SEXTON MD       ASSESSMENT:  Chronic impingement syndrome, left shoulder  Rotator cuff tear  Long head biceps tendon rupture    PLAN:  MRI scan images from July 6, 2017 were visualized. Findings were thoroughly explained.  As was suspected on her last visit, she does have chronic rotator cuff tear. MRI scan was helpful in determining the size of the tear. It appears that current rotator cuff tear is manageable with surgery of repair rather than replacement.  With significant bone spurs of the acromion and hypertrophy of the acromioclavicular joint,, she is at risk for continuing impingement.  At this point, options of observation versus surgery were thoroughly discussed.  Details but the surgery including general healing time, potential risks and recovery process  which will include  approximately one month of arm sling.  We talked about taking one month off from work after the surgery.. All the questions were answered.           Hiro Al MD  Dept. Orthopedic Surgery  [facility]       Disclaimer: This note consists of symbols derived from keyboarding, dictation  and/or voice recognition software. As a result, there may be errors in the script that have gone undetected. Please consider this when interpreting information found in this chart.

## 2017-07-10 NOTE — LETTER
"      7/10/2017         RE: Stefany Vega  [address] [address]        Dear Colleague,    Thank you for referring your patient, Stefany Vega, to the [facility]. Please see a copy of my visit note below.    HISTORY OF PRESENT ILLNESS:    Stefany Vega is a 61 year old female who is seen in follow up for Left shoulder, MRI results.  Present symptoms: Pt still has visible bruising, states it continues to hurt, has been using ice.  Symptoms are unchanged.  Treatments tried to this point: MRI, sling, Ice, Tylenol #3, Tylenol, had a cortisone injection on 5/24/17  Past Medical History: Unchanged from the visit of 7/6/2017. Please refer to that note.    REVIEW OF SYSTEMS:  CONSTITUTIONAL:  NEGATIVE for fever, chills, change in weight  INTEGUMENTARY/SKIN:  NEGATIVE for worrisome rashes, moles or lesions  EYES:  NEGATIVE for vision changes or irritation  ENT/MOUTH:  NEGATIVE for ear, mouth and throat problems  RESP:  NEGATIVE for significant cough or SOB  BREAST:  NEGATIVE for masses, tenderness or discharge  CV:  NEGATIVE for chest pain, palpitations or peripheral edema  GI:  NEGATIVE for nausea, abdominal pain, heartburn, or change in bowel habits  :  Negative   MUSCULOSKELETAL:  See HPI above  NEURO:  NEGATIVE for weakness, dizziness or paresthesias  ENDOCRINE:  NEGATIVE for temperature intolerance, skin/hair changes  HEME/ALLERGY/IMMUNE:  NEGATIVE for bleeding problems  PSYCHIATRIC:  NEGATIVE for changes in mood or affect    PHYSICAL EXAM:  /70 (BP Location: Right arm, Patient Position: Sitting, Cuff Size: Adult Regular)  Ht 5' 7\" (1.702 m)  Wt 198 lb (89.8 kg)  LMP 09/21/2004  BMI 31.01 kg/m2  Body mass index is 31.01 kg/(m^2).   GENERAL APPEARANCE: healthy, alert and no distress   SKIN: no suspicious lesions or rashes  NEURO: Normal strength and tone, mentation intact and speech normal  VASCULAR:  good pulses, and cappillary refill   LYMPH: no " lymphadenopathy   PSYCH:  mentation appears normal and affect normal/bright    MSK:  Minimal fading of ecchymosis In the left arm since her last visit, July 6, 2017  Elbow range of motion is well maintained  Continuing limited shoulder movement actively  Significant weakness of external rotation and abduction, left    IMAGING INTERPRETATION:   MR Shoulder Left w/o Contrast    Narrative    MR LEFT SHOULDER  7/6/2017 10:03 AM    HISTORY: Left shoulder pain. Evaluate rotator cuff. No specific  injury.    TECHNIQUE: Oblique coronal T1, T2 and T2 fat suppressed images,  oblique sagittal T2 and axial proton density and T2 weighted images  were obtained.     COMPARISON: None.    FINDINGS:  Osseous acromial outlet: Moderate-to-marked hypertrophic degenerative  change at the acromioclavicular joint abuts but does not indent the  supraspinatus myotendinous junction. Distal acromial morphology is  type 1 or 2 with neutral slope on oblique sagittal images and no  lateral downsloping on oblique coronal images. There is a small amount  of abnormal fluid in the subacromial/subdeltoid bursa.    Rotator cuff: Prominent signal heterogeneity throughout the  supraspinatus tendon consistent with degenerative tendinopathy. There  is a focus of full-thickness or near full-thickness tear in the  far-anterolateral supraspinatus tendon measuring up to 1.5 cm  mediolateral dimension and 0.6 cm anteroposterior dimension (image 11  of series 7, image 9 of series 8 and image 13 of series 4). No overall  retraction of the supraspinatus tendon and the supraspinatus muscle  shows no significant fatty atrophy. The infraspinatus tendon shows  mild degenerative tendinopathy in the infraspinatus and teres minor  muscles are normal. The subscapularis tendon is mildly thinned and  heterogeneous consistent with degenerative tendinopathy. No definite  tear and the subscapularis muscle shows no atrophy.    Labrum: The superior labrum is small and  ill-defined, likely from  diffuse degeneration. The posterior labrum is very small and the  anteroinferior labrum appears normal to the extent visualized without  intra-articular fluid or contrast.    Biceps tendon: The biceps tendon is not identified proximally at the  biceps anchor or distally in the bicipital groove consistent with a  complete distally retracted tear.      Osseous structures and cartilaginous surfaces: No acute-appearing bony  abnormalities. Cystic resorptive changes are seen in the far-anterior  humeral head. Glenohumeral articular cartilages appear relatively  normal.    Additional findings: None.      Impression    IMPRESSION:   1. 1.5 x 0.6 cm full-thickness or near full-thickness tear  anterolateral supraspinatus tendon, superimposed on prominent  degenerative tendinopathy.  2. Degenerative tendinopathy in the infraspinatus and subscapularis  tendons without tear.  3. Diffuse degeneration superior labrum.  4. Complete tear of the biceps tendon at the biceps anchor with distal  retraction.  5. Hypertrophic degenerative change at the acromioclavicular joint.    DORI SEXTON MD       ASSESSMENT:  Chronic impingement syndrome, left shoulder  Rotator cuff tear  Long head biceps tendon rupture    PLAN:  MRI scan images from July 6, 2017 were visualized. Findings were thoroughly explained.  As was suspected on her last visit, she does have chronic rotator cuff tear. MRI scan was helpful in determining the size of the tear. It appears that current rotator cuff tear is manageable with surgery of repair rather than replacement.  With significant bone spurs of the acromion and hypertrophy of the acromioclavicular joint,, she is at risk for continuing impingement.  At this point, options of observation versus surgery were thoroughly discussed.  Details but the surgery including general healing time, potential risks and recovery process  which will include  approximately one month of arm sling.  We  talked about taking one month off from work after the surgery.. All the questions were answered.           Hiro Al MD  Dept. Orthopedic Surgery  [facility]       Disclaimer: This note consists of symbols derived from keyboarding, dictation and/or voice recognition software. As a result, there may be errors in the script that have gone undetected. Please consider this when interpreting information found in this chart.      Again, thank you for allowing me to participate in the care of your patient.        Sincerely,        Hiro Al MD

## 2017-07-10 NOTE — LETTER
[facility]  [address]  [address]  [place] [address]  [phone]        7/10/2017    Stefanyvitaliy Vega  [address] [id number] [phone] (home)     :     1955      To Whom it May Concern:    This patient is scheduled for Left shoulder surgery on 2017.   Please anticipate patient being off work for 1 month following surgery for recovery.    Please contact me for questions or concerns.    Sincerely,        Hiro Al MD

## 2017-07-14 NOTE — PROGRESS NOTES
[address] [address]  [phone]  Dept: [phone]    PRE-OP EVALUATION:  Today's date: 2017    Stefany Vega (: 1955) presents for pre-operative evaluation assessment as requested by Dr. Al.  She requires evaluation and anesthesia risk assessment prior to undergoing surgery/procedure for treatment of Shoulder pain .  Proposed procedure:    ARTHROSCOPY SHOULDER DECOMPRESSION/ ARTHROSCOPY SHOULDER DECOMPRESSION, DISTAL CLAVICLE RESECTION,ROTATOR CUFF REPAIR. Left    Date of Surgery/ Procedure: 2017  Time of Surgery/ Procedure: 3:40 PM  Hospital/Surgical Facility: [facility]  Primary Physician: Lisa Mcguire  Type of Anesthesia Anticipated: Interscaline block    Patient has a Health Care Directive or Living Will:  NO    1. NO - Do you have a history of heart attack, stroke, stent, bypass or surgery on an artery in the head, neck, heart or legs?  2. NO - Do you ever have any pain or discomfort in your chest?  3. NO - Do you have a history of  Heart Failure?  4. NO - Are you troubled by shortness of breath when: walking on the level, up a slight hill or at night?  5. NO - Do you currently have a cold, bronchitis or other respiratory infection?  6. YES - DO YOU HAVE A COUGH, SHORTNESS OF BREATH OR WHEEZING? Cough  7. NO - Do you sometimes get pains in the calves of your legs when you walk?  8. NO - Do you or anyone in your family have previous history of blood clots?  9. NO - Do you or does anyone in your family have a serious bleeding problem such as prolonged bleeding following surgeries or cuts?  10. YES - HAVE YOU EVER HAD PROBLEMS WITH ANEMIA OR BEEN TOLD TO TAKE IRON PILLS? Pt takes iron  11. NO - Have you had any abnormal blood loss such as black, tarry or bloody stools, or abnormal vaginal bleeding?  12. YES - HAVE YOU EVER HAD A BLOOD TRANSFUSION?   13. NO - Have you or any of your relatives ever had  problems with anesthesia?  14. NO - Do you have sleep apnea, excessive snoring or daytime drowsiness?  15. NO - Do you have any prosthetic heart valves?  16. NO - Do you have prosthetic joints?  17. NO - Is there any chance that you may be pregnant?      HPI:                                                      Brief HPI related to upcoming procedure: ARTHROSCOPY SHOULDER DECOMPRESSION/ ARTHROSCOPY SHOULDER DECOMPRESSION, DISTAL CLAVICLE RESECTION,ROTATOR CUFF REPAIR. Left    See problem list for active medical problems.  Problems all longstanding and stable, except as noted/documented.  See ROS for pertinent symptoms related to these conditions.                                                                                                  .  HYPERTENSION - Patient has longstanding history of HTN , currently denies any symptoms referable to elevated blood pressure. Specifically denies chest pain, palpitations, dyspnea, orthopnea, PND or peripheral edema. Blood pressure readings have been in normal range. Current medication regimen is as listed below. Patient denies any side effects of medication.                                                                                                                                                                                          .  DEPRESSION - Patient has a long history of Depression of moderate severity requiring medication for control with recent symptoms being stable..Current symptoms of depression include none.                                                           .  SLEEP PROBLEM - Patient has a longstanding history of sleep apnea of moderate severity.                                                                                                                                       .  MEDICAL HISTORY:                                                      Patient Active Problem List    Diagnosis Date Noted     Mild intermittent asthma without complication  11/17/2005     Priority: High     allergic - colognes       Other type of migraine 08/28/2003     Priority: High     Diagnosis updated by automated process. Provider to review and confirm.       Essential hypertension with goal blood pressure less than 140/90 06/16/2003     Priority: High     Thrombocytosis (H) 07/17/2017     Priority: Medium     Acute pain of left shoulder 06/05/2017     Priority: Medium     Impingement syndrome of left shoulder 06/05/2017     Priority: Medium     Simple chronic bronchitis (H) 02/02/2017     Priority: Medium     S/P gastric bypass 05/18/2016     Priority: Medium     Intestinal malabsorption- secondary to gastric bypass 10/15/2014     Priority: Medium     updating diagnosis code for icd10 cutover       Blood in stool- has internal hemorrhoids, per [facility], does not need FIT 10/01/2013     Priority: Medium     Positive hemoccult       GERD (gastroesophageal reflux disease) 06/13/2012     Priority: Medium     Lupus anticoagulant (LAC) with hemorrhagic disorder- was on coumadin for 1 year, now off      Priority: Medium     on coumadin       Blood glucose abnormal      Priority: Medium     a`1c = 6.3 in 2009 prior to gastric bypass and wt loss        Advanced directives, counseling/discussion 11/22/2011     Priority: Medium     Advance Directive Problem List Overview:   Name Relationship Phone    Primary Health Care Agent            Alternative Health Care Agent          Discussed advance care planning with patient; however, patient declined at this time. 11/22/2011     Advance Care Planning 5/23/2017: ACP Review of Chart / Resources Provided:  Reviewed chart for advance care plan.  Stefany Vega has denied information and resources to begin or update their advance care plan.  Added by Taina Ly               Ischemic colitis 11/22/2011     Priority: Medium     CARDIOVASCULAR SCREENING; LDL GOAL LESS THAN 100 10/31/2010     Priority: Medium     Lobular Carcinoma in Situ of  Breast-10/2009- - right breast 07/20/2010     Priority: Medium     Lipoma of Skin and Subcutaneous Tissue-left arm  07/20/2010     Priority: Medium     RECURR Depressive disorder -PART REM [296.35]      Priority: Medium     was on citalopram - worked well - now off meds        Non morbid obesity due to excess calories- prev. morbid obesity resolved s/p gastric bypass in 12/2009 06/16/2009     Priority: Medium     Sleep apnea 06/16/2009     Priority: Medium     Exophthalmia 11/28/2008     Priority: Medium     Iron deficiency anemia 05/08/2006     Priority: Medium     Problem list name updated by automated process. Provider to review       Edema 12/14/2005     Priority: Medium     left ankle        Open wound of foot, excluding toe(s) 04/01/2005     Priority: Medium     Problem list name updated by automated process. Provider to review       TOBACCO USE DISORDER- quit again 6/2012- off and on still 2016 04/01/2005     Priority: Medium     Allergic state 06/16/2003     Priority: Medium     Symptomatic menopausal or female climacteric states 06/16/2003     Priority: Medium     Malignant neoplasm of left breast in female, estrogen receptor positive, unspecified site of breast (H) 06/16/2003     Priority: Medium     Problem list name updated by automated process. Provider to review        Past Medical History:   Diagnosis Date     Abnormal glucose     175 nonfasting - hgb a1c 2/3/06 = 6.3 prior to gastric bypass     Breast cancer (H) 1999    Dr Recio -estrogen receptor positive tumor      Depression     was on citalopram - worked well - now off meds      Edema     left ankle      Essential hypertension 1999    has been on diovan- no longer covered by insurance, lisinopril = dry cough, cozaar =terrible headaches        Gastro-oesophageal reflux disease      Ischemic colitis (H) 11/2011     Lupus anticoagulant disorder (H)     on coumadin- seeing Dr. Ashley-SOLIS     Migraine     were menstrual , now a bit more  cluster-like      Obesity, morbid (H)      JESICA (obstructive sleep apnea)     has not used CPAP since gastric bypass     Seasonal allergies     seasonal     Past Surgical History:   Procedure Laterality Date     Breast reconstruction Left 2/00     COLONOSCOPY  2010    fair prep - needs repeat in 2013 per gastro     ESOPHAGOSCOPY, GASTROSCOPY, DUODENOSCOPY (EGD), COMBINED  11/1/2013    Procedure: COMBINED ESOPHAGOSCOPY, GASTROSCOPY, DUODENOSCOPY (EGD), BIOPSY SINGLE OR MULTIPLE;  ESOPHAGOSCOPY, GASTROSCOPY, DUODENOSCOPY (EGD) with bx;  Surgeon: Jas Lai DO;  Location:  GI     ESOPHAGOSCOPY, GASTROSCOPY, DUODENOSCOPY (EGD), COMBINED N/A 5/20/2016    Procedure: COMBINED ESOPHAGOSCOPY, GASTROSCOPY, DUODENOSCOPY (EGD);  Surgeon: Junior Romano MD;  Location:  GI     EXCISE LESION EYELID Right 12/24/2014    Procedure: EXCISE LESION EYELID;  Surgeon: Albin Acevedo MD;  Location: [facility]     GASTRIC BYPASS  12/23/2009    Dr. Richards - [facility] - s/p laparoscopic Richard-en-y 2004     HYSTERECTOMY, YIFAN  7/2005    s/p YIFAN/BSO/fibroid/endometriosis-precancer from tamoxifen      incisional hernia with SB infarct and resection  9/05     Left mastectomy  2/00     LUMPECTOMY BREAST Right 10/2009     -  lobular carcinoma in-situ     LUMPECTOMY BREAST x2 Left 2/00     RECONSTRUCT EYELID Right 12/24/2014    Procedure: RECONSTRUCT EYELID;  Surgeon: Albin Acevedo MD;  Location: [facility]     right achilles torn, tendon graft  2004     TONSILLECTOMY, ADENOIDECTOMY, COMBINED  1963     WEDGE RESECTION EYELID Right 9/12/2014    Procedure: WEDGE RESECTION EYELID;  Surgeon: Albin Acevedo MD;  Location: [facility]     Current Outpatient Prescriptions   Medication Sig Dispense Refill     acetaminophen-codeine (TYLENOL/CODEINE #3) 300-30 MG per tablet Take 1-2 tablets by mouth every 4 hours as needed for mild pain 28 tablet 0     ANORO ELLIPTA 62.5-25 MCG/INH oral inhaler   11     buPROPion (ZYBAN) 150 MG 12 hr tablet   5      metoprolol (LOPRESSOR) 25 MG tablet TAKE HALF TABLET BY MOUTH TWICE DAILY 90 tablet 0     hydrochlorothiazide (HYDRODIURIL) 25 MG tablet TAKE 1 TABLET BY MOUTH EVERY DAY IN THE MORNING 90 tablet 0     amLODIPine (NORVASC) 5 MG tablet TAKE ONE TABLET BY MOUTH ONE TIME DAILY 90 tablet 0     omeprazole (PRILOSEC) 40 MG capsule TAKE ONE CAPSULE BY MOUTH TWICE DAILY 180 capsule 3     fluticasone (FLONASE) 50 MCG/ACT nasal spray USE ONE OR TWO SPRAYS IN EACH NOSTRIL DAILY 48 mL 2     sucralfate (CARAFATE) 1 GM tablet Take 1 tablet (1 g) by mouth 3 times daily 270 tablet 3     mometasone (ELOCON) 0.1 % cream APPLY TOPICALLY TO HANDS FOR UP TO 14 DAYS AT A TIME  45 g 1     glucosamine-chondroitin 500-400 MG CAPS Take 1 capsule by mouth At Bedtime       potassium 99 MG TABS Take 1 tablet by mouth daily       cyanocolbalamin (VITAMIN  B-12) 500 MCG tablet Take 500 mcg by mouth daily       Vitamin D, Cholecalciferol, 1000 UNITS TABS Take 1 tablet by mouth daily       cetirizine (ZYRTEC) 10 MG tablet Take 10 mg by mouth daily        ferrous sulfate (SLO-FE) 142 (45 FE) MG TBCR Take 142 mg by mouth every evening  30 tablet      acyclovir (ZOVIRAX) 800 MG tablet Take one tablet by mouth three times daily  for 2 days 18 tablet 12     calcium-vitamin D (CALCIUM 600 + D) 600-400 MG-UNIT per tablet Take 1 tablet by mouth 2 times daily 60 tablet      OTC products: None, except as noted above    Allergies   Allergen Reactions     Amoxicillin Anaphylaxis     Penicillins Anaphylaxis and Swelling     Passed out with tongue swelling in 1980's , but has had augmentin in 2013 without any problems.      Chantix [Varenicline]      Bad nightmares      Estrogens      Can't be on secondary to HX of estrogen receptor positive breast cancer      Ibuprofen Rash     Lisinopril Cough      Latex Allergy: NO    Social History   Substance Use Topics     Smoking status: Former Smoker     Packs/day: 0.30     Years: 40.00     Types: Cigarettes     Quit  "date: 6/5/2012     Smokeless tobacco: Never Used      Comment: started again 8/05 - strongly encourage cessation with every visit - needs to quit for weight loss surgery - noted 7/09      Alcohol use 0.0 oz/week     0 Standard drinks or equivalent per week      Comment: occ     History   Drug Use No       REVIEW OF SYSTEMS:                                                    Constitutional, neuro, ENT, endocrine, pulmonary, cardiac, gastrointestinal, genitourinary, musculoskeletal, integument and psychiatric systems are negative, except as otherwise noted.     Cough the last week, which is non-productive with sinus pressure causing a mild frontal headache. She has been taking Tylenol 3 to help with this. She also took tessalon pearls with some relief. There have been some coughs going around at her workplace. No chest pain, pleuritic chest pain, rhinorrhea, sob, or fever    This document serves as a record of the services and decisions personally performed and made by Obed Andrade MD. It was created on his behalf by Yvonne Hemphill, a trained medical scribe. The creation of this document is based the provider's statements to the medical scribe.  Yvonne Hemphill   EXAM:                                                    /80 (BP Location: Right arm, Patient Position: Chair, Cuff Size: Adult Large)  Pulse 68  Temp 98.2  F (36.8  C) (Oral)  Ht 5' 7\" (1.702 m)  Wt 193 lb (87.5 kg)  LMP 09/21/2004  SpO2 99%  BMI 30.23 kg/m2    GENERAL APPEARANCE: healthy, alert and no distress     EYES: EOMI, PERRL     HENT: ear canals and TM's normal and nose and mouth without ulcers or lesions     NECK: no adenopathy, no asymmetry, masses, or scars and thyroid normal to palpation     RESP: lungs clear to auscultation - no rales, rhonchi or wheezes     CV: 1/6 systolic murmur, otherwise, regular rates and rhythm, normal S1 S2, no S3 or S2, no click or rub     ABDOMEN:  soft, nontender, no HSM or masses and bowel sounds " normal     MS: mild right cervical tenderness with palpation, otherwise, extremities normal- no gross deformities noted, no evidence of inflammation in joints, FROM in all extremities.     SKIN: no suspicious lesions or rashes     NEURO: Normal strength and tone, sensory exam grossly normal, mentation intact and speech normal     PSYCH: mentation appears normal. and affect normal/bright     LYMPHATICS: No axillary, cervical, or supraclavicular nodes  Results for orders placed or performed in visit on 07/17/17 (from the past 24 hour(s))   CBC with platelets and differential   Result Value Ref Range    WBC 15.5 (H) 4.0 - 11.0 10e9/L    RBC Count 5.51 (H) 3.8 - 5.2 10e12/L    Hemoglobin 16.5 (H) 11.7 - 15.7 g/dL    Hematocrit 47.4 (H) 35.0 - 47.0 %    MCV 86 78 - 100 fl    MCH 29.9 26.5 - 33.0 pg    MCHC 34.8 31.5 - 36.5 g/dL    RDW 14.2 10.0 - 15.0 %    Platelet Count 1067 (HH) 150 - 450 10e9/L    Diff Method Pending      DIAGNOSTICS:                                                    EKG: appears normal, NSR, normal axis, normal intervals, no acute ST/T changes c/w ischemia, no LVH by voltage criteria, unchanged from previous tracings    Recent Labs   Lab Test  12/29/16   0946  09/23/16   0840  05/18/16   1611   10/23/15   0837   09/09/14   0903   08/09/12   1131   HGB  17.1*  17.2*  15.8*   < >   --    < >  14.8   < >   --    PLT  620*  743*  674*   < >   --    < >  477*   < >   --    INR   --    --    --    --    --    --   1.00   --   1.96*   NA   --   135  135   < >  134   < >  138   < >   --    POTASSIUM   --   4.2  4.0   < >  4.0   < >  3.7   < >   --    CR   --   0.65  0.74   < >  0.69   < >  0.73   < >   --    A1C   --   5.6   --    --   5.3   --    --    < >   --     < > = values in this interval not displayed.        IMPRESSION:                                                    Reason for surgery/procedure:     ICD-10-CM    1. Preop general physical exam Z01.818 EKG 12-lead complete w/read - Clinics      CBC with platelets and differential     Basic metabolic panel   2. Rotator cuff injury, left, subsequent encounter S46.002D    3. Biceps tendon tear S46.219A    4. Essential hypertension with goal blood pressure less than 140/90 I10 Basic metabolic panel   5. Thrombocytosis (H) D47.3 ONC/HEME ADULT REFERRAL   6. Mild intermittent asthma without complication J45.20    7. Malignant neoplasm of left breast in female, estrogen receptor positive, unspecified site of breast (H) - left mastectomy in 2000 - doing well.  C50.912     Z17.0    8. Strain of biceps tendon, left, initial encounter- ? rupture of the medial head of distal end of the biceps tendon S46.212A acetaminophen-codeine (TYLENOL/CODEINE #3) 300-30 MG per tablet   9. Hematoma of arm, left, initial encounter- ? secondary to rupture of medial end of distal biceps tendon  S40.022A acetaminophen-codeine (TYLENOL/CODEINE #3) 300-30 MG per tablet       The proposed surgical procedure is considered INTERMEDIATE risk.    REVISED CARDIAC RISK INDEX  The patient has the following serious cardiovascular risks for perioperative complications such as (MI, PE, VFib and 3  AV Block):  No serious cardiac risks  INTERPRETATION: 0 risks: Class I (very low risk - 0.4% complication rate)    The patient has the following additional risks for perioperative complications:  No identified additional risks      ICD-10-CM    1. Preop general physical exam Z01.818 EKG 12-lead complete w/read - Clinics     CBC with platelets and differential     Basic metabolic panel   2. Rotator cuff injury, left, subsequent encounter S46.002D    3. Biceps tendon tear S46.219A    4. Essential hypertension with goal blood pressure less than 140/90 I10 Basic metabolic panel   5. Thrombocytosis (H) D47.3 ONC/HEME ADULT REFERRAL   6. Mild intermittent asthma without complication J45.20    7. Malignant neoplasm of left breast in female, estrogen receptor positive, unspecified site of breast (H) - left  mastectomy in 2000 - doing well.  C50.912     Z17.0    8. Strain of biceps tendon, left, initial encounter- ? rupture of the medial head of distal end of the biceps tendon S46.212A acetaminophen-codeine (TYLENOL/CODEINE #3) 300-30 MG per tablet   9. Hematoma of arm, left, initial encounter- ? secondary to rupture of medial end of distal biceps tendon  S40.022A acetaminophen-codeine (TYLENOL/CODEINE #3) 300-30 MG per tablet   10. Erythrocytosis D75.1 ONC/HEME ADULT REFERRAL   11. Leukocytosis, unspecified type D72.829 ONC/HEME ADULT REFERRAL       RECOMMENDATIONS:                                                        Hematology consult recommended regarding her persistently elevated blood counts.    Surgery is NOT recommended due to abnormal blood counts that must be corrected prior to elective surgery.  treatment plan: hematology referral and bone marrow biopsy is planned - further plan per hematology - will notify surgeon       Signed Electronically by: Obed Andrade MD    Copy of this evaluation report is provided to requesting physician.    [facility] Preop Guidelines

## 2017-07-17 PROBLEM — D75.839 THROMBOCYTOSIS: Status: ACTIVE | Noted: 2017-01-01

## 2017-07-17 NOTE — NURSING NOTE
"Chief Complaint   Patient presents with     Pre-Op Exam       Initial /80 (BP Location: Right arm, Patient Position: Chair, Cuff Size: Adult Large)  Pulse 68  Temp 98.2  F (36.8  C) (Oral)  Ht 5' 7\" (1.702 m)  Wt 193 lb (87.5 kg)  LMP 09/21/2004  SpO2 99%  BMI 30.23 kg/m2 Estimated body mass index is 30.23 kg/(m^2) as calculated from the following:    Height as of this encounter: 5' 7\" (1.702 m).    Weight as of this encounter: 193 lb (87.5 kg).  Medication Reconciliation: complete  "

## 2017-07-17 NOTE — MR AVS SNAPSHOT
After Visit Summary   7/17/2017    Stefany Vega    MRN: [id number]           Patient Information     Date Of Birth          1955        Visit Information        Provider Department      7/17/2017 3:00 PM Obed Andrade MD [facility] [place]        Today's Diagnoses     Preop general physical exam    -  1    Rotator cuff injury, left, subsequent encounter        Biceps tendon tear        Essential hypertension with goal blood pressure less than 140/90        Thrombocytosis (H)        Mild intermittent asthma without complication        Malignant neoplasm of left breast in female, estrogen receptor positive, unspecified site of breast (H) - left mastectomy in 2000 - doing well.           Care Instructions      Before Your Surgery      Call your surgeon if there is any change in your health. This includes signs of a cold or flu (such as a sore throat, runny nose, cough, rash or fever).    Do not smoke, drink alcohol or take over the counter medicine (unless your surgeon or primary care doctor tells you to) for the 24 hours before and after surgery.    If you take prescribed drugs: Follow your doctor s orders about which medicines to take and which to stop until after surgery.    Eating and drinking prior to surgery: follow the instructions from your surgeon    Take a shower or bath the night before surgery. Use the soap your surgeon gave you to gently clean your skin. If you do not have soap from your surgeon, use your regular soap. Do not shave or scrub the surgery site.  Wear clean pajamas and have clean sheets on your bed.           Follow-ups after your visit        Additional Services     ONC/HEME ADULT REFERRAL       Your provider has referred you to: [facility]: [place] [facility] [place] [phone]   [email or web address]    Please be aware that coverage of these services is subject to the terms and limitations of your  health insurance plan.  Call member services at your health plan with any benefit or coverage questions.      Please bring the following with you to your appointment:    (1) Any X-Rays, CTs or MRIs which have been performed.  Contact the facility where they were done to arrange for  prior to your scheduled appointment.  Any new CT, MRI or other procedures ordered by your specialist must be performed at a [place] facility or coordinated by your clinic's referral office.  (2) List of current medications  (3) This referral request   (4) Any documents/labs given to you for this referral                  Your next 10 appointments already scheduled     Jul 24, 2017  7:20 AM CDT   OPAL Extremity with Himanshu Bill PT   [facility] ([facility])    [address] [facility] [id number] [phone]            Aug 02, 2017   Procedure with Hiro Al MD   [facility] PeriOp Services (--)    [address] [facility] [id number] [phone]            Aug 07, 2017  3:00 PM CDT   Return Visit with Corky Fuentes PA-C   [facility] ORTHOPEDIC SURGERY ([facility])    [address] [place] [address]  [address]  [facility] [address]   [phone]              Who to contact     If you have questions or need follow up information about today's clinic visit or your schedule please contact [facility] directly at [phone].  Normal or non-critical lab and imaging results will be communicated to you by MyChart, letter or phone within 4 business days after the clinic has received the results. If you do not hear from us within 7 days, please contact the clinic through MyChart or phone. If you have a critical or abnormal lab result, we will notify you by phone as soon as possible.  Submit refill requests through [employer] or call your pharmacy and they will forward the refill request to us. Please allow 3 business days for your refill to be  "completed.          Additional Information About Your Visit        [employer] Information     Intr[employer]lect lets you send messages to your doctor, view your test results, renew your prescriptions, schedule appointments and more. To sign up, go to [email or web address] . Click on \"Log in\" on the left side of the screen, which will take you to the Welcome page. Then click on \"Sign up Now\" on the right side of the page.     You will be asked to enter the access code listed below, as well as some personal information. Please follow the directions to create your username and password.     Your access code is: [email or web address]  Expires: 2017  4:31 PM     Your access code will  in 90 days. If you need help or a new code, please call your [facility] clinic or [phone].        Care EveryWhere ID     This is your Care EveryWhere ID. This could be used by other organizations to access your [facility] medical records  [id number]        Your Vitals Were     Pulse Temperature Height Last Period Pulse Oximetry BMI (Body Mass Index)    68 98.2  F (36.8  C) (Oral) 5' 7\" (1.702 m) 2004 99% 30.23 kg/m2       Blood Pressure from Last 3 Encounters:   17 120/80   07/10/17 126/70   17 138/82    Weight from Last 3 Encounters:   17 193 lb (87.5 kg)   07/10/17 198 lb (89.8 kg)   17 198 lb (89.8 kg)              We Performed the Following     Basic metabolic panel     Blood Morphology Pathologist Review     CBC with platelets and differential     EKG 12-lead complete w/read - Clinics     ONC/HEME ADULT REFERRAL          Today's Medication Changes          These changes are accurate as of: 17  4:20 PM.  If you have any questions, ask your nurse or doctor.               These medicines have changed or have updated prescriptions.        Dose/Directions    acetaminophen-codeine 300-30 MG per tablet   Commonly known as:  TYLENOL/codeine #3   This may have changed:  Another medication with the same name was " removed. Continue taking this medication, and follow the directions you see here.   Used for:  Strain of biceps tendon, left, initial encounter, Hematoma of arm, left, initial encounter   Changed by:  Lisa Mcguire MD        Dose:  1-2 tablet   Take 1-2 tablets by mouth every 4 hours as needed for mild pain   Quantity:  28 tablet   Refills:  0                Primary Care Provider Office Phone # Fax #    Lisa Mcguire [phone]       [facility] [address] [place] [address]        Equal Access to Services     : Hadii aad ku hadasho Soomaali, waaxda luqadaha, qaybta kaalmada adeegyazoë, debbie arriaza . So [facility] [phone].    ATENCIÓN: Si habla español, tiene a kenny disposición servicios gratuitos de asistencia lingüística. [facility] [phone].    We comply with applicable federal civil rights laws and [place] laws. We do not discriminate on the basis of race, color, national origin, age, disability sex, sexual orientation or gender identity.            Thank you!     Thank you for choosing [facility]  for your care. Our goal is always to provide you with excellent care. Hearing back from our patients is one way we can continue to improve our services. Please take a few minutes to complete the written survey that you may receive in the mail after your visit with us. Thank you!             Your Updated Medication List - Protect others around you: Learn how to safely use, store and throw away your medicines at www.disposemymeds.org.          This list is accurate as of: 7/17/17  4:20 PM.  Always use your most recent med list.                   Brand Name Dispense Instructions for use Diagnosis    acetaminophen-codeine 300-30 MG per tablet    TYLENOL/codeine #3    28 tablet    Take 1-2 tablets by mouth every 4 hours as needed for mild pain    Strain of biceps tendon, left, initial encounter,  Hematoma of arm, left, initial encounter       acyclovir 800 MG tablet    ZOVIRAX    18 tablet    Take one tablet by mouth three times daily  for 2 days    HSV (herpes simplex virus) infection       ALEVE PO      Take by mouth as needed for moderate pain        amLODIPine 5 MG tablet    NORVASC    90 tablet    TAKE ONE TABLET BY MOUTH ONE TIME DAILY    Essential hypertension with goal blood pressure less than 140/90       ANORO ELLIPTA 62.5-25 MCG/INH oral inhaler   Generic drug:  umeclidinium-vilanterol           buPROPion 150 MG 12 hr tablet    ZYBAN          calcium 600 + D 600-400 MG-UNIT per tablet   Generic drug:  calcium-vitamin D     60 tablet    Take 1 tablet by mouth 2 times daily        cetirizine 10 MG tablet    zyrTEC     Take 10 mg by mouth daily        cyanocolbalamin 500 MCG tablet    vitamin  B-12     Take 500 mcg by mouth daily        ferrous sulfate 142 (45 FE) MG Tbcr    SLO-FE    30 tablet    Take 142 mg by mouth every evening        fluticasone 50 MCG/ACT spray    FLONASE    48 mL    USE ONE OR TWO SPRAYS IN EACH NOSTRIL DAILY    Allergic state       glucosamine-chondroitin 500-400 MG Caps per capsule      Take 1 capsule by mouth At Bedtime        hydrochlorothiazide 25 MG tablet    HYDRODIURIL    90 tablet    TAKE 1 TABLET BY MOUTH EVERY DAY IN THE MORNING    Essential hypertension with goal blood pressure less than 140/90       HYDROcodone-acetaminophen 5-325 MG per tablet    NORCO    20 tablet    Take 1 tablet by mouth every 6 hours as needed for moderate to severe pain    Closed buckle fracture of wrist, right, initial encounter       metoprolol 25 MG tablet    LOPRESSOR    90 tablet    TAKE HALF TABLET BY MOUTH TWICE DAILY    Essential hypertension with goal blood pressure less than 140/90       mometasone 0.1 % cream    ELOCON    45 g    APPLY TOPICALLY TO HANDS FOR UP TO 14 DAYS AT A TIME    Dermatitis       omeprazole 40 MG capsule    priLOSEC    180 capsule    TAKE ONE CAPSULE BY MOUTH  TWICE DAILY    GERD (gastroesophageal reflux disease)       potassium 99 MG Tabs      Take 1 tablet by mouth daily        sucralfate 1 GM tablet    CARAFATE    270 tablet    Take 1 tablet (1 g) by mouth 3 times daily    GERD (gastroesophageal reflux disease), Dyspepsia, Morbid obesity (H)       Vitamin D (Cholecalciferol) 1000 UNITS Tabs      Take 1 tablet by mouth daily

## 2017-07-20 NOTE — TELEPHONE ENCOUNTER
Received voicemail from [facility].   Patient had pre-op physical by Dr. Andrade and was referred to hematology for persistent thrombocytosis and erythrocytosis. Surgery may need to be postponed.   Patient scheduled for  Left shoulder arthroscopy, decompression, distal clavicle resection, rotator cuff repair  on 8/02/2017 with Dr. Al.   Patient has bone marrow biopsy scheduled for 7/24/17.   They would like a call back with Dr. Al's recommendations.     Dr. Al is out of the office until 7/24/17. He will review this message upon return to the office.     Routing to PCP for FYI.     TANYA Ludwig RN

## 2017-07-24 NOTE — IP AVS SNAPSHOT
MRN:[id number]                      After Visit Summary   7/24/2017    Stefany Vega    MRN: [id number]           Thank you!     Thank you for choosing [facility] for your care. Our goal is always to provide you with excellent care. Hearing back from our patients is one way we can continue to improve our services. Please take a few minutes to complete the written survey that you may receive in the mail after you visit. If you would like to speak to someone directly about your visit please contact Patient Relations at [phone]. Thank you!          Patient Information     Date Of Birth          1955        About your hospital stay     You were admitted on:  July 24, 2017 You last received care in the:  [facility] PreOP/PostOP    You were discharged on:  July 24, 2017       Who to Call     For medical emergencies, please call [phone].  For non-urgent questions about your medical care, please call your primary care provider or clinic, [phone]  For questions related to your surgery, please call your surgery clinic        Attending Provider     Provider Specialty    Nicolas Mcclellan MD Pathology       Primary Care Provider Office Phone # Fax #    Lisa HANANE Mcguire [phone]      Your next 10 appointments already scheduled     Jul 27, 2017  6:50 AM CDT   OPAL Extremity with Himanshu Bill PT   [facility] ([facility])    [address] [facility] [place] [id number] [phone]            Aug 02, 2017   Procedure with Hiro Al MD   [facility] PeriOp Services (--)    [address] [facility] [address]   [phone]            Aug 07, 2017  3:00 PM CDT   Return Visit with Corky Fuentes PA-C   [facility] ORTHOPEDIC SURGERY ([facility])    [address] [facility]  [address]  [facility] [address]   [phone]              Further instructions from your care team        GENERAL ANESTHESIA OR SEDATION ADULT DISCHARGE INSTRUCTIONS   SPECIAL PRECAUTIONS FOR 24 HOURS AFTER SURGERY    IT IS NOT UNUSUAL TO FEEL LIGHT-HEADED OR FAINT, UP TO 24 HOURS AFTER SURGERY OR WHILE TAKING PAIN MEDICATION.  IF YOU HAVE THESE SYMPTOMS; SIT FOR A FEW MINUTES BEFORE STANDING AND HAVE SOMEONE ASSIST YOU WHEN YOU GET UP TO WALK OR USE THE BATHROOM.    YOU SHOULD REST AND RELAX FOR THE NEXT 24 HOURS AND YOU MUST MAKE ARRANGEMENTS TO HAVE SOMEONE STAY WITH YOU FOR AT LEAST 24 HOURS AFTER YOUR DISCHARGE.  AVOID HAZARDOUS AND STRENUOUS ACTIVITIES.  DO NOT MAKE IMPORTANT DECISIONS FOR 24 HOURS.    DO NOT DRIVE ANY VEHICLE OR OPERATE MECHANICAL EQUIPMENT FOR 24 HOURS FOLLOWING THE END OF YOUR SURGERY.  EVEN THOUGH YOU MAY FEEL NORMAL, YOUR REACTIONS MAY BE AFFECTED BY THE MEDICATION YOU HAVE RECEIVED.    DO NOT DRINK ALCOHOLIC BEVERAGES FOR 24 HOURS FOLLOWING YOUR SURGERY.    DRINK CLEAR LIQUIDS (APPLE JUICE, GINGER ALE, 7-UP, BROTH, ETC.).  PROGRESS TO YOUR REGULAR DIET AS YOU FEEL ABLE.    YOU MAY HAVE A DRY MOUTH, A SORE THROAT, MUSCLES ACHES OR TROUBLE SLEEPING.  THESE SHOULD GO AWAY AFTER 24 HOURS.    CALL YOUR DOCTOR FOR ANY OF THE FOLLOWING:  SIGNS OF INFECTION (FEVER, GROWING TENDERNESS AT THE SURGERY SITE, A LARGE AMOUNT OF DRAINAGE OR BLEEDING, SEVERE PAIN, FOUL-SMELLING DRAINAGE, REDNESS OR SWELLING.    IT HAS BEEN OVER 8 TO 10 HOURS SINCE SURGERY AND YOU ARE STILL NOT ABLE TO URINATE (PASS WATER).     BONE MARROW BIOPSY DISCHARGE INSTRUCTIONS  NATALIE CHANDLER, BENITA ALMONTE & ZHANG JOLLEY    HOW SHOULD I CARE FOR MYSELF AFTERWARD?    If you have had medicine to help you relax:    1.  Someone will need to drive you home.  2.  You may have a brief period of amnesia.  You may not remember the biopsy.  3.  You may have a headache, nausea (feeling sick to your stomach) and vomiting (throwing up).  4.  Do not drive or use heavy machinery for 24 hours.  5.  Do not drink alcohol for 24 hours.  6.  You may go  "back to your normal diet, activities and medicines as you feel able.    Whether or not you had medicine to help you relax:    1.  Keep the bandage dry and in place for 24 hours.  After that, you may remove it and take a shower.  You may see some bruising at the needle site.  2.  Bleeding is rare.  If the amount of blood is larger than the size of a quarter, lie on the floor for 30 minutes to put pressure on the needle site.  If you are still bleeding after that, call your doctor.  3.  Infection is rare.  If you have signs of infection, call your doctor or clinic at once.    Symptoms include:  - redness, pain, warmth or swelling at the site  - a fever of 100.5 degrees F or higher, taken under the tongue.    WHEN WILL I GET MY RESULTS?    In most cases, your doctor will have the results within 72 hours.        Pending Results     No orders found from 7/22/2017 to 7/25/2017.            Admission Information     Date & Time Provider Department Dept. Phone    7/24/2017 Nicolas Mcclellan MD [facility] PreOP/PostOP [phone]      Your Vitals Were     Blood Pressure Pulse Temperature Respirations Height Weight    120/69 58 99  F (37.2  C) (Temporal) 16 1.702 m (5' 7\") 87.5 kg (193 lb)    Last Period Pulse Oximetry BMI (Body Mass Index)             09/21/2004 96% 30.23 kg/m2         MyChart Information     [employer] gives you secure access to your electronic health record. If you see a primary care provider, you can also send messages to your care team and make appointments. If you have questions, please call your primary care clinic.  If you do not have a primary care provider, please call [phone] and they will assist you.        Care EveryWhere ID     This is your Care EveryWhere ID. This could be used by other organizations to access your [place] medical records  [id number]        Equal Access to Services     SHARITA PEREZ AH: Jerrell Mendez, tonio venegas, debbie calhoun " leticia geronimojacquelinerafa antonio ceron'aan ah. So [facility] [phone].    ATENCIÓN: Si andrew ivory, tiene a kenny disposición servicios gratuitos de asistencia lingüística. Caitlin al [phone].    We comply with applicable federal civil rights laws and [place] laws. We do not discriminate on the basis of race, color, national origin, age, disability sex, sexual orientation or gender identity.               Review of your medicines      UNREVIEWED medicines. Ask your doctor about these medicines        Dose / Directions    acetaminophen-codeine 300-30 MG per tablet   Commonly known as:  TYLENOL/codeine #3   Used for:  Strain of biceps tendon, left, initial encounter, Hematoma of arm, left, initial encounter        Dose:  1-2 tablet   Take 1-2 tablets by mouth every 4 hours as needed for mild pain   Quantity:  28 tablet   Refills:  0       acyclovir 800 MG tablet   Commonly known as:  ZOVIRAX   Used for:  HSV (herpes simplex virus) infection        Take one tablet by mouth three times daily  for 2 days   Quantity:  18 tablet   Refills:  12       AMLODIPINE BESYLATE PO        Dose:  5 mg   Take 5 mg by mouth every evening   Refills:  0       ANORO ELLIPTA 62.5-25 MCG/INH oral inhaler   Generic drug:  umeclidinium-vilanterol        Dose:  1 puff   Inhale 1 puff into the lungs daily   Refills:  11       buPROPion 150 MG 12 hr tablet   Commonly known as:  ZYBAN        Dose:  150 mg   Take 150 mg by mouth 2 times daily   Refills:  5       calcium 600 + D 600-400 MG-UNIT per tablet   Generic drug:  calcium-vitamin D        Dose:  1 tablet   Take 1 tablet by mouth 2 times daily   Quantity:  60 tablet   Refills:  0       cetirizine 10 MG tablet   Commonly known as:  zyrTEC        Dose:  10 mg   Take 10 mg by mouth daily   Refills:  0       cyanocolbalamin 500 MCG tablet   Commonly known as:  vitamin  B-12        Dose:  500 mcg   Take 500 mcg by mouth daily   Refills:  0       ferrous sulfate 142 (45 FE) MG Tbcr   Commonly known as:   SLO-FE        Dose:  142 mg   Take 142 mg by mouth every evening   Quantity:  30 tablet   Refills:  0       fluticasone 50 MCG/ACT spray   Commonly known as:  FLONASE        Dose:  1-2 spray   Spray 1-2 sprays into both nostrils daily as needed for rhinitis or allergies   Refills:  0       glucosamine-chondroitin 500-400 MG Caps per capsule        Dose:  1 capsule   Take 1 capsule by mouth At Bedtime   Refills:  0       hydrochlorothiazide 25 MG tablet   Commonly known as:  HYDRODIURIL   Used for:  Essential hypertension with goal blood pressure less than 140/90        TAKE 1 TABLET BY MOUTH EVERY DAY IN THE MORNING   Quantity:  90 tablet   Refills:  0       LOPRESSOR PO        Dose:  25 mg   Take 25 mg by mouth every morning   Refills:  0       mometasone 0.1 % cream   Commonly known as:  ELOCON   Used for:  Dermatitis        APPLY TOPICALLY TO HANDS FOR UP TO 14 DAYS AT A TIME   Quantity:  45 g   Refills:  1       omeprazole 40 MG capsule   Commonly known as:  priLOSEC   Used for:  GERD (gastroesophageal reflux disease)        TAKE ONE CAPSULE BY MOUTH TWICE DAILY   Quantity:  180 capsule   Refills:  3       potassium 99 MG Tabs        Dose:  1 tablet   Take 1 tablet by mouth daily   Refills:  0       sucralfate 1 GM tablet   Commonly known as:  CARAFATE        Dose:  1 g   Take 1 g by mouth 4 times daily as needed   Refills:  0       Vitamin D (Cholecalciferol) 1000 UNITS Tabs        Dose:  1 tablet   Take 1 tablet by mouth daily   Refills:  0                Protect others around you: Learn how to safely use, store and throw away your medicines at www.disposemymeds.org.             Medication List: This is a list of all your medications and when to take them. Check marks below indicate your daily home schedule. Keep this list as a reference.      Medications           Morning Afternoon Evening Bedtime As Needed    acetaminophen-codeine 300-30 MG per tablet   Commonly known as:  TYLENOL/codeine #3   Take 1-2  tablets by mouth every 4 hours as needed for mild pain                                acyclovir 800 MG tablet   Commonly known as:  ZOVIRAX   Take one tablet by mouth three times daily  for 2 days                                AMLODIPINE BESYLATE PO   Take 5 mg by mouth every evening                                ANORO ELLIPTA 62.5-25 MCG/INH oral inhaler   Inhale 1 puff into the lungs daily   Generic drug:  umeclidinium-vilanterol                                buPROPion 150 MG 12 hr tablet   Commonly known as:  ZYBAN   Take 150 mg by mouth 2 times daily                                calcium 600 + D 600-400 MG-UNIT per tablet   Take 1 tablet by mouth 2 times daily   Generic drug:  calcium-vitamin D                                cetirizine 10 MG tablet   Commonly known as:  zyrTEC   Take 10 mg by mouth daily                                cyanocolbalamin 500 MCG tablet   Commonly known as:  vitamin  B-12   Take 500 mcg by mouth daily                                ferrous sulfate 142 (45 FE) MG Tbcr   Commonly known as:  SLO-FE   Take 142 mg by mouth every evening                                fluticasone 50 MCG/ACT spray   Commonly known as:  FLONASE   Spray 1-2 sprays into both nostrils daily as needed for rhinitis or allergies                                glucosamine-chondroitin 500-400 MG Caps per capsule   Take 1 capsule by mouth At Bedtime                                hydrochlorothiazide 25 MG tablet   Commonly known as:  HYDRODIURIL   TAKE 1 TABLET BY MOUTH EVERY DAY IN THE MORNING                                LOPRESSOR PO   Take 25 mg by mouth every morning                                mometasone 0.1 % cream   Commonly known as:  ELOCON   APPLY TOPICALLY TO HANDS FOR UP TO 14 DAYS AT A TIME                                omeprazole 40 MG capsule   Commonly known as:  priLOSEC   TAKE ONE CAPSULE BY MOUTH TWICE DAILY                                potassium 99 MG Tabs   Take 1 tablet by mouth  daily                                sucralfate 1 GM tablet   Commonly known as:  CARAFATE   Take 1 g by mouth 4 times daily as needed                                Vitamin D (Cholecalciferol) 1000 UNITS Tabs   Take 1 tablet by mouth daily

## 2017-07-24 NOTE — PROCEDURES
Bone marrow Biopsy.   Indication: Thrombocytosis, leukocytosis, erythrocytosis.  The patient's identification was positively verified.  Risks and benefits to include but not limited to pain, bleeding, infection, need for addtional biopsies etc, were discussed with the patient.  An opportunity for questions and answers was given and questions were answered.  Signed informed consent was obtained.  The patient was placed in the prone position.  Medical staff reconfirmed the patient's name, date of birth, procedure, and the procedure site marking. The right posterior iliac crest was sterilely prepped in the usual fashion.   5 mL of 1% Lidocaine was used for local anesthesia. Trephine bone marrow core was obtained from the right posterior iliac crest with a 8 gauge needle. 30 cc bone marrow aspirate was obtained from the right posterior iliac crest with a 15 gauge needle.  There were no complications and the patient tolerated the procedure well. The biopsy site was cleaned with alcohol and a sterile dressing was placed over the biopsy incision using a pressure bandage. Post-procedure wound care instructions, including routine dressing instructions and analgesia, were given to the patient.

## 2017-07-24 NOTE — DISCHARGE INSTRUCTIONS
GENERAL ANESTHESIA OR SEDATION ADULT DISCHARGE INSTRUCTIONS   SPECIAL PRECAUTIONS FOR 24 HOURS AFTER SURGERY    IT IS NOT UNUSUAL TO FEEL LIGHT-HEADED OR FAINT, UP TO 24 HOURS AFTER SURGERY OR WHILE TAKING PAIN MEDICATION.  IF YOU HAVE THESE SYMPTOMS; SIT FOR A FEW MINUTES BEFORE STANDING AND HAVE SOMEONE ASSIST YOU WHEN YOU GET UP TO WALK OR USE THE BATHROOM.    YOU SHOULD REST AND RELAX FOR THE NEXT 24 HOURS AND YOU MUST MAKE ARRANGEMENTS TO HAVE SOMEONE STAY WITH YOU FOR AT LEAST 24 HOURS AFTER YOUR DISCHARGE.  AVOID HAZARDOUS AND STRENUOUS ACTIVITIES.  DO NOT MAKE IMPORTANT DECISIONS FOR 24 HOURS.    DO NOT DRIVE ANY VEHICLE OR OPERATE MECHANICAL EQUIPMENT FOR 24 HOURS FOLLOWING THE END OF YOUR SURGERY.  EVEN THOUGH YOU MAY FEEL NORMAL, YOUR REACTIONS MAY BE AFFECTED BY THE MEDICATION YOU HAVE RECEIVED.    DO NOT DRINK ALCOHOLIC BEVERAGES FOR 24 HOURS FOLLOWING YOUR SURGERY.    DRINK CLEAR LIQUIDS (APPLE JUICE, GINGER ALE, 7-UP, BROTH, ETC.).  PROGRESS TO YOUR REGULAR DIET AS YOU FEEL ABLE.    YOU MAY HAVE A DRY MOUTH, A SORE THROAT, MUSCLES ACHES OR TROUBLE SLEEPING.  THESE SHOULD GO AWAY AFTER 24 HOURS.    CALL YOUR DOCTOR FOR ANY OF THE FOLLOWING:  SIGNS OF INFECTION (FEVER, GROWING TENDERNESS AT THE SURGERY SITE, A LARGE AMOUNT OF DRAINAGE OR BLEEDING, SEVERE PAIN, FOUL-SMELLING DRAINAGE, REDNESS OR SWELLING.    IT HAS BEEN OVER 8 TO 10 HOURS SINCE SURGERY AND YOU ARE STILL NOT ABLE TO URINATE (PASS WATER).     BONE MARROW BIOPSY DISCHARGE INSTRUCTIONS  NATALIE CHANDLER, BENITA ALMONTE & ZHANG JOLLEY    HOW SHOULD I CARE FOR MYSELF AFTERWARD?    If you have had medicine to help you relax:    1.  Someone will need to drive you home.  2.  You may have a brief period of amnesia.  You may not remember the biopsy.  3.  You may have a headache, nausea (feeling sick to your stomach) and vomiting (throwing up).  4.  Do not drive or use heavy machinery for 24 hours.  5.  Do not drink alcohol for 24 hours.  6.  You may go  back to your normal diet, activities and medicines as you feel able.    Whether or not you had medicine to help you relax:    1.  Keep the bandage dry and in place for 24 hours.  After that, you may remove it and take a shower.  You may see some bruising at the needle site.  2.  Bleeding is rare.  If the amount of blood is larger than the size of a quarter, lie on the floor for 30 minutes to put pressure on the needle site.  If you are still bleeding after that, call your doctor.  3.  Infection is rare.  If you have signs of infection, call your doctor or clinic at once.    Symptoms include:  - redness, pain, warmth or swelling at the site  - a fever of 100.5 degrees F or higher, taken under the tongue.    WHEN WILL I GET MY RESULTS?    In most cases, your doctor will have the results within 72 hours.

## 2017-07-24 NOTE — ANESTHESIA PREPROCEDURE EVALUATION
Anesthesia Evaluation     . Pt has had prior anesthetic. Type: General    No history of anesthetic complications          ROS/MED HX    ENT/Pulmonary:     (+)sleep apnea (OSAS and CPAP before GIB), tobacco use, Current use Intermittent asthma , . .   (-) COPD and recent URI   Neurologic:  - neg neurologic ROS    (-) seizures and CVA   Cardiovascular:     (+) Dyslipidemia, hypertension----. : . . . :. . Previous cardiac testing date:results:date: results:ECG reviewed date:7/17 results:Sinus 52 date: results:         (-) CAD, arrhythmias and valvular problems/murmurs   METS/Exercise Tolerance:     Hematologic: Comments: Lab Test        07/24/17 07/17/17 12/29/16      --          09/09/14      --          08/09/12      --          06/16/09                       0828          1542          0946           --           0903           --           1131           --           1024          WBC          10.4         15.5*        10.6           < >        8.4            < >         --            < >        8.4           HGB          16.1*        16.5*        17.1*          < >        14.8           < >         --            < >        14.9          MCV          87           86           88             < >        79             < >         --            < >        87            PLT          957*         1067*        620*           < >        477*           < >         --            < >        259           INR           --           --           --           --          1.00          --          1.96*         --          1.00           < > = values in this interval not displayed.                  Lab Test        07/17/17 09/23/16 05/18/16                       1542          0840          1611          NA           134          135          135           POTASSIUM    4.6          4.2          4.0           CHLORIDE     98           101          99            CO2          22           28           32             BUN          14           9            11            CR           0.76         0.65         0.74          ANIONGAP     14           6            4             BEV          9.7          9.3          9.1           GLC          48*          84           79              (+) Other Hematologic Disorder-polycythemia, etc.      Musculoskeletal:  - neg musculoskeletal ROS       GI/Hepatic:     (+) GERD Other GI/Hepatic ischemic colitis     (-) hepatitis and liver disease   Renal/Genitourinary:  - ROS Renal section negative       Endo:     (+) Obesity, .   (-) Type I DM, Type II DM, thyroid disease and chronic steroid usage   Psychiatric:     (+) psychiatric history depression      Infectious Disease:  - neg infectious disease ROS       Malignancy:   (+) Malignancy History of Breast          Other:    - neg other ROS                 Physical Exam  Normal systems: cardiovascular, pulmonary and dental    Airway   Mallampati: II  TM distance: >3 FB  Neck ROM: full    Dental     Cardiovascular   Rhythm and rate: regular and normal  (-) no friction rub, no systolic click and no murmur    Pulmonary    breath sounds clear to auscultation(-) no rhonchi, no decreased breath sounds, no wheezes, no rales and no stridor                    Anesthesia Plan      History & Physical Review  History and physical reviewed and following examination; no interval change.    ASA Status:  2 .    NPO Status:  > 8 hours    Plan for MAC with Intravenous induction. Reason for MAC:  Deep or markedly invasive procedure (G8)  PONV prophylaxis:  Ondansetron (or other 5HT-3) and Dexamethasone or Solumedrol       Postoperative Care  Postoperative pain management:  IV analgesics.      Consents  Anesthetic plan, risks, benefits and alternatives discussed with:  Patient..                          .

## 2017-07-24 NOTE — ANESTHESIA POSTPROCEDURE EVALUATION
Patient: Stefany Vega    Procedure(s):  BIOPSY BONE MARROW     Diagnosis:progressive thrombocytosis, leukocytosis  Diagnosis Additional Information: progressive thrombocytosis, leukocytosis    Anesthesia Type:  MAC    Note:  Anesthesia Post Evaluation    Patient location during evaluation: PACU  Patient participation: Able to fully participate in evaluation  Level of consciousness: awake  Airway patency: patent  Cardiovascular status: acceptable  Respiratory status: acceptable  Hydration status: acceptable  PONV: controlled     Anesthetic complications: None          Last vitals:  Vitals:    07/24/17 1000 07/24/17 1005 07/24/17 1015   BP: 113/52  120/69   Pulse:      Resp:   16   Temp: 98.3  F (36.8  C)  99  F (37.2  C)   SpO2:  97% 96%         Electronically Signed By: Myke Lu MD  July 24, 2017  10:32 AM

## 2017-07-24 NOTE — IP AVS SNAPSHOT
[facility] PreOP/PostOP    [address]    [facility] [address]    Phone:  [phone]    Fax:  [phone]                                       After Visit Summary   7/24/2017    Stefany Vega    MRN: [id number]           After Visit Summary Signature Page     I have received my discharge instructions, and my questions have been answered. I have discussed any challenges I see with this plan with the nurse or doctor.    ..........................................................................................................................................  Patient/Patient Representative Signature      ..........................................................................................................................................  Patient Representative Print Name and Relationship to Patient    ..................................................               ................................................  Date                                            Time    ..........................................................................................................................................  Reviewed by Signature/Title    ...................................................              ..............................................  Date                                                            Time

## 2017-07-25 NOTE — TELEPHONE ENCOUNTER
Stefany called to cancel surgery for 8/02/2017 with Dr. Al @ [facility] @4:00.  She will reschedule to to health issues.

## 2017-07-25 NOTE — TELEPHONE ENCOUNTER
Cheryl, nurse at [facility], called regarding patient, would like to discuss plan of care as Dr Andrade does not recommend surgery at this time.  She is requesting call back - [phone].

## 2017-07-26 NOTE — TELEPHONE ENCOUNTER
Spoke with Dr Al, at this time he was aware surgery had been cancelled, pt will reschedule when appropriate.    Attempted to return call to Cheryl, she was unavailable.

## 2017-07-27 NOTE — TELEPHONE ENCOUNTER
Phone call to Dr. Raymond Luna's office and informed of Dr. Al's recommendations below.     TANYA Ludwig RN

## 2017-08-07 NOTE — TELEPHONE ENCOUNTER
hydrochlorothiazide (HYDRODIURIL) 25 MG tablet      Last Written Prescription Date: 5/7/2017  Last Fill Quantity: 90 tablet, # refills: 0  Last Office Visit with [facility], [facility] or [facility] prescribing provider: 7/17/2017       Potassium   Date Value Ref Range Status   07/17/2017 4.6 3.4 - 5.3 mmol/L Final     Creatinine   Date Value Ref Range Status   07/17/2017 0.76 0.52 - 1.04 mg/dL Final     BP Readings from Last 3 Encounters:   07/24/17 126/75   07/17/17 120/80   07/10/17 126/70

## 2017-08-22 NOTE — TELEPHONE ENCOUNTER
AMLODIPINE BESYLATE PO      Last Written Prescription Date: 0  Last Fill Quantity: 0, # refills: 0    Last Office Visit with [facility] or [facility] prescribing provider:  7.17.17   Future Office Visit:        BP Readings from Last 3 Encounters:   07/24/17 126/75   07/17/17 120/80   07/10/17 126/70

## 2017-08-22 NOTE — TELEPHONE ENCOUNTER
Patient states she is taking Amlodipine 5 mg at night for blood pressure.  Per chart review it looks like she was in hospital and it was changed to historical.    Prescription approved per [facility] Refill Protocol.      WIL Sheppard, RN, [facility] [phone]

## 2017-11-24 NOTE — PROGRESS NOTES
"HISTORY OF PRESENT ILLNESS:    Stefany Vega is a 62 year old female who is seen in follow up for left shoulder.  Present symptoms: Pt states shoulder continues to hurt, has been doing PT and ice.  Pt states by the end of the week pain radiates to the elbow and wrist.  Pt states she has difficulty sleeping.   Surgery was cancelled due to other health issues which are now under control, would like to schedule surgery at this time.  Treatments tried to this point: MRI, sling, Ice, Tylenol #3, Tylenol, had a cortisone injection on 5/24/17  Past Medical History: relatively unchanged from the visit of 7/6/2017. Please refer to that note.    REVIEW OF SYSTEMS:  CONSTITUTIONAL:  NEGATIVE for fever, chills, change in weight  INTEGUMENTARY/SKIN:  NEGATIVE for worrisome rashes, moles or lesions  EYES:  NEGATIVE for vision changes or irritation  ENT/MOUTH:  NEGATIVE for ear, mouth and throat problems  RESP:  NEGATIVE for significant cough or SOB  BREAST:  NEGATIVE for masses, tenderness or discharge  CV:  NEGATIVE for chest pain, palpitations or peripheral edema  GI:  NEGATIVE for nausea, abdominal pain, heartburn, or change in bowel habits  :  Negative   MUSCULOSKELETAL:  See HPI above  NEURO:  NEGATIVE for weakness, dizziness or paresthesias  ENDOCRINE:  NEGATIVE for temperature intolerance, skin/hair changes  HEME/ALLERGY/IMMUNE:  NEGATIVE for bleeding problems  PSYCHIATRIC:  NEGATIVE for changes in mood or affect    PHYSICAL EXAM:  /82 (BP Location: Right arm, Patient Position: Sitting, Cuff Size: Adult Regular)  Ht 5' 5.9\" (1.674 m)  Wt 195 lb (88.5 kg)  LMP 09/21/2004  BMI 31.57 kg/m2  Body mass index is 31.57 kg/(m^2).   GENERAL APPEARANCE: healthy, alert and no distress   SKIN: no suspicious lesions or rashes  NEURO: Normal strength and tone, mentation intact and speech normal  VASCULAR:  good pulses, and cappillary refill   LYMPH: no lymphadenopathy   PSYCH:  mentation appears normal and affect " normal/bright    MSK:  She actually looks healthier than her last visit  Gait normal  Presence of full range of motion noted  Persistent weakness of abduction and external rotation, left  Painful left shoulder range of motion    IMAGING INTERPRETATION:  None today     ASSESSMENT:  Chronic left shoulder pain with rotator cuff tear, impingement and acromioclavicular joint DJD  Improved blood count    PLAN:  We went over the diagnoses of impingement,  Acromioclavicular joint DJD and rotator cuff tear.  We reviewed the need for arm sling protection for one month and healing time of about four months as a minimum.  The nature of the surgery was once again reviewed.  Potential risks were reviewed.  He'll be set up for left shoulder arthroscopy, decompression, distal clavicle resection and rotator cuff to repair according to her convenience.           Hiro Al MD  Dept. Orthopedic Surgery  [facility]       Disclaimer: This note consists of symbols derived from keyboarding, dictation and/or voice recognition software. As a result, there may be errors in the script that have gone undetected. Please consider this when interpreting information found in this chart.

## 2017-11-24 NOTE — NURSING NOTE
"Chief Complaint   Patient presents with     RECHECK     Left shoulder       Initial /82 (BP Location: Right arm, Patient Position: Sitting, Cuff Size: Adult Regular)  Ht 5' 5.9\" (1.674 m)  Wt 195 lb (88.5 kg)  LMP 09/21/2004  BMI 31.57 kg/m2 Estimated body mass index is 31.57 kg/(m^2) as calculated from the following:    Height as of this encounter: 5' 5.9\" (1.674 m).    Weight as of this encounter: 195 lb (88.5 kg).  Medication Reconciliation: complete    "

## 2017-11-24 NOTE — MR AVS SNAPSHOT
After Visit Summary   11/24/2017    Stefany Vega    MRN: [id number]           Patient Information     Date Of Birth          1955        Visit Information        Provider Department      11/24/2017 9:30 AM Hiro Al MD [facility] ORTHOPEDIC SURGERY        Today's Diagnoses     Complete tear of left rotator cuff    -  1       Follow-ups after your visit        Your next 10 appointments already scheduled     Dec 22, 2017  8:15 AM CST   PHYSICAL with Lisa Mcguire MD   [facility]    [address] [place] [id number] [phone]              Who to contact     If you have questions or need follow up information about today's clinic visit or your schedule please contact [facility] ORTHOPEDIC SURGERY directly at [phone].  Normal or non-critical lab and imaging results will be communicated to you by MyChart, letter or phone within 4 business days after the clinic has received the results. If you do not hear from us within 7 days, please contact the clinic through [employer] or phone. If you have a critical or abnormal lab result, we will notify you by phone as soon as possible.  Submit refill requests through [employer] or call your pharmacy and they will forward the refill request to us. Please allow 3 business days for your refill to be completed.          Additional Information About Your Visit        MyChart Information     [employer] gives you secure access to your electronic health record. If you see a primary care provider, you can also send messages to your care team and make appointments. If you have questions, please call your primary care clinic.  If you do not have a primary care provider, please call [phone] and they will assist you.        Care EveryWhere ID     This is your Care EveryWhere ID. This could be used by other organizations to access your [facility]  "records  [id number]        Your Vitals Were     Height Last Period BMI (Body Mass Index)             5' 5.9\" (1.674 m) 09/21/2004 31.57 kg/m2          Blood Pressure from Last 3 Encounters:   11/24/17 130/82   07/24/17 126/75   07/17/17 120/80    Weight from Last 3 Encounters:   11/24/17 195 lb (88.5 kg)   07/24/17 193 lb (87.5 kg)   07/17/17 193 lb (87.5 kg)              Today, you had the following     No orders found for display       Primary Care Provider Office Phone # Fax #    Lisa Mcguire [phone]       [facility] [address]        Equal Access to Services     SHARITA PEREZ : Jerrell croweo Sojosi, waaxda luqadaha, qaybta kaalmada adeegyada, debbie arriaza . So [facility] [phone].    ATENCIÓN: Si habla español, tiene a kenny disposición servicios gratuitos de asistencia lingüística. Llame al [phone].    We comply with applicable federal civil rights laws and [place] laws. We do not discriminate on the basis of race, color, national origin, age, disability, sex, sexual orientation, or gender identity.            Thank you!     Thank you for choosing [facility] ORTHOPEDIC SURGERY  for your care. Our goal is always to provide you with excellent care. Hearing back from our patients is one way we can continue to improve our services. Please take a few minutes to complete the written survey that you may receive in the mail after your visit with us. Thank you!             Your Updated Medication List - Protect others around you: Learn how to safely use, store and throw away your medicines at www.disposemymeds.org.          This list is accurate as of: 11/24/17 10:04 AM.  Always use your most recent med list.                   Brand Name Dispense Instructions for use Diagnosis    acetaminophen-codeine 300-30 MG per tablet    TYLENOL WITH CODEINE #3    28 tablet    Take 1-2 tablets by mouth every 4 hours as needed for mild " pain    Strain of biceps tendon, left, initial encounter, Hematoma of arm, left, initial encounter       acyclovir 800 MG tablet    ZOVIRAX    18 tablet    Take one tablet by mouth three times daily  for 2 days    HSV (herpes simplex virus) infection       amLODIPine 5 MG tablet    NORVASC    90 tablet    Take 1 tablet (5 mg) by mouth every evening    Essential hypertension with goal blood pressure less than 140/90       ANORO ELLIPTA 62.5-25 MCG/INH oral inhaler   Generic drug:  umeclidinium-vilanterol      Inhale 1 puff into the lungs daily        buPROPion 150 MG 12 hr tablet    ZYBAN     Take 150 mg by mouth 2 times daily        calcium 600 + D 600-400 MG-UNIT per tablet   Generic drug:  calcium-vitamin D     60 tablet    Take 1 tablet by mouth 2 times daily        cetirizine 10 MG tablet    zyrTEC     Take 10 mg by mouth daily        cyanocolbalamin 500 MCG tablet    vitamin  B-12     Take 500 mcg by mouth daily        ferrous sulfate 142 (45 FE) MG Tbcr    SLO-FE    30 tablet    Take 142 mg by mouth every evening        fluticasone 50 MCG/ACT spray    FLONASE     Spray 1-2 sprays into both nostrils daily as needed for rhinitis or allergies        glucosamine-chondroitin 500-400 MG Caps per capsule      Take 1 capsule by mouth At Bedtime        hydrochlorothiazide 25 MG tablet    HYDRODIURIL    90 tablet    TAKE 1 TABLET BY MOUTH EVERY DAY IN THE MORNING    Essential hypertension with goal blood pressure less than 140/90       hydroxyurea 500 MG capsule CHEMO    HYDREA     Take by mouth daily        * LOPRESSOR PO      Take 25 mg by mouth every morning        * metoprolol 25 MG tablet    LOPRESSOR    90 tablet    TAKE HALF TABLET BY MOUTH TWICE DAILY    Essential hypertension with goal blood pressure less than 140/90       mometasone 0.1 % cream    ELOCON    45 g    APPLY TOPICALLY TO HANDS FOR UP TO 14 DAYS AT A TIME    Dermatitis       omeprazole 40 MG capsule    priLOSEC    180 capsule    TAKE ONE CAPSULE BY  MOUTH TWICE DAILY    GERD (gastroesophageal reflux disease)       potassium 99 MG Tabs      Take 1 tablet by mouth daily        sucralfate 1 GM tablet    CARAFATE     Take 1 g by mouth 4 times daily as needed        Vitamin D (Cholecalciferol) 1000 UNITS Tabs      Take 1 tablet by mouth daily        * Notice:  This list has 2 medication(s) that are the same as other medications prescribed for you. Read the directions carefully, and ask your doctor or other care provider to review them with you.

## 2017-11-24 NOTE — LETTER
"    11/24/2017         RE: Stefany Vega  [address] [address]        Dear Colleague,    Thank you for referring your patient, Stefany Vega, to the [facility] ORTH[facility] SURGERY. Please see a copy of my visit note below.    HISTORY OF PRESENT ILLNESS:    Stefany Vega is a 62 year old female who is seen in follow up for left shoulder.  Present symptoms: Pt states shoulder continues to hurt, has been doing PT and ice.  Pt states by the end of the week pain radiates to the elbow and wrist.  Pt states she has difficulty sleeping.   Surgery was cancelled due to other health issues which are now under control, would like to schedule surgery at this time.  Treatments tried to this point: MRI, sling, Ice, Tylenol #3, Tylenol, had a cortisone injection on 5/24/17  Past Medical History: relatively unchanged from the visit of 7/6/2017. Please refer to that note.    REVIEW OF SYSTEMS:  CONSTITUTIONAL:  NEGATIVE for fever, chills, change in weight  INTEGUMENTARY/SKIN:  NEGATIVE for worrisome rashes, moles or lesions  EYES:  NEGATIVE for vision changes or irritation  ENT/MOUTH:  NEGATIVE for ear, mouth and throat problems  RESP:  NEGATIVE for significant cough or SOB  BREAST:  NEGATIVE for masses, tenderness or discharge  CV:  NEGATIVE for chest pain, palpitations or peripheral edema  GI:  NEGATIVE for nausea, abdominal pain, heartburn, or change in bowel habits  :  Negative   MUSCULOSKELETAL:  See HPI above  NEURO:  NEGATIVE for weakness, dizziness or paresthesias  ENDOCRINE:  NEGATIVE for temperature intolerance, skin/hair changes  HEME/ALLERGY/IMMUNE:  NEGATIVE for bleeding problems  PSYCHIATRIC:  NEGATIVE for changes in mood or affect    PHYSICAL EXAM:  /82 (BP Location: Right arm, Patient Position: Sitting, Cuff Size: Adult Regular)  Ht 5' 5.9\" (1.674 m)  Wt 195 lb (88.5 kg)  LMP 09/21/2004  BMI 31.57 kg/m2  Body mass index is 31.57 kg/(m^2).   GENERAL APPEARANCE: " healthy, alert and no distress   SKIN: no suspicious lesions or rashes  NEURO: Normal strength and tone, mentation intact and speech normal  VASCULAR:  good pulses, and cappillary refill   LYMPH: no lymphadenopathy   PSYCH:  mentation appears normal and affect normal/bright    MSK:  She actually looks healthier than her last visit  Gait normal  Presence of full range of motion noted  Persistent weakness of abduction and external rotation, left  Painful left shoulder range of motion    IMAGING INTERPRETATION:  None today     ASSESSMENT:  Chronic left shoulder pain with rotator cuff tear, impingement and acromioclavicular joint DJD  Improved blood count    PLAN:  We went over the diagnoses of impingement,  Acromioclavicular joint DJD and rotator cuff tear.  We reviewed the need for arm sling protection for one month and healing time of about four months as a minimum.  The nature of the surgery was once again reviewed.  Potential risks were reviewed.  He'll be set up for left shoulder arthroscopy, decompression, distal clavicle resection and rotator cuff to repair according to her convenience.           Hiro Al MD  Dept. Orthopedic Surgery  [facility]       Disclaimer: This note consists of symbols derived from keyboarding, dictation and/or voice recognition software. As a result, there may be errors in the script that have gone undetected. Please consider this when interpreting information found in this chart.      Again, thank you for allowing me to participate in the care of your patient.        Sincerely,        Hiro Al MD

## 2017-11-27 NOTE — LETTER
[facility]  [address]  [address]  [place] [address]  [phone]          November 27, 2017    RE:  Stefany Vega                                                                                                                                                       [address] [place] [address]            To whom it may concern:    Stefany Vega is scheduled to have left shoulder surgery on 12/27/2017.  Please anticipate patient being off work 4-6 weeks following surgery for recovery.      Sincerely,        Hiro Al MD

## 2017-11-27 NOTE — TELEPHONE ENCOUNTER
This patient would like a call back regarding a letter for work. [phone].  Date of surgery 12/27/2017.

## 2017-11-27 NOTE — TELEPHONE ENCOUNTER
Scheduled surgery for Left shoulder arthroscopy, decompression, distal clavicle resection, rotator cuff  repair on 12/27/2017 with Dr. Al @ [facility] @ 12:00.  Surgery education packet provided to patient.

## 2017-11-27 NOTE — TELEPHONE ENCOUNTER
Letter to employer stating she will be off work.  Fax: [phone]  Attn: HR    Letter written and faxed as requested.

## 2017-11-27 NOTE — TELEPHONE ENCOUNTER
PT orders pending for (Pre PT only) for Left shoulder arthroscopy, decompression, distal clavicle resection, rotator cuff repair.   Please sign.

## 2017-12-01 NOTE — TELEPHONE ENCOUNTER
Our goal is to complete and return forms within 5-7 business days of receiving the request.    Type of form Requested: [facility]  Form requested by (include company):   Jose Daniel Xie    Phone number for requestor: [phone]  Date form is requested by: not listed    How will form be returned?:  fax to [phone]  Has the patient signed a consent form for release of information (may be included with form)? Yes  Additional comments: Pt is scheduled for surgery on 12/27/2017    Form was started and place in brown [facility] file for provider review/ completion at [facility].

## 2017-12-04 NOTE — TELEPHONE ENCOUNTER
Patient left voicemail stating she has surgery scheduled for 12/27/17 and has questions about paperwork she provided Dr. Al.     Please call patient on Tuesday.     TANYA Ludwig RN

## 2017-12-06 NOTE — TELEPHONE ENCOUNTER
Disability Eligibility form was signed and faxed for patient.  Fax indicated FMLA forms would be completed after surgery.

## 2017-12-09 NOTE — NURSING NOTE
"Chief Complaint   Patient presents with     Pre-Op Exam       Initial /78 (BP Location: Right arm, Patient Position: Sitting, Cuff Size: Adult Large)  Pulse 61  Temp 98  F (36.7  C) (Oral)  Ht 5' 5.9\" (1.674 m)  Wt 192 lb 14.4 oz (87.5 kg)  LMP 09/21/2004  SpO2 97%  BMI 31.23 kg/m2 Estimated body mass index is 31.23 kg/(m^2) as calculated from the following:    Height as of this encounter: 5' 5.9\" (1.674 m).    Weight as of this encounter: 192 lb 14.4 oz (87.5 kg).  Medication Reconciliation: complete   Selin Jackson MA    "

## 2017-12-09 NOTE — LETTER
My Depression Action Plan  Name: Stefany Vega   Date of Birth 1955  Date: 12/9/2017    My doctor: Lisa Mcguire   My clinic: [address] [address] [phone]          GREEN    ZONE   Good Control    What it looks like:     Things are going generally well. You have normal up s and down s. You may even feel depressed from time to time, but bad moods usually last less than a day.   What you need to do:  1. Continue to care for yourself (see self care plan)  2. Check your depression survival kit and update it as needed  3. Follow your physician s recommendations including any medication.  4. Do not stop taking medication unless you consult with your physician first.           YELLOW         ZONE Getting Worse    What it looks like:     Depression is starting to interfere with your life.     It may be hard to get out of bed; you may be starting to isolate yourself from others.    Symptoms of depression are starting to last most all day and this has happened for several days.     You may have suicidal thoughts but they are not constant.   What you need to do:     1. Call your care team, your response to treatment will improve if you keep your care team informed of your progress. Yellow periods are signs an adjustment may need to be made.     2. Continue your self-care, even if you have to fake it!    3. Talk to someone in your support network    4. Open up your depression survival kit           RED    ZONE Medical Alert - Get Help    What it looks like:     Depression is seriously interfering with your life.     You may experience these or other symptoms: You can t get out of bed most days, can t work or engage in other necessary activities, you have trouble taking care of basic hygiene, or basic responsibilities, thoughts of suicide or death that will not go away, self-injurious behavior.     What you need to do:  1. Call  your care team and request a same-day appointment. If they are not available (weekends or after hours) call your local crisis line, emergency room or 911.      Electronically signed by: Selin Jackson, December 9, 2017    Depression Self Care Plan / Survival Kit    Self-Care for Depression  Here s the deal. Your body and mind are really not as separate as most people think.  What you do and think affects how you feel and how you feel influences what you do and think. This means if you do things that people who feel good do, it will help you feel better.  Sometimes this is all it takes.  There is also a place for medication and therapy depending on how severe your depression is, so be sure to consult with your medical provider and/ or Behavioral Health Consultant if your symptoms are worsening or not improving.     In order to better manage my stress, I will:    Exercise  Get some form of exercise, every day. This will help reduce pain and release endorphins, the  feel good  chemicals in your brain. This is almost as good as taking antidepressants!  This is not the same as joining a gym and then never going! (they count on that by the way ) It can be as simple as just going for a walk or doing some gardening, anything that will get you moving.      Hygiene   Maintain good hygiene (Get out of bed in the morning, Make your bed, Brush your teeth, Take a shower, and Get dressed like you were going to work, even if you are unemployed).  If your clothes don't fit try to get ones that do.    Diet  I will strive to eat foods that are good for me, drink plenty of water, and avoid excessive sugar, caffeine, alcohol, and other mood-altering substances.  Some foods that are helpful in depression are: complex carbohydrates, B vitamins, flaxseed, fish or fish oil, fresh fruits and vegetables.    Psychotherapy  I agree to participate in Individual Therapy (if recommended).    Medication  If prescribed medications, I agree to take  them.  Missing doses can result in serious side effects.  I understand that drinking alcohol, or other illicit drug use, may cause potential side effects.  I will not stop my medication abruptly without first discussing it with my provider.    Staying Connected With Others  I will stay in touch with my friends, family members, and my primary care provider/team.    Use your imagination  Be creative.  We all have a creative side; it doesn t matter if it s oil painting, sand castles, or mud pies! This will also kick up the endorphins.    Witness Beauty  (AKA stop and smell the roses) Take a look outside, even in mid-winter. Notice colors, textures. Watch the squirrels and birds.     Service to others  Be of service to others.  There is always someone else in need.  By helping others we can  get out of ourselves  and remember the really important things.  This also provides opportunities for practicing all the other parts of the program.    Humor  Laugh and be silly!  Adjust your TV habits for less news and crime-drama and more comedy.    Control your stress  Try breathing deep, massage therapy, biofeedback, and meditation. Find time to relax each day.     My support system    Clinic Contact:  Phone number:    Contact 1:  Phone number:    Contact 2:  Phone number:    [place]/:  Phone number:    Therapist:  Phone number:    Local crisis center:    Phone number:    Other community support:  Phone number:

## 2017-12-09 NOTE — PROGRESS NOTES
[address] [address]  [phone]  Dept: [phone]    PRE-OP EVALUATION:  Today's date: 2017    Stefany Vega (: 1955) presents for pre-operative evaluation assessment as requested by Dr. Hiro Al.  She requires evaluation and anesthesia risk assessment prior to undergoing surgery/procedure for treatment of left rotator cuff tear.  Proposed procedure:  left shoulder arthoscopy, decompression, distal clavicle resection, rotator cuff repair.     Date of Surgery/ Procedure: 17  Time of Surgery/ Procedure: 12:00PM  Hospital/Surgical Facility: [place], [place]  Fax # -     Primary Physician: Lisa Mcguire  Type of Anesthesia Anticipated: to be determined    Patient has a Health Care Directive or Living Will:  NO    1. NO - Do you have a history of heart attack, stroke, stent, bypass or surgery on an artery in the head, neck, heart or legs?  2. NO - Do you ever have any pain or discomfort in your chest?  3. NO - Do you have a history of  Heart Failure?  4. NO - Are you troubled by shortness of breath when: walking on the level, up a slight hill or at night?  5. NO - Do you currently have a cold, bronchitis or other respiratory infection?  6. NO - Do you have a cough, shortness of breath or wheezing?  7. NO - Do you sometimes get pains in the calves of your legs when you walk?  8. NO - Do you or anyone in your family have previous history of blood clots?  9. NO - Do you or does anyone in your family have a serious bleeding problem such as prolonged bleeding following surgeries or cuts?  10. YES - HAVE YOU EVER HAD PROBLEMS WITH ANEMIA OR BEEN TOLD TO TAKE IRON PILLS? Yes thrombophilia   - followed by hematology - has a follow-up appointment - 2017.  11. NO - Have you had any abnormal blood loss such as black, tarry or bloody stools, or abnormal vaginal bleeding?  12. YES - HAVE YOU EVER  HAD A BLOOD TRANSFUSION? Yes - had a blood transfusion with her hysterectomy - 2004 or 2005.    13. NO - Have you or any of your relatives ever had problems with anesthesia?  14. NO - Do you have sleep apnea, excessive snoring or daytime drowsiness?  15. NO - Do you have any prosthetic heart valves?  16. NO - Do you have prosthetic joints?  17. NO - Is there any chance that you may be pregnant?    HPI:                                                      Brief HPI related to upcoming procedure - patient with right shoulder pain since May 2017. She was referred to physical therapy and saw ortho - Dr. Al.  She also had a cortisone injection.   Also tried a sling, ice and tylenol #3. She had a MRI on 7/6/2017 - which showed -  IMPRESSION:   1. 1.5 x 0.6 cm full-thickness or near full-thickness tear  anterolateral supraspinatus tendon, superimposed on prominent  degenerative tendinopathy.  2. Degenerative tendinopathy in the infraspinatus and subscapularis  tendons without tear.  3. Diffuse degeneration superior labrum.  4. Complete tear of the biceps tendon at the biceps anchor with distal  retraction.  5. Hypertrophic degenerative change at the acromioclavicular joint.     DORI SEXTON MD    Surgery was recommended but delayed due to abnormal blood counts - she saw hematology and underwent a bone marrow biopsy.  She states she last saw hematology in August and has blood work completed every 3 weeks.  States at her last blood draw - Dr. Ashley stated she would be okay for surgery.  She has a 6 month follow-up schedule with hematology on 12/21/2017.  Dx =  and anti-phospholipid antibody syndrome.  Recommended treatment was hydroxyrea 1000mg daily with dose monitoring of her CBC - hydroxyurea adjusted to maintain platelet count between 2000,000 and 500,000.  And she is taking 81mg baby aspirin daily.      HYPERTENSION - Patient has longstanding history of mod-severe HTN , currently denies any symptoms referable to  elevated blood pressure. Specifically denies chest pain, palpitations, dyspnea, orthopnea, PND or peripheral edema. Blood pressure readings have been in normal range. Current medication regimen is as listed below. Patient denies any side effects of medication.               BP Readings from Last 6 Encounters:   12/09/17 124/78   11/24/17 130/82   07/24/17 126/75   07/17/17 120/80   07/10/17 126/70   07/03/17 138/82                                                                                                                                                                                  .  DEPRESSION - Patient has a long history of Depression of moderate severity requiring medication for control with recent symptoms being stable..Current symptoms of depression include none.         PHQ-9 SCORE 4/9/2016 9/23/2016 5/23/2017   Total Score - - -   Total Score 1 0 0                                                                                                                                                                                .    MEDICAL HISTORY:                                                    Patient Active Problem List    Diagnosis Date Noted     Mild intermittent asthma without complication 11/17/2005     Priority: High     allergic - colognes       Other type of migraine 08/28/2003     Priority: High     Diagnosis updated by automated process. Provider to review and confirm.       Essential hypertension with goal blood pressure less than 140/90 06/16/2003     Priority: High     Thrombocytosis (H) 07/17/2017     Priority: Medium     Acute pain of left shoulder 06/05/2017     Priority: Medium     Impingement syndrome of left shoulder 06/05/2017     Priority: Medium     Simple chronic bronchitis (H) 02/02/2017     Priority: Medium     S/P gastric bypass 05/18/2016     Priority: Medium     Intestinal malabsorption- secondary to gastric bypass 10/15/2014     Priority: Medium     updating diagnosis code  for icd10 cutover       Blood in stool- has internal hemorrhoids, per [facility], does not need FIT 10/01/2013     Priority: Medium     Positive hemoccult       GERD (gastroesophageal reflux disease) 06/13/2012     Priority: Medium     Lupus anticoagulant (LAC) with hemorrhagic disorder- was on coumadin for 1 year, now off      Priority: Medium     on coumadin       Blood glucose abnormal      Priority: Medium     a`1c = 6.3 in 2009 prior to gastric bypass and wt loss        Advanced directives, counseling/discussion 11/22/2011     Priority: Medium     Advance Directive Problem List Overview:   Name Relationship Phone    Primary Health Care Agent            Alternative Health Care Agent          Discussed advance care planning with patient; however, patient declined at this time. 11/22/2011     Advance Care Planning 5/23/2017: ACP Review of Chart / Resources Provided:  Reviewed chart for advance care plan.  Stefany Vega has denied information and resources to begin or update their advance care plan.  Added by Taina Ly               Ischemic colitis 11/22/2011     Priority: Medium     CARDIOVASCULAR SCREENING; LDL GOAL LESS THAN 100 10/31/2010     Priority: Medium     Lobular Carcinoma in Situ of Breast-10/2009- - right breast 07/20/2010     Priority: Medium     Lipoma of Skin and Subcutaneous Tissue-left arm  07/20/2010     Priority: Medium     RECURR Depressive disorder -PART REM [296.35]      Priority: Medium     was on citalopram - worked well - now off meds        Non morbid obesity due to excess calories- prev. morbid obesity resolved s/p gastric bypass in 12/2009 06/16/2009     Priority: Medium     Sleep apnea 06/16/2009     Priority: Medium     Exophthalmia 11/28/2008     Priority: Medium     Iron deficiency anemia 05/08/2006     Priority: Medium     Problem list name updated by automated process. Provider to review       Edema 12/14/2005     Priority: Medium     left ankle        Open  wound of foot, excluding toe(s) 04/01/2005     Priority: Medium     Problem list name updated by automated process. Provider to review       TOBACCO USE DISORDER- quit again 6/2012- off and on still 2016 04/01/2005     Priority: Medium     Allergic state 06/16/2003     Priority: Medium     Symptomatic menopausal or female climacteric states 06/16/2003     Priority: Medium     Malignant neoplasm of left breast in female, estrogen receptor positive, unspecified site of breast (H) 06/16/2003     Priority: Medium     Problem list name updated by automated process. Provider to review        Past Medical History:   Diagnosis Date     Abnormal glucose     175 nonfasting - hgb a1c 2/3/06 = 6.3 prior to gastric bypass     Breast cancer (H) 1999    Dr Recio -estrogen receptor positive tumor      Depression     was on citalopram - worked well - now off meds      Edema     left ankle      Essential hypertension 1999    has been on diovan- no longer covered by insurance, lisinopril = dry cough, cozaar =terrible headaches        Gastro-oesophageal reflux disease      Ischemic colitis (H) 11/2011     Leukocytosis      Lupus anticoagulant disorder (H)     on coumadin- seeing Dr. Ashley-[facility]     Migraine     were menstrual , now a bit more cluster-like      Obesity, morbid (H)      JESICA (obstructive sleep apnea)     has not used CPAP since gastric bypass     Seasonal allergies     seasonal     Thrombocytosis (H)      Past Surgical History:   Procedure Laterality Date     BONE MARROW BIOPSY, BONE SPECIMEN, NEEDLE/TROCAR Right 7/24/2017    Procedure: BIOPSY BONE MARROW;  BONE MARROW ASPIRATE and BIOPSY Right Iliac Crest;  Surgeon: Nicolas Mcclellan MD;  Location: [facility]     Breast reconstruction Left 2/00     COLONOSCOPY  2010    fair prep - needs repeat in 2013 per gastro     ESOPHAGOSCOPY, GASTROSCOPY, DUODENOSCOPY (EGD), COMBINED  11/1/2013    Procedure: COMBINED ESOPHAGOSCOPY, GASTROSCOPY, DUODENOSCOPY (EGD), BIOPSY SINGLE OR  MULTIPLE;  ESOPHAGOSCOPY, GASTROSCOPY, DUODENOSCOPY (EGD) with bx;  Surgeon: Jas Lai, DO;  Location:  GI     ESOPHAGOSCOPY, GASTROSCOPY, DUODENOSCOPY (EGD), COMBINED N/A 5/20/2016    Procedure: COMBINED ESOPHAGOSCOPY, GASTROSCOPY, DUODENOSCOPY (EGD);  Surgeon: Junior Romano MD;  Location:  GI     EXCISE LESION EYELID Right 12/24/2014    Procedure: EXCISE LESION EYELID;  Surgeon: Albin Acevedo MD;  Location: [facility]     GASTRIC BYPASS  12/23/2009    Dr. Susie SHERIDANSD - s/p laparoscopic Richard-en-y 2004     HYSTERECTOMY, YIFAN  7/2005    s/p YIFAN/BSO/fibroid/endometriosis-precancer from tamoxifen      incisional hernia with SB infarct and resection  9/05     Left mastectomy  2/00     LUMPECTOMY BREAST Right 10/2009     -  lobular carcinoma in-situ     LUMPECTOMY BREAST x2 Left 2/00     RECONSTRUCT EYELID Right 12/24/2014    Procedure: RECONSTRUCT EYELID;  Surgeon: Albin Acevedo MD;  Location: [facility]     right achilles torn, tendon graft  2004     TONSILLECTOMY, ADENOIDECTOMY, COMBINED  1963     WEDGE RESECTION EYELID Right 9/12/2014    Procedure: WEDGE RESECTION EYELID;  Surgeon: Albin Acevedo MD;  Location[facility]     Current Outpatient Prescriptions   Medication Sig Dispense Refill     acetaminophen-codeine (TYLENOL WITH CODEINE #3) 300-30 MG per tablet Take 1-2 tablets by mouth every 4 hours as needed for mild pain 28 tablet 0     hydroxyurea (HYDREA) 500 MG capsule CHEMO Take by mouth daily       metoprolol (LOPRESSOR) 25 MG tablet TAKE HALF TABLET BY MOUTH TWICE DAILY 90 tablet 1     amLODIPine (NORVASC) 5 MG tablet Take 1 tablet (5 mg) by mouth every evening 90 tablet 1     hydrochlorothiazide (HYDRODIURIL) 25 MG tablet TAKE 1 TABLET BY MOUTH EVERY DAY IN THE MORNING 90 tablet 1     fluticasone (FLONASE) 50 MCG/ACT spray Spray 1-2 sprays into both nostrils daily as needed for rhinitis or allergies       Metoprolol Tartrate (LOPRESSOR PO) Take 25 mg by mouth every morning        sucralfate (CARAFATE) 1 GM tablet Take 1 g by mouth 4 times daily as needed       ANORO ELLIPTA 62.5-25 MCG/INH oral inhaler Inhale 1 puff into the lungs daily   11     buPROPion (ZYBAN) 150 MG 12 hr tablet Take 150 mg by mouth 2 times daily   5     omeprazole (PRILOSEC) 40 MG capsule TAKE ONE CAPSULE BY MOUTH TWICE DAILY 180 capsule 3     mometasone (ELOCON) 0.1 % cream APPLY TOPICALLY TO HANDS FOR UP TO 14 DAYS AT A TIME  (Patient not taking: Reported on 11/24/2017) 45 g 1     glucosamine-chondroitin 500-400 MG CAPS Take 1 capsule by mouth At Bedtime       potassium 99 MG TABS Take 1 tablet by mouth daily       cyanocolbalamin (VITAMIN  B-12) 500 MCG tablet Take 500 mcg by mouth daily       Vitamin D, Cholecalciferol, 1000 UNITS TABS Take 1 tablet by mouth daily       cetirizine (ZYRTEC) 10 MG tablet Take 10 mg by mouth daily        ferrous sulfate (SLO-FE) 142 (45 FE) MG TBCR Take 142 mg by mouth every evening  30 tablet      acyclovir (ZOVIRAX) 800 MG tablet Take one tablet by mouth three times daily  for 2 days (Patient not taking: Reported on 11/24/2017) 18 tablet 12     calcium-vitamin D (CALCIUM 600 + D) 600-400 MG-UNIT per tablet Take 1 tablet by mouth 2 times daily 60 tablet      OTC products: Aspirin and NSAIDs.     Allergies   Allergen Reactions     Amoxicillin Anaphylaxis     Penicillins Anaphylaxis and Swelling     Passed out with tongue swelling in 1980's , but has had augmentin in 2013 without any problems.      Chantix [Varenicline]      Bad nightmares      Estrogens      Can't be on secondary to HX of estrogen receptor positive breast cancer      Ibuprofen Rash     Lisinopril Cough      Latex Allergy: NO    Social History   Substance Use Topics     Smoking status: Former Smoker     Packs/day: 0.30     Years: 40.00     Types: Cigarettes     Quit date: 6/5/2012     Smokeless tobacco: Never Used      Comment: started again 8/05 - strongly encourage cessation with every visit - needs to quit for weight  "loss surgery - noted 7/09      Alcohol use 0.0 oz/week     0 Standard drinks or equivalent per week      Comment: occ     History   Drug Use No       REVIEW OF SYSTEMS:                                                    C: NEGATIVE for fever, chills, change in weight  E/M: NEGATIVE for ear, mouth and throat problems  R: NEGATIVE for significant cough or SOB  CV: NEGATIVE for chest pain, palpitations or peripheral edema    EXAM:                                                    /78 (BP Location: Right arm, Patient Position: Sitting, Cuff Size: Adult Large)  Pulse 61  Temp 98  F (36.7  C) (Oral)  Ht 5' 5.9\" (1.674 m)  Wt 192 lb 14.4 oz (87.5 kg)  LMP 09/21/2004  SpO2 97%  BMI 31.23 kg/m2  GENERAL APPEARANCE: healthy, alert and no distress  HENT: ear canals and TM's normal and nose and mouth without ulcers or lesions  RESP: lungs clear to auscultation - no rales, rhonchi or wheezes  CV: regular rate and rhythm, normal S1 S2, no S3 or S4 and no murmur, click or rub   ABDOMEN: soft, nontender, no HSM or masses and bowel sounds normal  NEURO: Normal strength and tone, sensory exam grossly normal, mentation intact and speech normal    DIAGNOSTICS:                                                    EKG: sinus bradycardia, normal axis, normal intervals, no acute ST/T changes c/w ischemia, no LVH by voltage criteria, unchanged from previous tracings    Recent Labs   Lab Test  07/24/17   0828  07/17/17   1542   09/23/16   0840   10/23/15   0837   09/09/14   0903   08/09/12   1131   HGB  16.1*  16.5*   < >  17.2*   < >   --    < >  14.8   < >   --    PLT  957*  1067*   < >  743*   < >   --    < >  477*   < >   --    INR   --    --    --    --    --    --    --   1.00   --   1.96*   NA   --   134   --   135   < >  134   < >  138   < >   --    POTASSIUM   --   4.6   --   4.2   < >  4.0   < >  3.7   < >   --    CR   --   0.76   --   0.65   < >  0.69   < >  0.73   < >   --    A1C   --    --    --   5.6   --   5.3   -- "    --    < >   --     < > = values in this interval not displayed.   CBC RESULTS:   Recent Labs   Lab Test  12/09/17   0841   WBC  7.2   RBC  4.31   HGB  15.0   HCT  42.9   MCV  100   MCH  34.8*   MCHC  35.0   RDW  12.1   PLT  453*     Last Basic Metabolic Panel:  Lab Results   Component Value Date     12/09/2017      Lab Results   Component Value Date    POTASSIUM 3.4 12/09/2017     Lab Results   Component Value Date    CHLORIDE 96 12/09/2017     Lab Results   Component Value Date    BEV 9.1 12/09/2017     Lab Results   Component Value Date    CO2 31 12/09/2017     Lab Results   Component Value Date    BUN 19 12/09/2017     Lab Results   Component Value Date    CR 0.75 12/09/2017     Lab Results   Component Value Date    GLC 82 12/09/2017       GFR Estimate   Date Value Ref Range Status   12/09/2017 79 >60 mL/min/1.7m2 Final     Comment:     Non  GFR Calc   07/17/2017 78 >60 mL/min/1.7m2 Final     Comment:     Non  GFR Calc   09/23/2016 >90  Non  GFR Calc   >60 mL/min/1.7m2 Final         IMPRESSION:                                                    Reason for surgery/procedure:  - left rotator cuff  Diagnosis/reason for consult:  -  left shoulder orthoscopy, decompression, distal clavicle resection, rotator cuff repair.         The proposed surgical procedure is considered INTERMEDIATE risk.    REVISED CARDIAC RISK INDEX  The patient has the following serious cardiovascular risks for perioperative complications such as (MI, PE, VFib and 3  AV Block):  No serious cardiac risks  INTERPRETATION: 0 risks: Class I (very low risk - 0.4% complication rate)    The patient has the following additional risks for perioperative complications:  No identified additional risks    RECOMMENDATIONS:                                                        ICD-10-CM    1. Preop general physical exam Z01.818 Albumin Random Urine Quantitative with Creat Ratio     EKG 12-lead complete  w/read - Clinics     CBC with platelets differential     Comprehensive metabolic panel   2. Supraspinatus tendon tear, left, initial encounter S46.812A acetaminophen-codeine (TYLENOL WITH CODEINE #3) 300-30 MG per tablet   3. Labral tear of long head of biceps tendon, left, initial encounter S46.112A acetaminophen-codeine (TYLENOL WITH CODEINE #3) 300-30 MG per tablet   4. Essential thrombocythemia (H) D47.3 CBC with platelets differential     aspirin 81 MG tablet   5. Essential hypertension with goal blood pressure less than 140/90 I10 Albumin Random Urine Quantitative with Creat Ratio     CBC with platelets differential     Comprehensive metabolic panel   6. Iron deficiency anemia, unspecified iron deficiency anemia type D50.9 CBC with platelets differential   7. Impingement syndrome of left shoulder M75.42 acetaminophen-codeine (TYLENOL WITH CODEINE #3) 300-30 MG per tablet          --Patient is to take all scheduled medications on the day of surgery EXCEPT for modifications listed below.  Stop aspirin and NSAIDs 7 to 10 days before surgery.    APPROVAL GIVEN to proceed with proposed procedure, without further diagnostic evaluation with approval from hematology as well. With platelet count near normal at this time. Suspect this will be fine.        Signed Electronically by:        ISAEL Cheng CNP    I have reviewed today's vital signs, medications, labs and H &P.  I have updated pt's labs and diagnoses and  I agree with Carolee Lujan CNP's assessment and plan as above.        Lisa Mcguire MD       Copy of this evaluation report is provided to requesting physician.    [facility] Preop Guidelines

## 2017-12-09 NOTE — MR AVS SNAPSHOT
After Visit Summary   12/9/2017    Stefany Vega    MRN: [id number]           Patient Information     Date Of Birth          1955        Visit Information        Provider Department      12/9/2017 8:00 AM Carolee Lujan APRN [facility]        Today's Diagnoses     Preop general physical exam    -  1    Supraspinatus tendon tear, left, initial encounter        Labral tear of long head of biceps tendon, left, initial encounter        Essential thrombocythemia (H)        Essential hypertension with goal blood pressure less than 140/90        Iron deficiency anemia, unspecified iron deficiency anemia type        Impingement syndrome of left shoulder          Care Instructions      Before Your Surgery      Call your surgeon if there is any change in your health. This includes signs of a cold or flu (such as a sore throat, runny nose, cough, rash or fever).    Do not smoke, drink alcohol or take over the counter medicine (unless your surgeon or primary care doctor tells you to) for the 24 hours before and after surgery.    If you take prescribed drugs: Follow your doctor s orders about which medicines to take and which to stop until after surgery.    Eating and drinking prior to surgery: follow the instructions from your surgeon    Take a shower or bath the night before surgery. Use the soap your surgeon gave you to gently clean your skin. If you do not have soap from your surgeon, use your regular soap. Do not shave or scrub the surgery site.  Wear clean pajamas and have clean sheets on your bed.           Follow-ups after your visit        Your next 10 appointments already scheduled     Dec 20, 2017  7:30 AM CST   (Arrive by 7:15 AM)   OPAL Extremity with Neto Hilliard PT   [facility] [place] ([facility] [place])    [address]  [place] [address]   [phone]            Dec 22, 2017  8:15 AM CST   PHYSICAL with Lisa Mcguire MD  "  [facility]    [address] [address] [phone]            Jan 02, 2018  9:40 AM CST   Return Visit with Corky Fuentes PA-C   [facility] ([facility])    [address]  [address]  [facility] [address]   [phone]              Who to contact     If you have questions or need follow up information about today's clinic visit or your schedule please contact [facility] directly at [phone].  Normal or non-critical lab and imaging results will be communicated to you by MyChart, letter or phone within 4 business days after the clinic has received the results. If you do not hear from us within 7 days, please contact the clinic through [employer] or phone. If you have a critical or abnormal lab result, we will notify you by phone as soon as possible.  Submit refill requests through [employer] or call your pharmacy and they will forward the refill request to us. Please allow 3 business days for your refill to be completed.          Additional Information About Your Visit        [employer] Information     [facility] gives you secure access to your electronic health record. If you see a primary care provider, you can also send messages to your care team and make appointments. If you have questions, please call your primary care clinic.  If you do not have a primary care provider, please call [phone] and they will assist you.        Care EveryWhere ID     This is your Care EveryWhere ID. This could be used by other organizations to access your [facility] medical records  [id number]        Your Vitals Were     Pulse Temperature Height Last Period Pulse Oximetry BMI (Body Mass Index)    61 98  F (36.7  C) (Oral) 5' 5.9\" (1.674 m) 09/21/2004 97% 31.23 kg/m2       Blood Pressure from Last 3 Encounters:   12/09/17 124/78   11/24/17 130/82   07/24/17 126/75    Weight from Last " 3 Encounters:   12/09/17 192 lb 14.4 oz (87.5 kg)   11/24/17 195 lb (88.5 kg)   07/24/17 193 lb (87.5 kg)              We Performed the Following     Albumin Random Urine Quantitative with Creat Ratio     CBC with platelets differential     Comprehensive metabolic panel     EKG 12-lead complete w/read - Clinics          Today's Medication Changes          These changes are accurate as of: 12/9/17 11:59 PM.  If you have any questions, ask your nurse or doctor.               These medicines have changed or have updated prescriptions.        Dose/Directions    LOPRESSOR PO   This may have changed:  Another medication with the same name was removed. Continue taking this medication, and follow the directions you see here.        Dose:  25 mg   Take 25 mg by mouth every morning   Refills:  0            Where to get your medicines      Some of these will need a paper prescription and others can be bought over the counter.  Ask your nurse if you have questions.     Bring a paper prescription for each of these medications     acetaminophen-codeine 300-30 MG per tablet                Primary Care Provider Office Phone # Fax #    Lisa Mcguire [phone]       [address] [place] [address]        Equal Access to Services     [facility] CampusSTACIE : Hadii august kay Sojosi, waaxda luqadaha, qaybta kaalmada rachael, debbie arriaza . So [facility] [phone].    ATENCIÓN: Si habla español, tiene a kenny disposición servicios gratuitos de asistencia lingüística. Llame al [phone].    We comply with applicable federal civil rights laws and [place] laws. We do not discriminate on the basis of race, color, national origin, age, disability, sex, sexual orientation, or gender identity.            Thank you!     Thank you for choosing [facility]  for your care. Our goal is always to provide you with excellent care. Hearing back from our patients is one way  we can continue to improve our services. Please take a few minutes to complete the written survey that you may receive in the mail after your visit with us. Thank you!             Your Updated Medication List - Protect others around you: Learn how to safely use, store and throw away your medicines at www.disposemymeds.org.          This list is accurate as of: 12/9/17 11:59 PM.  Always use your most recent med list.                   Brand Name Dispense Instructions for use Diagnosis    acetaminophen-codeine 300-30 MG per tablet    TYLENOL WITH CODEINE #3    28 tablet    Take 1-2 tablets by mouth every 4 hours as needed for mild pain    Impingement syndrome of left shoulder, Supraspinatus tendon tear, left, initial encounter, Labral tear of long head of biceps tendon, left, initial encounter       acyclovir 800 MG tablet    ZOVIRAX    18 tablet    Take one tablet by mouth three times daily  for 2 days    HSV (herpes simplex virus) infection       amLODIPine 5 MG tablet    NORVASC    90 tablet    Take 1 tablet (5 mg) by mouth every evening    Essential hypertension with goal blood pressure less than 140/90       ANORO ELLIPTA 62.5-25 MCG/INH oral inhaler   Generic drug:  umeclidinium-vilanterol      Inhale 1 puff into the lungs daily        aspirin 81 MG tablet     30 tablet    Take 1 tablet (81 mg) by mouth daily    Essential thrombocythemia (H)       buPROPion 150 MG 12 hr tablet    ZYBAN     Take 150 mg by mouth 2 times daily        calcium 600 + D 600-400 MG-UNIT per tablet   Generic drug:  calcium-vitamin D     60 tablet    Take 1 tablet by mouth 2 times daily        cetirizine 10 MG tablet    zyrTEC     Take 10 mg by mouth daily        cyanocolbalamin 500 MCG tablet    vitamin  B-12     Take 500 mcg by mouth daily        fluticasone 50 MCG/ACT spray    FLONASE     Spray 1-2 sprays into both nostrils daily as needed for rhinitis or allergies        glucosamine-chondroitin 500-400 MG Caps per capsule      Take 1  capsule by mouth At Bedtime        hydrochlorothiazide 25 MG tablet    HYDRODIURIL    90 tablet    TAKE 1 TABLET BY MOUTH EVERY DAY IN THE MORNING    Essential hypertension with goal blood pressure less than 140/90       hydroxyurea 500 MG capsule CHEMO    HYDREA     Take by mouth daily        LOPRESSOR PO      Take 25 mg by mouth every morning        mometasone 0.1 % cream    ELOCON    45 g    APPLY TOPICALLY TO HANDS FOR UP TO 14 DAYS AT A TIME    Dermatitis       omeprazole 40 MG capsule    priLOSEC    180 capsule    TAKE ONE CAPSULE BY MOUTH TWICE DAILY    GERD (gastroesophageal reflux disease)       potassium 99 MG Tabs      Take 1 tablet by mouth daily        sucralfate 1 GM tablet    CARAFATE     Take 1 g by mouth 4 times daily as needed        Vitamin D (Cholecalciferol) 1000 UNITS Tabs      Take 1 tablet by mouth daily

## 2017-12-11 NOTE — TELEPHONE ENCOUNTER
Also see result note from Saturday 12/8/2017 - CBC with diff.        Please fax results and also call hematology -  [facility] - Dr. Ashley - she is followed by and treated by hematology for essential thrombocythemia - she is scheduled for left shoulder rotator cuff surgery on 12/27/17 - last visit note that we have is from August 2017 - want to make that they agree that her thrombocytosis is adequately control/it is okay to proceed with surgery from a hematological standpoint.  And that they do not have any pre-op and post-op recommendations based on her essential thrombocythemia diagnosis.     Thanks you    Carolee

## 2017-12-11 NOTE — TELEPHONE ENCOUNTER
Results faxed to Hematology @ [phone]  Called Dr. Ashley's office @ [phone] - left message with note below.     Awaiting call back.     Fara Ortega RN  [facility]

## 2017-12-11 NOTE — LETTER
[facility]  [address] [place] [id number] [phone]    December 11, 2017    Stefany Vega  [address] [address]      To Whom it May Concern:    The above named patient is followed by and treated by hematology (Dr. Ashley) for essential thrombocythemia - she is scheduled for left shoulder rotator cuff surgery on 12/27/17 - last visit note that we have is from August 2017 - want to make that they agree that her thrombocytosis is adequately control/it is okay to proceed with surgery from a hematological standpoint.  And that they do not have any pre-op and post-op recommendations based on her essential thrombocythemia diagnosis.     Please call the clinic at [phone] or fax your response to [phone].      Thanks you for your time.       Sincerely,    Carolee Lujan NP

## 2017-12-18 NOTE — TELEPHONE ENCOUNTER
Called Dr. Ashley's Office - spoke with RN.   States she did not receive any fax. Requesting results be refaxed to [phone] - notes re-faxed.     Rn will call clinic back once Dr. Ashley reviews notes.     Fara Ortega RN  [place] [facility]

## 2017-12-18 NOTE — TELEPHONE ENCOUNTER
Patient notified by phone.    She said she was advised to stop ASA 5 days prior to surgery by her surgeon.  SW had advised in her pre-op to stop ASA 7 to 10 days prior to surgery but patient doesn't remember being told that.    She wants to clarify that she is supposed to continue Aspirin or stop 5 days prior to surgery?      JV said to hold 5 days per surgeon and continue with ASA after surgery.  JV advised platelets are now normal.  JV advised to call Dr. Ashley's office to clarify.  Patient has thrombocythemia and last platelet count was normal.      RN called Dr. Ashley's office at [phone] and left message for RN to call back.      WIL Sheppard, RN, N  [facility]) [phone]

## 2017-12-18 NOTE — TELEPHONE ENCOUNTER
[facility] returning call    Dr. Ashley reviewed records that were sent - platelet count has been well controlled . Should be fine for shoulder surgery  Would like patient to continue on low dose aspirin due to hx of lupus inhibitor and increased risk of thrombosis due to dx of essential thrombocytopenia     Routing to MICHAEL, NP    Attempt #1 to inform patient of Dr. Ashley's message  Called patient @ [phone] - Left a non-detailed message to call back and speak with any triage nurse.    Fara Ortega RN  [facility]

## 2017-12-19 NOTE — TELEPHONE ENCOUNTER
MD SAULO off today.  Reviewed previous phone notes, surgeons recommendations and oncologist recommendations.  Discussed with IRIS Leger.  Would recommend following surgeons recommendation of holding ASA 5 days before surgery, and the resuming afterwards.  Please let patient know.      ISAEL Cheng, CNP

## 2017-12-19 NOTE — TELEPHONE ENCOUNTER
Sierra Tapia from Oncology calling  ( Dr. jones's office)  - pt would be fine taking the Asprin right up until surgery however it is up the surgeon. The surgeon knows how much bleeding with occur during the surgery.     She she be on the Asprin due to the increase risk for thrombosis with pts history.     Please advise     Thank you     Africa Maddox RN, BSN  [facility]

## 2017-12-19 NOTE — TELEPHONE ENCOUNTER
Called # [phone] ([place])     Advised pt on the information below     Patient stated an understanding and agreed with plan.    Africa Maddox RN, BSN  [place] [facility]     ,

## 2017-12-20 PROBLEM — Z47.89 AFTERCARE FOLLOWING SURGERY OF THE MUSCULOSKELETAL SYSTEM: Status: ACTIVE | Noted: 2017-01-01

## 2017-12-20 PROBLEM — M25.512 LEFT SHOULDER PAIN, UNSPECIFIED CHRONICITY: Status: ACTIVE | Noted: 2017-01-01

## 2017-12-20 PROBLEM — M25.512 ACUTE PAIN OF LEFT SHOULDER: Status: RESOLVED | Noted: 2017-01-01 | Resolved: 2017-01-01

## 2017-12-20 PROBLEM — M75.42 IMPINGEMENT SYNDROME OF LEFT SHOULDER: Status: RESOLVED | Noted: 2017-01-01 | Resolved: 2017-01-01

## 2017-12-20 NOTE — PROGRESS NOTES
[facility] Evaluation  Subjective:    Patient is a 62 year old female presenting with rehab left shoulder hpi. The history is provided by the patient. No  was used.   Stefany Vega is a 62 year old female with a left shoulder condition.  Condition occurred with:  Unknown cause (L shoulder pain, pain is progressing with limiting her function.  Pt is elected to have L shoulder surgery next week.  Plan for RCR, decompression, biceps possible. ).  Condition occurred: for unknown reasons.  This is a new condition  12/2017.    Patient reports pain:  Anterior and lateral.    Pain is described as aching and sharp and is intermittent and reported as 3/10 and 9/10.  Associated symptoms:  Loss of strength and loss of motion/stiffness. Pain is worse during the night.  Symptoms are exacerbated by certain positions, using arm overhead and lifting and relieved by rest.  Since onset symptoms are unchanged.  Special tests:  MRI.  Previous treatment includes physical therapy.  There was no improvement following previous treatment.  General health as reported by patient is excellent.  Pertinent medical history includes:  High blood pressure, cancer, overweight, anemia, migraines and smoking.  Medical allergies: yes.  Other surgeries include:  Orthopedic surgery and cancer surgery.  Current medications:  Anti-depressants and other.  Current occupation is   Will take some time off work  .  Patient is currently not working due to present treatment problem.  Primary job tasks include:  Prolonged sitting.    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    Red flags:  Pain at rest/night.                        Objective:    System                   Shoulder Evaluation:  ROM:    PROM:  normal (pre-op PROM is good)                            Pain: pt was insttucted in HEP she can do starting week 1 after surgery    Strength:  not  assessed                                                               General     ROS    Assessment/Plan:      Patient is a 62 year old female with left side shoulder complaints.    Patient has the following significant findings with corresponding treatment plan.                Diagnosis 1:  L shoulder pre-op and post-op L RCR  Pain -  hot/cold therapy, self management, education and home program  Decreased ROM/flexibility - manual therapy and therapeutic exercise  Decreased strength - therapeutic exercise and therapeutic activities  Inflammation - cold therapy  Decreased function - therapeutic activities  Impaired posture - neuro re-education    Therapy Evaluation Codes:   1) History comprised of:   Personal factors that impact the plan of care:      None.    Comorbidity factors that impact the plan of care are:      None.     Medications impacting care: None.  2) Examination of Body Systems comprised of:   Body structures and functions that impact the plan of care:      Shoulder.   Activity limitations that impact the plan of care are:      Cooking, Driving, Dressing and Lifting.  3) Clinical presentation characteristics are:   Stable/Uncomplicated.  4) Decision-Making    Low complexity using standardized patient assessment instrument and/or measureable assessment of functional outcome.  Cumulative Therapy Evaluation is: Low complexity.    Previous and current functional limitations:  (See Goal Flow Sheet for this information)    Short term and Long term goals: (See Goal Flow Sheet for this information)     Communication ability:  Patient appears to be able to clearly communicate and understand verbal and written communication and follow directions correctly.  Treatment Explanation - The following has been discussed with the patient:   RX ordered/plan of care  Anticipated outcomes  Possible risks and side effects  This patient would benefit from PT intervention to resume normal activities.   Rehab potential is  good.    Frequency:  1 X week, once daily  Duration:  for 5 weeks tapering to 2 X a  Month over 8 weeks  (pt will start with PT s/p at within week 2-4s/p)  Discharge Plan:  Achieve all LTG.  Independent in home treatment program.  Reach maximal therapeutic benefit.    Please refer to the daily flowsheet for treatment today, total treatment time and time spent performing 1:1 timed codes.

## 2017-12-20 NOTE — MR AVS SNAPSHOT
After Visit Summary   12/20/2017    Stefany Vega    MRN: [id number]           Patient Information     Date Of Birth          1955        Visit Information        Provider Department      12/20/2017 7:30 AM Neto Hilliard PT [facility]        Today's Diagnoses     Left shoulder pain, unspecified chronicity    -  1    Aftercare following surgery of the musculoskeletal system           Follow-ups after your visit        Your next 10 appointments already scheduled     Dec 22, 2017  8:15 AM CST   PHYSICAL with Lisa Mcguire MD   [facility]    [address] [place] [address]   [phone]            Jan 02, 2018  9:40 AM CST   Return Visit with Corky Fuentes PA-C   [facility] ([facility])    [address]  [facility] [address]   [phone]              Who to contact     If you have questions or need follow up information about today's clinic visit or your schedule please contact [facility] directly at [phone].  Normal or non-critical lab and imaging results will be communicated to you by MyChart, letter or phone within 4 business days after the clinic has received the results. If you do not hear from us within 7 days, please contact the clinic through [employer] or phone. If you have a critical or abnormal lab result, we will notify you by phone as soon as possible.  Submit refill requests through Heap or call your pharmacy and they will forward the refill request to us. Please allow 3 business days for your refill to be completed.          Additional Information About Your Visit        MyChart Information     Heap gives you secure access to your electronic health record. If you see a primary care provider, you can also send messages to your care team and make appointments. If  you have questions, please call your primary care clinic.  If you do not have a primary care provider, please call [phone] and they will assist you.        Care EveryWhere ID     This is your Care EveryWhere ID. This could be used by other organizations to access your [facility] medical records  [id number]        Your Vitals Were     Last Period                   09/21/2004            Blood Pressure from Last 3 Encounters:   12/09/17 124/78   11/24/17 130/82   07/24/17 126/75    Weight from Last 3 Encounters:   12/09/17 87.5 kg (192 lb 14.4 oz)   11/24/17 88.5 kg (195 lb)   07/24/17 87.5 kg (193 lb)              We Performed the Following     HC PT EVAL, LOW COMPLEXITY     OPAL INITIAL EVAL REPORT     THERAPEUTIC EXERCISES        Primary Care Provider Office Phone # Fax #    Lisa Mcguire [phone]       [address] [place] [address]        Equal Access to Services     SHARITA PEREZ : Hadii aad ku hadasho Soomaali, waaxda luqadaha, qaybta kaalmada adeegyada, waxay idiin hayaan ingrideg [place] arriaza . So [facility] [phone].    ATENCIÓN: Si habla español, tiene a kenny disposición servicios gratuitos de asistencia lingüística. Llame al [phone].    We comply with applicable federal civil rights laws and [place] laws. We do not discriminate on the basis of race, color, national origin, age, disability, sex, sexual orientation, or gender identity.            Thank you!     Thank you for choosing [facility]  for your care. Our goal is always to provide you with excellent care. Hearing back from our patients is one way we can continue to improve our services. Please take a few minutes to complete the written survey that you may receive in the mail after your visit with us. Thank you!             Your Updated Medication List - Protect others around you: Learn how to safely use, store and throw away your medicines at www.disposemymeds.org.           This list is accurate as of: 12/20/17 12:41 PM.  Always use your most recent med list.                   Brand Name Dispense Instructions for use Diagnosis    acetaminophen-codeine 300-30 MG per tablet    TYLENOL WITH CODEINE #3    28 tablet    Take 1-2 tablets by mouth every 4 hours as needed for mild pain    Impingement syndrome of left shoulder, Supraspinatus tendon tear, left, initial encounter, Labral tear of long head of biceps tendon, left, initial encounter       acyclovir 800 MG tablet    ZOVIRAX    18 tablet    Take one tablet by mouth three times daily  for 2 days    HSV (herpes simplex virus) infection       amLODIPine 5 MG tablet    NORVASC    90 tablet    Take 1 tablet (5 mg) by mouth every evening    Essential hypertension with goal blood pressure less than 140/90       ANORO ELLIPTA 62.5-25 MCG/INH oral inhaler   Generic drug:  umeclidinium-vilanterol      Inhale 1 puff into the lungs daily        aspirin 81 MG tablet     30 tablet    Take 1 tablet (81 mg) by mouth daily    Essential thrombocythemia (H)       buPROPion 150 MG 12 hr tablet    ZYBAN     Take 150 mg by mouth 2 times daily        calcium 600 + D 600-400 MG-UNIT per tablet   Generic drug:  calcium-vitamin D     60 tablet    Take 1 tablet by mouth 2 times daily        cetirizine 10 MG tablet    zyrTEC     Take 10 mg by mouth daily        cyanocolbalamin 500 MCG tablet    vitamin  B-12     Take 500 mcg by mouth daily        fluticasone 50 MCG/ACT spray    FLONASE     Spray 1-2 sprays into both nostrils daily as needed for rhinitis or allergies        glucosamine-chondroitin 500-400 MG Caps per capsule      Take 1 capsule by mouth At Bedtime        hydrochlorothiazide 25 MG tablet    HYDRODIURIL    90 tablet    TAKE 1 TABLET BY MOUTH EVERY DAY IN THE MORNING    Essential hypertension with goal blood pressure less than 140/90       hydroxyurea 500 MG capsule CHEMO    HYDREA     Take by mouth daily        LOPRESSOR PO      Take 25 mg by  mouth every morning        mometasone 0.1 % cream    ELOCON    45 g    APPLY TOPICALLY TO HANDS FOR UP TO 14 DAYS AT A TIME    Dermatitis       omeprazole 40 MG capsule    priLOSEC    180 capsule    TAKE ONE CAPSULE BY MOUTH TWICE DAILY    GERD (gastroesophageal reflux disease)       potassium 99 MG Tabs      Take 1 tablet by mouth daily        sucralfate 1 GM tablet    CARAFATE     Take 1 g by mouth 4 times daily as needed        Vitamin D (Cholecalciferol) 1000 UNITS Tabs      Take 1 tablet by mouth daily

## 2017-12-21 NOTE — PATIENT INSTRUCTIONS
"Establish an exercise regimen. Activity goal: 45 minutes 5 days a week. New exercise routine: cardiovascular workout on exercise equipment, walking and weightlifting. Diet regimen was discussed and plan is self-directed dieting: reduce calories, reduce portions, reduce processed  carbs, increase fruits/vegetables and avoid sweets and supervised diet program. Avoid artificial sweeteners and \"diet\" drinks and sodas.       Start walking for exercise - If walking outside,   - rain or shine - walk a block, then come home, next day walk   2 blocks , then come home ; and then add a block further from home daily - work up to 30-45minutes daily or 3-4x/week - or can work  up to  3-4 miles briskly daily.       If walking on treadmill or at  the mall, start with 5 minutes first day, then 6 minutes next day , then 7 minutes next day, then 8 minutes next day, etc.   Gradually work up to the above goals.  No stopping.      Preventive Health Recommendations  Female Ages 50 - 64    Yearly exam: See your health care provider every year in order to  o Review health changes.   o Discuss preventive care.    o Review your medicines if your doctor has prescribed any.      Get a Pap test every three years (unless you have an abnormal result and your provider advises testing more often).    If you get Pap tests with HPV test, you only need to test every 5 years, unless you have an abnormal result.     You do not need a Pap test if your uterus was removed (hysterectomy) and you have not had cancer.    You should be tested each year for STDs (sexually transmitted diseases) if you're at risk.     Have a mammogram every 1 to 2 years.    Have a colonoscopy at age 50, or have a yearly FIT test (stool test). These exams screen for colon cancer.      Have a cholesterol test every 5 years, or more often if advised.    Have a diabetes test (fasting glucose) every three years. If you are at risk for diabetes, you should have this test more often. "     If you are at risk for osteoporosis (brittle bone disease), think about having a bone density scan (DEXA).    Shots: Get a flu shot each year. Get a tetanus shot every 10 years.    Nutrition:     Eat at least 5 servings of fruits and vegetables each day.    Eat whole-grain bread, whole-wheat pasta and brown rice instead of white grains and rice.    Talk to your provider about Calcium and Vitamin D.     Lifestyle    Exercise at least 150 minutes a week (30 minutes a day, 5 days a week). This will help you control your weight and prevent disease.    Limit alcohol to one drink per day.    No smoking.     Wear sunscreen to prevent skin cancer.     See your dentist every six months for an exam and cleaning.    See your eye doctor every 1 to 2 years.      Recheck with me again in 6 months or sooner, if needed.              Thank you for choosing [facility]  for your Health Care. It was a pleasure seeing you at your visit today. Please contact us with any questions or concerns you may have.                   Lisa Mcguire MD                                  To reach your [facility] care team after hours call:   [phone]    Our clinic hours are:     Monday- 7:30 am - 7:00 pm                             Tuesday through Friday- 7:30 am - 5:00 pm                                        Saturday- 8:00 am - 12:00 pm                  Phone:  [phone]    Our pharmacy hours are:     Monday  8:00 am to 7:00 pm      Tuesday through Friday 8:00am to 6:00pm                        Saturday - 9:00 am to 1:00 pm      Sunday : Closed.              Phone:  [phone]      There is also information available at our web site:  [email or web address]    If your provider ordered any lab tests and you do not receive the results within 10 business days, please call the clinic.    If you need a medication refill please contact your pharmacy.  Please allow 2 business days for your refill to be  completed.    Our clinic offers telephone visits and e visits.  Please ask one of your team members to explain more.      Use MyChart (secure email communication and access to your chart) to send your primary care provider a message or make an appointment. Ask someone on your Team how to sign up for [employer].

## 2017-12-21 NOTE — PROGRESS NOTES
SUBJECTIVE:   CC: Stefany Vega is an 62 year old woman who presents for preventive health visit.     Healthy Habits:    Do you get at least three servings of calcium containing foods daily (dairy, green leafy vegetables, etc.)? yes    Amount of exercise or daily activities, outside of work: always moving    Problems taking medications regularly No    Medication side effects: No    Have you had an eye exam in the past two years? yes    Do you see a dentist twice per year? yes    Do you have sleep apnea, excessive snoring or daytime drowsiness?no      Hypertension Follow-up      Outpatient blood pressures are not being checked.    Low Salt Diet: not monitoring salt    BP Readings from Last 5 Encounters:   12/22/17 126/78   12/09/17 124/78   11/24/17 130/82   07/24/17 126/75   07/17/17 120/80       Depression Followup    Status since last visit: Stable - taking zyban to quit smoking    See PHQ-9 for current symptoms.  Other associated symptoms: None    Complicating factors:   Significant life event:  No   Current substance abuse:  None  Anxiety or Panic symptoms:  No    PHQ-9 Score and MyChart F/U Questions 5/23/2017 12/11/2017 12/22/2017   Total Score 0 0 1   Q9: Suicide Ideation Not at all Not at all Not at all       PHQ-9  English  PHQ-9   Any Language  Suicide Assessment Five-step Evaluation and Treatment (SAFE-T)      Asthma Follow-Up    Was ACT completed today?    Yes    ACT Total Scores 5/23/2017   ACT TOTAL SCORE -   ASTHMA ER VISITS -   ASTHMA HOSPITALIZATIONS -   ACT TOTAL SCORE (Goal Greater than or Equal to 20) 24   In the past 12 months, how many times did you visit the emergency room for your asthma without being admitted to the hospital? 0   In the past 12 months, how many times were you hospitalized overnight because of your asthma? 0       Recent asthma triggers that patient is dealing with: None        Today's PHQ-2 Score:   PHQ-2 ( 1999 [employer]) 12/22/2017 5/23/2017   Q1: Little interest or  pleasure in doing things 0 0   Q2: Feeling down, depressed or hopeless 0 0   PHQ-2 Score 0 0       Abuse: Current or Past(Physical, Sexual or Emotional)- No  Do you feel safe in your environment - Yes      Social History   Substance Use Topics     Smoking status: Current Every Day Smoker     Packs/day: 0.30     Years: 40.00     Types: Cigarettes     Last attempt to quit: 6/5/2012     Smokeless tobacco: Never Used      Comment: started again 8/05 - strongly encourage cessation with every visit - needs to quit for weight loss surgery - noted 7/09      Alcohol use 0.0 oz/week     0 Standard drinks or equivalent per week      Comment: 0-2 per week     If you drink alcohol do you typically have >3 drinks per day or >7 drinks per week? No                     Reviewed orders with patient.  Reviewed health maintenance and updated orders accordingly - Yes  BP Readings from Last 3 Encounters:   12/22/17 126/78   12/09/17 124/78   11/24/17 130/82    Wt Readings from Last 3 Encounters:   12/22/17 192 lb (87.1 kg)   12/09/17 192 lb 14.4 oz (87.5 kg)   11/24/17 195 lb (88.5 kg)                  Patient Active Problem List   Diagnosis     Essential hypertension with goal blood pressure less than 140/90     Allergic state     Symptomatic menopausal or female climacteric states     Malignant neoplasm of left breast in female, estrogen receptor positive, unspecified site of breast (H)     Other type of migraine     Open wound of foot, excluding toe(s)     TOBACCO USE DISORDER- quit again 6/2012- off and on still 2016     Mild intermittent asthma without complication     Edema     Iron deficiency anemia     Exophthalmia     Non morbid obesity due to excess calories- prev. morbid obesity resolved s/p gastric bypass in 12/2009      Sleep apnea     RECURR Depressive disorder -PART REM [296.35]     Lobular Carcinoma in Situ of Breast-10/2009- - right breast     Lipoma of Skin and Subcutaneous Tissue-left arm      CARDIOVASCULAR  SCREENING; LDL GOAL LESS THAN 100     Advanced directives, counseling/discussion     Ischemic colitis     Lupus anticoagulant (LAC) with hemorrhagic disorder- was on coumadin for 1 year, now off     Blood glucose abnormal     GERD (gastroesophageal reflux disease)     Blood in stool- has internal hemorrhoids, per [facility], does not need FIT     Intestinal malabsorption- secondary to gastric bypass     S/P gastric bypass     Simple chronic bronchitis (H)     Thrombocytosis (H)     Left shoulder pain, unspecified chronicity     Aftercare following surgery of the musculoskeletal system     Past Surgical History:   Procedure Laterality Date     BONE MARROW BIOPSY, BONE SPECIMEN, NEEDLE/TROCAR Right 7/24/2017    Procedure: BIOPSY BONE MARROW;  BONE MARROW ASPIRATE and BIOPSY Right Iliac Crest;  Surgeon: Nicolas Mcclellan MD;  Location: [facility]     Breast reconstruction Left 2/00     COLONOSCOPY  2010    fair prep - needs repeat in 2013 per gastro     ESOPHAGOSCOPY, GASTROSCOPY, DUODENOSCOPY (EGD), COMBINED  11/1/2013    Procedure: COMBINED ESOPHAGOSCOPY, GASTROSCOPY, DUODENOSCOPY (EGD), BIOPSY SINGLE OR MULTIPLE;  ESOPHAGOSCOPY, GASTROSCOPY, DUODENOSCOPY (EGD) with bx;  Surgeon: Jas Lai DO;  Location: [facility]     ESOPHAGOSCOPY, GASTROSCOPY, DUODENOSCOPY (EGD), COMBINED N/A 5/20/2016    Procedure: COMBINED ESOPHAGOSCOPY, GASTROSCOPY, DUODENOSCOPY (EGD);  Surgeon: Junior Romano MD;  Location: [facility]     EXCISE LESION EYELID Right 12/24/2014    Procedure: EXCISE LESION EYELID;  Surgeon: Albin Acevedo MD;  [facility]     GASTRIC BYPASS  12/23/2009    Dr. Richards - [facility] - s/p laparoscopic Richard-en-y 2004     HYSTERECTOMY, YIFAN  7/2005    s/p YIFAN/BSO/fibroid/endometriosis-precancer from tamoxifen      incisional hernia with SB infarct and resection  9/05     Left mastectomy  2/00     LUMPECTOMY BREAST Right 10/2009     -  lobular carcinoma in-situ     LUMPECTOMY BREAST x2 Left 2/00     RECONSTRUCT EYELID  Right 12/24/2014    Procedure: RECONSTRUCT EYELID;  Surgeon: Albin Acevedo MD;  [facility]     right achilles torn, tendon graft  2004     TONSILLECTOMY, ADENOIDECTOMY, COMBINED  1963     WEDGE RESECTION EYELID Right 9/12/2014    Procedure: WEDGE RESECTION EYELID;  Surgeon: Albin Acevedo MD;  Location: [facility]       Social History   Substance Use Topics     Smoking status: Current Every Day Smoker     Packs/day: 0.30     Years: 40.00     Types: Cigarettes     Last attempt to quit: 6/5/2012     Smokeless tobacco: Never Used      Comment: started again 8/05 - strongly encourage cessation with every visit - needs to quit for weight loss surgery - noted 7/09      Alcohol use 0.0 oz/week     0 Standard drinks or equivalent per week      Comment: 0-2 per week     Family History   Problem Relation Age of Onset     CANCER Mother      Cardiovascular Mother      DIABETES Mother      juanjose morales     Hypertension Mother      C.A.D. Mother      juanjose morales     Cardiovascular Father      fluid around heart - takes HCTZ - pt doesn't think dx is CHF      CANCER Father      Hypertension Father      Hypertension Paternal Grandmother      C.A.D. Paternal Grandmother      Cancer - colorectal No family hx of          Current Outpatient Prescriptions   Medication Sig Dispense Refill     acyclovir (ZOVIRAX) 800 MG tablet Take one tablet by mouth three times daily  for 2 days 18 tablet 12     aspirin 81 MG tablet Take 1 tablet (81 mg) by mouth daily 30 tablet      acetaminophen-codeine (TYLENOL WITH CODEINE #3) 300-30 MG per tablet Take 1-2 tablets by mouth every 4 hours as needed for mild pain 28 tablet 0     hydroxyurea (HYDREA) 500 MG capsule CHEMO Take by mouth daily       amLODIPine (NORVASC) 5 MG tablet Take 1 tablet (5 mg) by mouth every evening 90 tablet 1     hydrochlorothiazide (HYDRODIURIL) 25 MG tablet TAKE 1 TABLET BY MOUTH EVERY DAY IN THE MORNING 90 tablet 1     fluticasone (FLONASE) 50 MCG/ACT spray  Spray 1-2 sprays into both nostrils daily as needed for rhinitis or allergies       Metoprolol Tartrate (LOPRESSOR PO) Take 25 mg by mouth every morning       sucralfate (CARAFATE) 1 GM tablet Take 1 g by mouth 4 times daily as needed       ANORO ELLIPTA 62.5-25 MCG/INH oral inhaler Inhale 1 puff into the lungs daily   11     buPROPion (ZYBAN) 150 MG 12 hr tablet Take 150 mg by mouth 2 times daily   5     omeprazole (PRILOSEC) 40 MG capsule TAKE ONE CAPSULE BY MOUTH TWICE DAILY 180 capsule 3     mometasone (ELOCON) 0.1 % cream APPLY TOPICALLY TO HANDS FOR UP TO 14 DAYS AT A TIME  45 g 1     glucosamine-chondroitin 500-400 MG CAPS Take 1 capsule by mouth At Bedtime       potassium 99 MG TABS Take 1 tablet by mouth daily       cyanocolbalamin (VITAMIN  B-12) 500 MCG tablet Take 500 mcg by mouth daily       Vitamin D, Cholecalciferol, 1000 UNITS TABS Take 1 tablet by mouth daily       cetirizine (ZYRTEC) 10 MG tablet Take 10 mg by mouth daily        calcium-vitamin D (CALCIUM 600 + D) 600-400 MG-UNIT per tablet Take 1 tablet by mouth 2 times daily 60 tablet      [DISCONTINUED] acyclovir (ZOVIRAX) 800 MG tablet Take one tablet by mouth three times daily  for 2 days 18 tablet 12     Allergies   Allergen Reactions     Amoxicillin Anaphylaxis     Penicillins Anaphylaxis and Swelling     Passed out with tongue swelling in 1980's , but has had augmentin in 2013 without any problems.      Chantix [Varenicline]      Bad nightmares      Estrogens      Can't be on secondary to HX of estrogen receptor positive breast cancer      Ibuprofen Rash     Lisinopril Cough     Recent Labs   Lab Test  12/09/17   0841  07/17/17   1542  10/22/16   1215  09/23/16   0840  05/18/16   1611   10/23/15   0837   07/11/14   1711   08/26/13   0912   A1C   --    --    --   5.6   --    --   5.3   --   5.6   --   5.7   LDL   --    --    --   92   --    --   98   --    --    --   67   HDL   --    --    --   42*   --    --   44*   --    --    --   45*    TRIG   --    --    --   143   --    --   123   --    --    --   94   ALT  15   --    --   20  33   < >  14   < >  19   < >  21   CR  0.75  0.76   --   0.65  0.74   < >  0.69   < >  0.75   < >  0.58   GFRESTIMATED  79  78   --   >90  Non  GFR Calc    81   < >  88   < >  80   < >  >90   GFRESTBLACK  >90  >90  African American GFR Calc     --   >90   GFR Calc    >90   GFR Calc     < >  >90   GFR Calc     < >  >90   < >  >90   POTASSIUM  3.4  4.6   --   4.2  4.0   < >  4.0   < >  4.1   < >  3.9   TSH   --    --   0.60  0.38*  0.66   < >   --    --    --    --   0.58    < > = values in this interval not displayed.      Mammogram: Patient over age 50, mutual decision to screen reflected in health maintenance.    Ma Screening Digital Right    Result Date: 11/3/2015  Narrative: SCREENING MAMMOGRAM, RIGHT, DIGITAL w/CAD, 11/3/2015 11:33 AM BREAST DENSITY: Scattered fibroglandular densities. CLINICAL INFORMATION:  Encounter for screening mammogram for malignant neoplasm of breast, 10/31/2013,10/24/2011 FINDINGS: Negative. Screening exam in one year recommended.       History of abnormal Pap smear: YES - updated in Problem List and Health Maintenance accordingly    Lab Results   Component Value Date    PAP NIL 07/20/2010    PAP NIL 07/15/2009         Reviewed and updated as needed this visit by clinical staff  Tobacco  Allergies  Meds  Med Hx  Surg Hx  Fam Hx  Soc Hx        Reviewed and updated as needed this visit by Provider  Tobacco  Med Hx  Surg Hx        Past Medical History:   Diagnosis Date     Abnormal glucose     175 nonfasting - hgb a1c 2/3/06 = 6.3 prior to gastric bypass     Breast cancer (H) 1999    Dr Recio -estrogen receptor positive tumor      Depression     was on citalopram - worked well - now off meds      Edema     left ankle      Essential hypertension 1999    has been on diovan- no longer covered by insurance, lisinopril = dry  cough, cozaar =terrible headaches        Gastro-oesophageal reflux disease      Ischemic colitis (H) 11/2011     Leukocytosis      Lupus anticoagulant disorder (H)     on coumadin- seeing Dr. Kelly     Migraine     were menstrual , now a bit more cluster-like      Obesity, morbid (H)      JESICA (obstructive sleep apnea)     has not used CPAP since gastric bypass     Seasonal allergies     seasonal     Thrombocytosis (H)       Past Surgical History:   Procedure Laterality Date     BONE MARROW BIOPSY, BONE SPECIMEN, NEEDLE/TROCAR Right 7/24/2017    Procedure: BIOPSY BONE MARROW;  BONE MARROW ASPIRATE and BIOPSY Right Iliac Crest;  Surgeon: Nicolas Mcclellan MD;  Location: [facility]     Breast reconstruction Left 2/00     COLONOSCOPY  2010    fair prep - needs repeat in 2013 per gastro     ESOPHAGOSCOPY, GASTROSCOPY, DUODENOSCOPY (EGD), COMBINED  11/1/2013    Procedure: COMBINED ESOPHAGOSCOPY, GASTROSCOPY, DUODENOSCOPY (EGD), BIOPSY SINGLE OR MULTIPLE;  ESOPHAGOSCOPY, GASTROSCOPY, DUODENOSCOPY (EGD) with bx;  Surgeon: Jas Lai DO;  [facility]     ESOPHAGOSCOPY, GASTROSCOPY, DUODENOSCOPY (EGD), COMBINED N/A 5/20/2016    Procedure: COMBINED ESOPHAGOSCOPY, GASTROSCOPY, DUODENOSCOPY (EGD);  Surgeon: Junior Romano MD;  [facility]     EXCISE LESION EYELID Right 12/24/2014    Procedure: EXCISE LESION EYELID;  Surgeon: Albin Acevedo MD;  Location: [facility]     GASTRIC BYPASS  12/23/2009    Dr. Susie [facility] - s/p laparoscopic Richard-en-y 2004     HYSTERECTOMY, YIFAN  7/2005    s/p YIFAN/BSO/fibroid/endometriosis-precancer from tamoxifen      incisional hernia with SB infarct and resection  9/05     Left mastectomy  2/00     LUMPECTOMY BREAST Right 10/2009     -  lobular carcinoma in-situ     LUMPECTOMY BREAST x2 Left 2/00     RECONSTRUCT EYELID Right 12/24/2014    Procedure: RECONSTRUCT EYELID;  Surgeon: Albin Acevedo MD;  Location: [facility]     right achilles torn, tendon graft  2004      TONSILLECTOMY, ADENOIDECTOMY, COMBINED  1963     WEDGE RESECTION EYELID Right 9/12/2014    Procedure: WEDGE RESECTION EYELID;  Surgeon: Albin Acevedo MD;  Location: [facility]     Obstetric History     The patient has never been pregnant.            ROS:  C: NEGATIVE for fever, chills, change in weight  I: NEGATIVE for worrisome rashes, moles or lesions  E: NEGATIVE for vision changes or irritation  ENT: NEGATIVE for ear, mouth and throat problems  R: NEGATIVE for significant cough or SOB  B: NEGATIVE for masses, tenderness or discharge  CV: NEGATIVE for chest pain, palpitations or peripheral edema  GI: NEGATIVE for nausea, abdominal pain, heartburn, or change in bowel habits  : NEGATIVE for unusual urinary or vaginal symptoms. No vaginal bleeding.  M: NEGATIVE for significant arthralgias or myalgia  N: NEGATIVE for weakness, dizziness or paresthesias  E: NEGATIVE for temperature intolerance, skin/hair changes  P: NEGATIVE for changes in mood or affect     CBC RESULTS:   Recent Labs   Lab Test  12/09/17   0841   WBC  7.2   RBC  4.31   HGB  15.0   HCT  42.9   MCV  100   MCH  34.8*   MCHC  35.0   RDW  12.1   PLT  453*     Recent Labs   Lab Test  09/23/16   0840  10/23/15   0837  08/26/13   0912   CHOL  163  167  131   HDL  42*  44*  45*   LDL  92  98  67   TRIG  143  123  94   CHOLHDLRATIO   --   3.8  2.9     .basic metabolic panel  Last Basic Metabolic Panel:  Lab Results   Component Value Date     12/09/2017      Lab Results   Component Value Date    POTASSIUM 3.4 12/09/2017     Lab Results   Component Value Date    CHLORIDE 96 12/09/2017     Lab Results   Component Value Date    BEV 9.1 12/09/2017     Lab Results   Component Value Date    CO2 31 12/09/2017     Lab Results   Component Value Date    BUN 19 12/09/2017     Lab Results   Component Value Date    CR 0.75 12/09/2017     Lab Results   Component Value Date    GLC 82 12/09/2017     Lab Results   Component Value Date    AST 17 12/09/2017     Lab  "Results   Component Value Date    ALT 15 12/09/2017     Lab Results   Component Value Date    BILICONJ 0.0 08/19/2009      Lab Results   Component Value Date    BILITOTAL 0.6 12/09/2017     Lab Results   Component Value Date    ALBUMIN 3.4 12/09/2017     Lab Results   Component Value Date    PROTTOTAL 6.1 12/09/2017      Lab Results   Component Value Date    ALKPHOS 83 12/09/2017       BP Readings from Last 3 Encounters:   12/22/17 126/78   12/09/17 124/78   11/24/17 130/82       OBJECTIVE:   /78  Pulse 55  Temp 97.8  F (36.6  C) (Oral)  Ht 5' 5.75\" (1.67 m)  Wt 192 lb (87.1 kg)  LMP 09/21/2004  SpO2 96%  BMI 31.23 kg/m2  EXAM:  GENERAL APPEARANCE: healthy, alert and no distress  EYES: Eyes grossly normal to inspection, PERRL and conjunctivae and sclerae normal  HENT: ear canals and TM's normal, nose and mouth without ulcers or lesions, oropharynx clear and oral mucous membranes moist  NECK: no adenopathy, no asymmetry, masses, or scars and thyroid normal to palpation  RESP: lungs clear to auscultation - no rales, rhonchi or wheezes  BREAST: normal without masses, tenderness or nipple discharge and no palpable axillary masses or adenopathy  CV: regular rate and rhythm, normal S1 S2, no S3 or S4, no murmur, click or rub, no peripheral edema and peripheral pulses strong  ABDOMEN: soft, nontender, no hepatosplenomegaly, no masses and bowel sounds normal   (female): normal female external genitalia, normal urethral meatus, vaginal mucosal atrophy noted, surgically absent cervix, adnexae, and uterus without masses or abnormal discharge.multiple labial inclusion cysts noted bilaterally.   MS: no musculoskeletal defects are noted and gait is age appropriate without ataxia  SKIN: no suspicious lesions or rashes  NEURO: Normal strength and tone, sensory exam grossly normal, mentation intact and speech normal  PSYCH: mentation appears normal and affect normal/bright    ASSESSMENT/PLAN:       ICD-10-CM    1. " "Encounter for routine adult medical exam with abnormal findings Z00.01    2. CARDIOVASCULAR SCREENING; LDL GOAL LESS THAN 100 Z13.6 Lipid panel reflex to direct LDL Fasting   3. Screen for colon cancer Z12.11 GASTROENTEROLOGY ADULT REF PROCEDURE ONLY   4. Mild intermittent asthma without complication J45.20 COPD ACTION PLAN   5. HSV (herpes simplex virus) infection B00.9 acyclovir (ZOVIRAX) 800 MG tablet   6. Essential hypertension with goal blood pressure less than 140/90 I10    7. S/P gastric bypass Z98.84 Vitamin B12     Folate       COUNSELING:   Reviewed preventive health counseling, as reflected in patient instructions    Recheck bp with me again in 6 months or sooner, if needed.        reports that she has been smoking Cigarettes.  She has a 12.00 pack-year smoking history. She has never used smokeless tobacco. strongly encouraged cessation prior to her shoulder surgery next week.   Tobacco Cessation Action Plan: Pharmacotherapies : Nicotine patch- starting them next week - quit date = 12/26/2017.   Estimated body mass index is 31.23 kg/(m^2) as calculated from the following:    Height as of this encounter: 5' 5.75\" (1.67 m).    Weight as of this encounter: 192 lb (87.1 kg).   Weight management plan: see next     Establish an exercise regimen. Activity goal: 45 minutes 5 days a week. New exercise routine: cardiovascular workout on exercise equipment, walking and weightlifting. Diet regimen was discussed and plan is self-directed dieting: reduce calories, reduce portions, reduce processed  carbs, increase fruits/vegetables and avoid sweets and supervised diet program. Avoid artificial sweeteners and \"diet\" drinks and sodas.       Start walking for exercise - If walking outside,   - rain or shine - walk a block, then come home, next day walk   2 blocks , then come home ; and then add a block further from home daily - work up to 30-45minutes daily or 3-4x/week - or can work  up to  3-4 miles briskly daily.   "     If walking on treadmill or at  the mall, start with 5 minutes first day, then 6 minutes next day , then 7 minutes next day, then 8 minutes next day, etc.   Gradually work up to the above goals.  No stopping.        Counseling Resources:  ATP IV Guidelines  Pooled Cohorts Equation Calculator  Breast Cancer Risk Calculator  FRAX Risk Assessment  ICSI Preventive Guidelines  Dietary Guidelines for Americans, 2010  USDA's MyPlate  ASA Prophylaxis  Lung CA Screening    Lisa Mcguire MD  [facility]

## 2017-12-22 NOTE — MR AVS SNAPSHOT
"              After Visit Summary   12/22/2017    Stefany Vega    MRN: [id number]           Patient Information     Date Of Birth          1955        Visit Information        Provider Department      12/22/2017 8:15 AM Lisa Mcguire MD [facility] [place]        Today's Diagnoses     Encounter for routine adult medical exam with abnormal findings    -  1    CARDIOVASCULAR SCREENING; LDL GOAL LESS THAN 100        Screen for colon cancer        Mild intermittent asthma without complication        HSV (herpes simplex virus) infection        Essential hypertension with goal blood pressure less than 140/90        S/P gastric bypass          Care Instructions    Establish an exercise regimen. Activity goal: 45 minutes 5 days a week. New exercise routine: cardiovascular workout on exercise equipment, walking and weightlifting. Diet regimen was discussed and plan is self-directed dieting: reduce calories, reduce portions, reduce processed  carbs, increase fruits/vegetables and avoid sweets and supervised diet program. Avoid artificial sweeteners and \"diet\" drinks and sodas.       Start walking for exercise - If walking outside,   - rain or shine - walk a block, then come home, next day walk   2 blocks , then come home ; and then add a block further from home daily - work up to 30-45minutes daily or 3-4x/week - or can work  up to  3-4 miles briskly daily.       If walking on treadmill or at  the mall, start with 5 minutes first day, then 6 minutes next day , then 7 minutes next day, then 8 minutes next day, etc.   Gradually work up to the above goals.  No stopping.      Preventive Health Recommendations  Female Ages 50 - 64    Yearly exam: See your health care provider every year in order to  o Review health changes.   o Discuss preventive care.    o Review your medicines if your doctor has prescribed any.      Get a Pap test every three years (unless you have an abnormal result and your " provider advises testing more often).    If you get Pap tests with HPV test, you only need to test every 5 years, unless you have an abnormal result.     You do not need a Pap test if your uterus was removed (hysterectomy) and you have not had cancer.    You should be tested each year for STDs (sexually transmitted diseases) if you're at risk.     Have a mammogram every 1 to 2 years.    Have a colonoscopy at age 50, or have a yearly FIT test (stool test). These exams screen for colon cancer.      Have a cholesterol test every 5 years, or more often if advised.    Have a diabetes test (fasting glucose) every three years. If you are at risk for diabetes, you should have this test more often.     If you are at risk for osteoporosis (brittle bone disease), think about having a bone density scan (DEXA).    Shots: Get a flu shot each year. Get a tetanus shot every 10 years.    Nutrition:     Eat at least 5 servings of fruits and vegetables each day.    Eat whole-grain bread, whole-wheat pasta and brown rice instead of white grains and rice.    Talk to your provider about Calcium and Vitamin D.     Lifestyle    Exercise at least 150 minutes a week (30 minutes a day, 5 days a week). This will help you control your weight and prevent disease.    Limit alcohol to one drink per day.    No smoking.     Wear sunscreen to prevent skin cancer.     See your dentist every six months for an exam and cleaning.    See your eye doctor every 1 to 2 years.      Recheck with me again in 6 months or sooner, if needed.              Thank you for choosing [facility]  for your Health Care. It was a pleasure seeing you at your visit today. Please contact us with any questions or concerns you may have.                   Lisa Mcguire MD                                  To reach your [facility] care team after hours call:   [phone]    Our clinic hours are:     Monday- 7:30 am - 7:00 pm                              Tuesday through Friday- 7:30 am - 5:00 pm                                        Saturday- 8:00 am - 12:00 pm                  Phone:  [phone]    Our pharmacy hours are:     Monday  8:00 am to 7:00 pm      Tuesday through Friday 8:00am to 6:00pm                        Saturday - 9:00 am to 1:00 pm      Sunday : Closed.              Phone:  [phone]      There is also information available at our web site:  [email or web address]    If your provider ordered any lab tests and you do not receive the results within 10 business days, please call the clinic.    If you need a medication refill please contact your pharmacy.  Please allow 2 business days for your refill to be completed.    Our clinic offers telephone visits and e visits.  Please ask one of your team members to explain more.      Use [employer] (secure email communication and access to your chart) to send your primary care provider a message or make an appointment. Ask someone on your Team how to sign up for [employer].                         Follow-ups after your visit        Additional Services     GASTROENTEROLOGY ADULT REF PROCEDURE ONLY       Last Lab Result: Creatinine (mg/dL)       Date                     Value                 12/09/2017               0.75             ----------  There is no height or weight on file to calculate BMI.     Needed:  No  Language:  English    Patient will be contacted to schedule procedure.     Please be aware that coverage of these services is subject to the terms and limitations of your health insurance plan.  Call member services at your health plan with any benefit or coverage questions.  Any procedures must be performed at a [place] facility OR coordinated by your clinic's referral office.    Please bring the following with you to your appointment:    (1) Any X-Rays, CTs or MRIs which have been performed.  Contact the facility where they were done to arrange for  prior to your  scheduled appointment.    (2) List of current medications   (3) This referral request   (4) Any documents/labs given to you for this referral                  Follow-up notes from your care team     Return in about 6 months (around 6/22/2018).      Your next 10 appointments already scheduled     Jan 02, 2018  9:40 AM CST   Return Visit with Corky Fuentes PA-C   [facility] ORTHOPEDIC SURGERY ([facility])    [address]  [address]  [facility] [address]   [phone]              Future tests that were ordered for you today     Open Future Orders        Priority Expected Expires Ordered    GASTROENTEROLOGY ADULT REF PROCEDURE ONLY Routine 2/5/2018 12/22/2018 12/22/2017            Who to contact     If you have questions or need follow up information about today's clinic visit or your schedule please contact [facility] directly at [phone].  Normal or non-critical lab and imaging results will be communicated to you by MyChart, letter or phone within 4 business days after the clinic has received the results. If you do not hear from us within 7 days, please contact the clinic through MyChart or phone. If you have a critical or abnormal lab result, we will notify you by phone as soon as possible.  Submit refill requests through [employer] or call your pharmacy and they will forward the refill request to us. Please allow 3 business days for your refill to be completed.          Additional Information About Your Visit        MyChart Information     [employer] gives you secure access to your electronic health record. If you see a primary care provider, you can also send messages to your care team and make appointments. If you have questions, please call your primary care clinic.  If you do not have a primary care provider, please call [phone] and they will assist you.        Care EveryWhere ID     This is your Care EveryWhere ID. This could be used by other  "organizations to access your [facility] medical records  [id number]        Your Vitals Were     Pulse Temperature Height Last Period Pulse Oximetry BMI (Body Mass Index)    55 97.8  F (36.6  C) (Oral) 5' 5.75\" (1.67 m) 09/21/2004 96% 31.23 kg/m2       Blood Pressure from Last 3 Encounters:   12/22/17 126/78   12/09/17 124/78   11/24/17 130/82    Weight from Last 3 Encounters:   12/22/17 192 lb (87.1 kg)   12/09/17 192 lb 14.4 oz (87.5 kg)   11/24/17 195 lb (88.5 kg)              We Performed the Following     COPD ACTION PLAN     Folate     Lipid panel reflex to direct LDL Fasting     Vitamin B12          Where to get your medicines      These medications were sent to [facility] [place] - [address] [address]     Phone:  [phone]     acyclovir 800 MG tablet          Primary Care Provider Office Phone # Fax #    Lisa Mcguire [phone]       [address] [place] [address]        Equal Access to Services     SHARITA PEREZ AH: Hadii august croweo Soalbaali, waaxda luqadaha, qaybta kaalmada adeegyada, debbie shen. So [facility] [phone].    ATENCIÓN: Si habla español, tiene a kenny disposición servicios gratuitos de asistencia lingüística. [facility] [phone].    We comply with applicable federal civil rights laws and [place] laws. We do not discriminate on the basis of race, color, national origin, age, disability, sex, sexual orientation, or gender identity.            Thank you!     Thank you for choosing [facility]  for your care. Our goal is always to provide you with excellent care. Hearing back from our patients is one way we can continue to improve our services. Please take a few minutes to complete the written survey that you may receive in the mail after your visit with us. Thank you!             Your Updated Medication List - Protect others " around you: Learn how to safely use, store and throw away your medicines at www.disposemymeds.org.          This list is accurate as of: 12/22/17  9:08 AM.  Always use your most recent med list.                   Brand Name Dispense Instructions for use Diagnosis    acetaminophen-codeine 300-30 MG per tablet    TYLENOL WITH CODEINE #3    28 tablet    Take 1-2 tablets by mouth every 4 hours as needed for mild pain    Impingement syndrome of left shoulder, Supraspinatus tendon tear, left, initial encounter, Labral tear of long head of biceps tendon, left, initial encounter       acyclovir 800 MG tablet    ZOVIRAX    18 tablet    Take one tablet by mouth three times daily  for 2 days    HSV (herpes simplex virus) infection       amLODIPine 5 MG tablet    NORVASC    90 tablet    Take 1 tablet (5 mg) by mouth every evening    Essential hypertension with goal blood pressure less than 140/90       ANORO ELLIPTA 62.5-25 MCG/INH oral inhaler   Generic drug:  umeclidinium-vilanterol      Inhale 1 puff into the lungs daily        aspirin 81 MG tablet     30 tablet    Take 1 tablet (81 mg) by mouth daily    Essential thrombocythemia (H)       buPROPion 150 MG 12 hr tablet    ZYBAN     Take 150 mg by mouth 2 times daily        calcium 600 + D 600-400 MG-UNIT per tablet   Generic drug:  calcium-vitamin D     60 tablet    Take 1 tablet by mouth 2 times daily        cetirizine 10 MG tablet    zyrTEC     Take 10 mg by mouth daily        cyanocolbalamin 500 MCG tablet    vitamin  B-12     Take 500 mcg by mouth daily        fluticasone 50 MCG/ACT spray    FLONASE     Spray 1-2 sprays into both nostrils daily as needed for rhinitis or allergies        glucosamine-chondroitin 500-400 MG Caps per capsule      Take 1 capsule by mouth At Bedtime        hydrochlorothiazide 25 MG tablet    HYDRODIURIL    90 tablet    TAKE 1 TABLET BY MOUTH EVERY DAY IN THE MORNING    Essential hypertension with goal blood pressure less than 140/90        hydroxyurea 500 MG capsule CHEMO    HYDREA     Take by mouth daily        LOPRESSOR PO      Take 25 mg by mouth every morning        mometasone 0.1 % cream    ELOCON    45 g    APPLY TOPICALLY TO HANDS FOR UP TO 14 DAYS AT A TIME    Dermatitis       omeprazole 40 MG capsule    priLOSEC    180 capsule    TAKE ONE CAPSULE BY MOUTH TWICE DAILY    GERD (gastroesophageal reflux disease)       potassium 99 MG Tabs      Take 1 tablet by mouth daily        sucralfate 1 GM tablet    CARAFATE     Take 1 g by mouth 4 times daily as needed        Vitamin D (Cholecalciferol) 1000 UNITS Tabs      Take 1 tablet by mouth daily

## 2017-12-22 NOTE — NURSING NOTE
"Chief Complaint   Patient presents with     Physical       Initial BP (!) 148/98 (BP Location: Right arm, Patient Position: Chair, Cuff Size: Adult Large)  Pulse 55  Temp 97.8  F (36.6  C) (Oral)  Ht 5' 5.75\" (1.67 m)  Wt 192 lb (87.1 kg)  LMP 09/21/2004  SpO2 96%  BMI 31.23 kg/m2 Estimated body mass index is 31.23 kg/(m^2) as calculated from the following:    Height as of this encounter: 5' 5.75\" (1.67 m).    Weight as of this encounter: 192 lb (87.1 kg).  Medication Reconciliation: complete   Taina Ly CMA  "

## 2018-01-01 ENCOUNTER — TELEPHONE (OUTPATIENT)
Dept: FAMILY MEDICINE | Facility: CLINIC | Age: 63
End: 2018-01-01

## 2018-01-01 ENCOUNTER — APPOINTMENT (OUTPATIENT)
Dept: ULTRASOUND IMAGING | Facility: CLINIC | Age: 63
End: 2018-01-01
Attending: SURGERY
Payer: COMMERCIAL

## 2018-01-01 ENCOUNTER — APPOINTMENT (OUTPATIENT)
Dept: GENERAL RADIOLOGY | Facility: CLINIC | Age: 63
End: 2018-01-01
Attending: EMERGENCY MEDICINE
Payer: COMMERCIAL

## 2018-01-01 ENCOUNTER — APPOINTMENT (OUTPATIENT)
Dept: ULTRASOUND IMAGING | Facility: CLINIC | Age: 63
End: 2018-01-01
Attending: EMERGENCY MEDICINE
Payer: COMMERCIAL

## 2018-01-01 ENCOUNTER — APPOINTMENT (OUTPATIENT)
Dept: CT IMAGING | Facility: CLINIC | Age: 63
End: 2018-01-01
Attending: EMERGENCY MEDICINE
Payer: COMMERCIAL

## 2018-01-01 ENCOUNTER — OFFICE VISIT (OUTPATIENT)
Dept: ORTHOPEDICS | Facility: CLINIC | Age: 63
End: 2018-01-01
Payer: COMMERCIAL

## 2018-01-01 ENCOUNTER — HOSPITAL ENCOUNTER (INPATIENT)
Facility: CLINIC | Age: 63
LOS: 1 days | End: 2018-01-05
Attending: EMERGENCY MEDICINE | Admitting: INTERNAL MEDICINE
Payer: COMMERCIAL

## 2018-01-01 VITALS
HEART RATE: 92 BPM | OXYGEN SATURATION: 86 % | TEMPERATURE: 104.2 F | RESPIRATION RATE: 36 BRPM | BODY MASS INDEX: 31.23 KG/M2 | SYSTOLIC BLOOD PRESSURE: 66 MMHG | DIASTOLIC BLOOD PRESSURE: 43 MMHG | WEIGHT: 192.02 LBS

## 2018-01-01 DIAGNOSIS — G89.18 POST-OP PAIN: ICD-10-CM

## 2018-01-01 DIAGNOSIS — K72.00 SHOCK LIVER: ICD-10-CM

## 2018-01-01 DIAGNOSIS — R57.9 SHOCK (H): ICD-10-CM

## 2018-01-01 DIAGNOSIS — N17.9 ACUTE KIDNEY INJURY (H): ICD-10-CM

## 2018-01-01 DIAGNOSIS — R79.89 ELEVATED TROPONIN: ICD-10-CM

## 2018-01-01 DIAGNOSIS — K80.20 CALCULUS OF GALLBLADDER WITHOUT CHOLECYSTITIS WITHOUT OBSTRUCTION: ICD-10-CM

## 2018-01-01 DIAGNOSIS — R06.89 RESPIRATORY INSUFFICIENCY: ICD-10-CM

## 2018-01-01 DIAGNOSIS — J18.9 PNEUMONIA OF BOTH LOWER LOBES DUE TO INFECTIOUS ORGANISM: ICD-10-CM

## 2018-01-01 DIAGNOSIS — E87.20 LACTIC ACIDOSIS: ICD-10-CM

## 2018-01-01 DIAGNOSIS — Z47.89 ORTHOPEDIC AFTERCARE: Primary | ICD-10-CM

## 2018-01-01 DIAGNOSIS — G93.40 ENCEPHALOPATHY: ICD-10-CM

## 2018-01-01 LAB
ABO + RH BLD: NORMAL
ABO + RH BLD: NORMAL
ALBUMIN SERPL-MCNC: 1.7 G/DL (ref 3.4–5)
ALBUMIN SERPL-MCNC: 1.9 G/DL (ref 3.4–5)
ALBUMIN SERPL-MCNC: 2 G/DL (ref 3.4–5)
ALBUMIN UR-MCNC: 30 MG/DL
ALP SERPL-CCNC: 295 U/L (ref 40–150)
ALP SERPL-CCNC: 322 U/L (ref 40–150)
ALP SERPL-CCNC: 372 U/L (ref 40–150)
ALT SERPL W P-5'-P-CCNC: 414 U/L (ref 0–50)
ALT SERPL W P-5'-P-CCNC: 465 U/L (ref 0–50)
ALT SERPL W P-5'-P-CCNC: 564 U/L (ref 0–50)
AMMONIA PLAS-SCNC: 73 UMOL/L (ref 10–50)
AMYLASE SERPL-CCNC: 63 U/L (ref 30–110)
ANION GAP SERPL CALCULATED.3IONS-SCNC: 21 MMOL/L (ref 3–14)
ANION GAP SERPL CALCULATED.3IONS-SCNC: 22 MMOL/L (ref 3–14)
ANION GAP SERPL CALCULATED.3IONS-SCNC: 22 MMOL/L (ref 6–17)
ANION GAP SERPL CALCULATED.3IONS-SCNC: 27 MMOL/L (ref 3–14)
APPEARANCE UR: ABNORMAL
APTT PPP: 92 SEC (ref 22–37)
AST SERPL W P-5'-P-CCNC: 1142 U/L (ref 0–45)
AST SERPL W P-5'-P-CCNC: 768 U/L (ref 0–45)
AST SERPL W P-5'-P-CCNC: 894 U/L (ref 0–45)
BACTERIA #/AREA URNS HPF: ABNORMAL /HPF
BASE DEFICIT BLDA-SCNC: 11.7 MMOL/L
BASE DEFICIT BLDA-SCNC: 12.1 MMOL/L
BASE DEFICIT BLDA-SCNC: 14.7 MMOL/L
BASE DEFICIT BLDA-SCNC: 16 MMOL/L
BASE DEFICIT BLDA-SCNC: 8 MMOL/L
BASE DEFICIT BLDV-SCNC: 9.6 MMOL/L
BASOPHILS # BLD AUTO: 0 10E9/L (ref 0–0.2)
BASOPHILS NFR BLD AUTO: 0 %
BILIRUB SERPL-MCNC: 6.9 MG/DL (ref 0.2–1.3)
BILIRUB SERPL-MCNC: 8.3 MG/DL (ref 0.2–1.3)
BILIRUB SERPL-MCNC: 8.5 MG/DL (ref 0.2–1.3)
BILIRUB UR QL STRIP: NEGATIVE
BLD PROD TYP BPU: NORMAL
BLD UNIT ID BPU: 0
BLD UNIT ID BPU: 0
BLOOD PRODUCT CODE: NORMAL
BLOOD PRODUCT CODE: NORMAL
BPU ID: NORMAL
BPU ID: NORMAL
BUN SERPL-MCNC: 30 MG/DL (ref 7–30)
BUN SERPL-MCNC: 33 MG/DL (ref 7–30)
BUN SERPL-MCNC: 33 MG/DL (ref 7–30)
BUN SERPL-MCNC: 34 MG/DL (ref 7–30)
BURR CELLS BLD QL SMEAR: ABNORMAL
CA-I BLD-MCNC: 4.3 MG/DL (ref 4.4–5.2)
CA-I BLD-SCNC: 3.9 MG/DL (ref 4.4–5.2)
CALCIUM SERPL-MCNC: 7.4 MG/DL (ref 8.5–10.1)
CALCIUM SERPL-MCNC: 7.5 MG/DL (ref 8.5–10.1)
CALCIUM SERPL-MCNC: 9 MG/DL (ref 8.5–10.1)
CHLORIDE BLD-SCNC: 84 MMOL/L (ref 94–109)
CHLORIDE SERPL-SCNC: 88 MMOL/L (ref 94–109)
CO2 BLD-SCNC: 18 MMOL/L (ref 20–32)
CO2 BLDCOV-SCNC: 15 MMOL/L (ref 21–28)
CO2 BLDCOV-SCNC: 18 MMOL/L (ref 21–28)
CO2 SERPL-SCNC: 14 MMOL/L (ref 20–32)
CO2 SERPL-SCNC: 16 MMOL/L (ref 20–32)
CO2 SERPL-SCNC: 16 MMOL/L (ref 20–32)
COLOR UR AUTO: YELLOW
CREAT BLD-MCNC: 2.6 MG/DL (ref 0.52–1.04)
CREAT SERPL-MCNC: 2.07 MG/DL (ref 0.52–1.04)
CREAT SERPL-MCNC: 2.19 MG/DL (ref 0.52–1.04)
CREAT SERPL-MCNC: 2.35 MG/DL (ref 0.52–1.04)
DIFFERENTIAL METHOD BLD: ABNORMAL
DOHLE BOD BLD QL SMEAR: PRESENT
EOSINOPHIL # BLD AUTO: 0.1 10E9/L (ref 0–0.7)
EOSINOPHIL NFR BLD AUTO: 2 %
ERYTHROCYTE [DISTWIDTH] IN BLOOD BY AUTOMATED COUNT: 14 % (ref 10–15)
ERYTHROCYTE [DISTWIDTH] IN BLOOD BY AUTOMATED COUNT: 14.6 % (ref 10–15)
FIBRINOGEN PPP-MCNC: 232 MG/DL (ref 200–420)
FLUAV+FLUBV RNA SPEC QL NAA+PROBE: NEGATIVE
FLUAV+FLUBV RNA SPEC QL NAA+PROBE: NEGATIVE
GFR SERPL CREATININE-BSD FRML MDRD: 19 ML/MIN/1.7M2
GFR SERPL CREATININE-BSD FRML MDRD: 21 ML/MIN/1.7M2
GFR SERPL CREATININE-BSD FRML MDRD: 23 ML/MIN/1.7M2
GFR SERPL CREATININE-BSD FRML MDRD: 24 ML/MIN/1.7M2
GLUCOSE BLD-MCNC: 165 MG/DL (ref 70–99)
GLUCOSE BLDC GLUCOMTR-MCNC: 100 MG/DL (ref 70–99)
GLUCOSE BLDC GLUCOMTR-MCNC: 183 MG/DL (ref 70–99)
GLUCOSE BLDC GLUCOMTR-MCNC: 196 MG/DL (ref 70–99)
GLUCOSE BLDC GLUCOMTR-MCNC: 44 MG/DL (ref 70–99)
GLUCOSE BLDC GLUCOMTR-MCNC: 86 MG/DL (ref 70–99)
GLUCOSE BLDC GLUCOMTR-MCNC: <10 MG/DL (ref 70–99)
GLUCOSE SERPL-MCNC: 162 MG/DL (ref 70–99)
GLUCOSE SERPL-MCNC: 217 MG/DL (ref 70–99)
GLUCOSE SERPL-MCNC: 84 MG/DL (ref 70–99)
GLUCOSE UR STRIP-MCNC: NEGATIVE MG/DL
HCO3 BLD-SCNC: 11 MMOL/L (ref 21–28)
HCO3 BLD-SCNC: 14 MMOL/L (ref 21–28)
HCO3 BLD-SCNC: 15 MMOL/L (ref 21–28)
HCO3 BLD-SCNC: 16 MMOL/L (ref 21–28)
HCO3 BLD-SCNC: 18 MMOL/L (ref 21–28)
HCO3 BLDV-SCNC: 18 MMOL/L (ref 21–28)
HCT VFR BLD AUTO: 23.3 % (ref 35–47)
HCT VFR BLD AUTO: 29.9 % (ref 35–47)
HCT VFR BLD CALC: 29 %PCV (ref 35–47)
HGB BLD CALC-MCNC: 9.9 G/DL (ref 11.7–15.7)
HGB BLD-MCNC: 10 G/DL (ref 11.7–15.7)
HGB BLD-MCNC: 7.4 G/DL (ref 11.7–15.7)
HGB UR QL STRIP: ABNORMAL
INR PPP: 2.11 (ref 0.86–1.14)
INR PPP: 2.89 (ref 0.86–1.14)
INR PPP: 3.67 (ref 0.86–1.14)
INTERPRETATION ECG - MUSE: NORMAL
KETONES UR STRIP-MCNC: NEGATIVE MG/DL
LACTATE BLD-SCNC: 11.1 MMOL/L (ref 0.7–2.1)
LACTATE BLD-SCNC: 11.6 MMOL/L (ref 0.7–2)
LACTATE BLD-SCNC: 12.6 MMOL/L (ref 0.7–2)
LACTATE BLD-SCNC: 13.4 MMOL/L (ref 0.7–2.1)
LACTATE BLD-SCNC: 21 MMOL/L (ref 0.7–2)
LEUKOCYTE ESTERASE UR QL STRIP: NEGATIVE
LIPASE SERPL-CCNC: 39 U/L (ref 73–393)
LYMPHOCYTES # BLD AUTO: 1.3 10E9/L (ref 0.8–5.3)
LYMPHOCYTES NFR BLD AUTO: 20 %
MAGNESIUM SERPL-MCNC: 1.6 MG/DL (ref 1.6–2.3)
MCH RBC QN AUTO: 34.9 PG (ref 26.5–33)
MCH RBC QN AUTO: 35.1 PG (ref 26.5–33)
MCHC RBC AUTO-ENTMCNC: 31.8 G/DL (ref 31.5–36.5)
MCHC RBC AUTO-ENTMCNC: 33.4 G/DL (ref 31.5–36.5)
MCV RBC AUTO: 105 FL (ref 78–100)
MCV RBC AUTO: 110 FL (ref 78–100)
METAMYELOCYTES # BLD: 0.3 10E9/L
METAMYELOCYTES NFR BLD MANUAL: 4 %
MONOCYTES # BLD AUTO: 0.1 10E9/L (ref 0–1.3)
MONOCYTES NFR BLD AUTO: 1 %
MRSA DNA SPEC QL NAA+PROBE: ABNORMAL
MRSA DNA SPEC QL NAA+PROBE: ABNORMAL
MUCOUS THREADS #/AREA URNS LPF: PRESENT /LPF
NEUTROPHILS # BLD AUTO: 4.7 10E9/L (ref 1.6–8.3)
NEUTROPHILS NFR BLD AUTO: 70 %
NEUTS BAND # BLD AUTO: 0.2 10E9/L (ref 0–0.6)
NEUTS BAND NFR BLD MANUAL: 3 %
NEUTS HYPERSEG BLD QL SMEAR: PRESENT
NITRATE UR QL: NEGATIVE
NRBC # BLD AUTO: 0.4 10*3/UL
NRBC BLD AUTO-RTO: 6 /100
NT-PROBNP SERPL-MCNC: ABNORMAL PG/ML (ref 0–900)
NUM BPU REQUESTED: 2
O2/TOTAL GAS SETTING VFR VENT: ABNORMAL %
OXYHGB MFR BLD: 79 % (ref 92–100)
OXYHGB MFR BLD: 90 % (ref 92–100)
OXYHGB MFR BLD: 93 % (ref 92–100)
OXYHGB MFR BLD: 93 % (ref 92–100)
OXYHGB MFR BLD: 96 % (ref 92–100)
OXYHGB MFR BLDV: 76 %
PCO2 BLD: 32 MM HG (ref 35–45)
PCO2 BLD: 33 MM HG (ref 35–45)
PCO2 BLD: 35 MM HG (ref 35–45)
PCO2 BLD: 38 MM HG (ref 35–45)
PCO2 BLD: 60 MM HG (ref 35–45)
PCO2 BLDV: 38 MM HG (ref 40–50)
PCO2 BLDV: 39 MM HG (ref 40–50)
PCO2 BLDV: 46 MM HG (ref 40–50)
PH BLD: 7.03 PH (ref 7.35–7.45)
PH BLD: 7.16 PH (ref 7.35–7.45)
PH BLD: 7.24 PH (ref 7.35–7.45)
PH BLD: 7.24 PH (ref 7.35–7.45)
PH BLD: 7.28 PH (ref 7.35–7.45)
PH BLDV: 7.18 PH (ref 7.32–7.43)
PH BLDV: 7.2 PH (ref 7.32–7.43)
PH BLDV: 7.28 PH (ref 7.32–7.43)
PH UR STRIP: 6 PH (ref 5–7)
PHOSPHATE SERPL-MCNC: 6.4 MG/DL (ref 2.5–4.5)
PLATELET # BLD AUTO: 173 10E9/L (ref 150–450)
PLATELET # BLD AUTO: 90 10E9/L (ref 150–450)
PLATELET # BLD EST: ABNORMAL 10*3/UL
PO2 BLD: 103 MM HG (ref 80–105)
PO2 BLD: 62 MM HG (ref 80–105)
PO2 BLD: 73 MM HG (ref 80–105)
PO2 BLD: 80 MM HG (ref 80–105)
PO2 BLD: 85 MM HG (ref 80–105)
PO2 BLDV: 33 MM HG (ref 25–47)
PO2 BLDV: 51 MM HG (ref 25–47)
PO2 BLDV: 52 MM HG (ref 25–47)
POIKILOCYTOSIS BLD QL SMEAR: ABNORMAL
POTASSIUM BLD-SCNC: 3.4 MMOL/L (ref 3.4–5.3)
POTASSIUM SERPL-SCNC: 3.4 MMOL/L (ref 3.4–5.3)
POTASSIUM SERPL-SCNC: 3.8 MMOL/L (ref 3.4–5.3)
POTASSIUM SERPL-SCNC: 4.9 MMOL/L (ref 3.4–5.3)
PROCALCITONIN SERPL-MCNC: 137.94 NG/ML
PROT SERPL-MCNC: 3.6 G/DL (ref 6.8–8.8)
PROT SERPL-MCNC: 3.7 G/DL (ref 6.8–8.8)
PROT SERPL-MCNC: 4.2 G/DL (ref 6.8–8.8)
RBC # BLD AUTO: 2.12 10E12/L (ref 3.8–5.2)
RBC # BLD AUTO: 2.85 10E12/L (ref 3.8–5.2)
RBC #/AREA URNS AUTO: 42 /HPF (ref 0–2)
RSV RNA SPEC NAA+PROBE: NEGATIVE
SAO2 % BLDV FROM PO2: 55 %
SAO2 % BLDV FROM PO2: 76 %
SODIUM BLD-SCNC: 124 MMOL/L (ref 133–144)
SODIUM SERPL-SCNC: 125 MMOL/L (ref 133–144)
SODIUM SERPL-SCNC: 126 MMOL/L (ref 133–144)
SODIUM SERPL-SCNC: 129 MMOL/L (ref 133–144)
SOURCE: ABNORMAL
SP GR UR STRIP: 1.01 (ref 1–1.03)
SPECIMEN EXP DATE BLD: NORMAL
SPECIMEN SOURCE: ABNORMAL
SPECIMEN SOURCE: ABNORMAL
SPECIMEN SOURCE: NORMAL
SQUAMOUS #/AREA URNS AUTO: 10 /HPF (ref 0–1)
TOXIC GRANULES BLD QL SMEAR: PRESENT
TRANSFUSION STATUS PATIENT QL: NORMAL
TROPONIN I SERPL-MCNC: 0.6 UG/L (ref 0–0.04)
TROPONIN I SERPL-MCNC: 1.02 UG/L (ref 0–0.04)
UROBILINOGEN UR STRIP-MCNC: 0 MG/DL (ref 0–2)
WBC # BLD AUTO: 22.9 10E9/L (ref 4–11)
WBC # BLD AUTO: 6.7 10E9/L (ref 4–11)
WBC #/AREA URNS AUTO: 5 /HPF (ref 0–2)

## 2018-01-01 PROCEDURE — 99292 CRITICAL CARE ADDL 30 MIN: CPT

## 2018-01-01 PROCEDURE — 36600 WITHDRAWAL OF ARTERIAL BLOOD: CPT

## 2018-01-01 PROCEDURE — 25800025 ZZH RX 258: Performed by: EMERGENCY MEDICINE

## 2018-01-01 PROCEDURE — 00000146 ZZHCL STATISTIC GLUCOSE BY METER IP

## 2018-01-01 PROCEDURE — 25000128 H RX IP 250 OP 636: Performed by: EMERGENCY MEDICINE

## 2018-01-01 PROCEDURE — 25000128 H RX IP 250 OP 636: Performed by: SURGERY

## 2018-01-01 PROCEDURE — 25000128 H RX IP 250 OP 636: Performed by: INTERNAL MEDICINE

## 2018-01-01 PROCEDURE — 40000344 ZZHCL STATISTIC THAWING COMPONENT: Performed by: SURGERY

## 2018-01-01 PROCEDURE — 85027 COMPLETE CBC AUTOMATED: CPT | Performed by: INTERNAL MEDICINE

## 2018-01-01 PROCEDURE — 83605 ASSAY OF LACTIC ACID: CPT | Performed by: INTERNAL MEDICINE

## 2018-01-01 PROCEDURE — P9047 ALBUMIN (HUMAN), 25%, 50ML: HCPCS

## 2018-01-01 PROCEDURE — 82140 ASSAY OF AMMONIA: CPT | Performed by: INTERNAL MEDICINE

## 2018-01-01 PROCEDURE — 25800025 ZZH RX 258: Performed by: SURGERY

## 2018-01-01 PROCEDURE — 74177 CT ABD & PELVIS W/CONTRAST: CPT

## 2018-01-01 PROCEDURE — 27210534 ZZH WARMER FLUID BLOOD SUPPLIES

## 2018-01-01 PROCEDURE — 36556 INSERT NON-TUNNEL CV CATH: CPT

## 2018-01-01 PROCEDURE — 0F9430Z DRAINAGE OF GALLBLADDER WITH DRAINAGE DEVICE, PERCUTANEOUS APPROACH: ICD-10-PCS | Performed by: RADIOLOGY

## 2018-01-01 PROCEDURE — 85025 COMPLETE CBC W/AUTO DIFF WBC: CPT | Performed by: EMERGENCY MEDICINE

## 2018-01-01 PROCEDURE — 25000125 ZZHC RX 250: Performed by: RADIOLOGY

## 2018-01-01 PROCEDURE — 94640 AIRWAY INHALATION TREATMENT: CPT

## 2018-01-01 PROCEDURE — 25000131 ZZH RX MED GY IP 250 OP 636 PS 637: Performed by: SURGERY

## 2018-01-01 PROCEDURE — 83735 ASSAY OF MAGNESIUM: CPT | Performed by: INTERNAL MEDICINE

## 2018-01-01 PROCEDURE — 40000986 XR CHEST PORT 1 VW

## 2018-01-01 PROCEDURE — 87076 CULTURE ANAEROBE IDENT EACH: CPT | Performed by: EMERGENCY MEDICINE

## 2018-01-01 PROCEDURE — 96375 TX/PRO/DX INJ NEW DRUG ADDON: CPT

## 2018-01-01 PROCEDURE — 80053 COMPREHEN METABOLIC PANEL: CPT | Performed by: INTERNAL MEDICINE

## 2018-01-01 PROCEDURE — 25000128 H RX IP 250 OP 636

## 2018-01-01 PROCEDURE — 83690 ASSAY OF LIPASE: CPT | Performed by: SURGERY

## 2018-01-01 PROCEDURE — 80047 BASIC METABLC PNL IONIZED CA: CPT

## 2018-01-01 PROCEDURE — 94644 CONT INHLJ TX 1ST HOUR: CPT

## 2018-01-01 PROCEDURE — 86901 BLOOD TYPING SEROLOGIC RH(D): CPT | Performed by: INTERNAL MEDICINE

## 2018-01-01 PROCEDURE — 99024 POSTOP FOLLOW-UP VISIT: CPT | Performed by: PHYSICIAN ASSISTANT

## 2018-01-01 PROCEDURE — 96376 TX/PRO/DX INJ SAME DRUG ADON: CPT

## 2018-01-01 PROCEDURE — 87081 CULTURE SCREEN ONLY: CPT | Performed by: INTERNAL MEDICINE

## 2018-01-01 PROCEDURE — 87181 SC STD AGAR DILUTION PER AGT: CPT | Performed by: EMERGENCY MEDICINE

## 2018-01-01 PROCEDURE — 5A1935Z RESPIRATORY VENTILATION, LESS THAN 24 CONSECUTIVE HOURS: ICD-10-PCS | Performed by: EMERGENCY MEDICINE

## 2018-01-01 PROCEDURE — 87040 BLOOD CULTURE FOR BACTERIA: CPT | Performed by: EMERGENCY MEDICINE

## 2018-01-01 PROCEDURE — 82805 BLOOD GASES W/O2 SATURATION: CPT | Performed by: INTERNAL MEDICINE

## 2018-01-01 PROCEDURE — 82330 ASSAY OF CALCIUM: CPT | Performed by: INTERNAL MEDICINE

## 2018-01-01 PROCEDURE — 25000132 ZZH RX MED GY IP 250 OP 250 PS 637: Performed by: INTERNAL MEDICINE

## 2018-01-01 PROCEDURE — 27210190 US BILIARY DRAIN

## 2018-01-01 PROCEDURE — 40000275 ZZH STATISTIC RCP TIME EA 10 MIN

## 2018-01-01 PROCEDURE — 27210995 ZZH RX 272: Performed by: SURGERY

## 2018-01-01 PROCEDURE — 87631 RESP VIRUS 3-5 TARGETS: CPT | Performed by: INTERNAL MEDICINE

## 2018-01-01 PROCEDURE — 82805 BLOOD GASES W/O2 SATURATION: CPT | Performed by: EMERGENCY MEDICINE

## 2018-01-01 PROCEDURE — 96365 THER/PROPH/DIAG IV INF INIT: CPT

## 2018-01-01 PROCEDURE — 85014 HEMATOCRIT: CPT

## 2018-01-01 PROCEDURE — 87086 URINE CULTURE/COLONY COUNT: CPT | Performed by: INTERNAL MEDICINE

## 2018-01-01 PROCEDURE — 27210131 ZZH KIT ART LINE INSERTION

## 2018-01-01 PROCEDURE — 80053 COMPREHEN METABOLIC PANEL: CPT | Performed by: EMERGENCY MEDICINE

## 2018-01-01 PROCEDURE — 94002 VENT MGMT INPAT INIT DAY: CPT

## 2018-01-01 PROCEDURE — 40000281 ZZH STATISTIC TRANSPORT TIME EA 15 MIN

## 2018-01-01 PROCEDURE — 85730 THROMBOPLASTIN TIME PARTIAL: CPT | Performed by: EMERGENCY MEDICINE

## 2018-01-01 PROCEDURE — 25000125 ZZHC RX 250: Performed by: SURGERY

## 2018-01-01 PROCEDURE — 3E043XZ INTRODUCTION OF VASOPRESSOR INTO CENTRAL VEIN, PERCUTANEOUS APPROACH: ICD-10-PCS | Performed by: EMERGENCY MEDICINE

## 2018-01-01 PROCEDURE — 83605 ASSAY OF LACTIC ACID: CPT | Performed by: EMERGENCY MEDICINE

## 2018-01-01 PROCEDURE — 84100 ASSAY OF PHOSPHORUS: CPT | Performed by: INTERNAL MEDICINE

## 2018-01-01 PROCEDURE — 85610 PROTHROMBIN TIME: CPT | Performed by: EMERGENCY MEDICINE

## 2018-01-01 PROCEDURE — 70450 CT HEAD/BRAIN W/O DYE: CPT

## 2018-01-01 PROCEDURE — 87186 SC STD MICRODIL/AGAR DIL: CPT | Performed by: EMERGENCY MEDICINE

## 2018-01-01 PROCEDURE — 96366 THER/PROPH/DIAG IV INF ADDON: CPT

## 2018-01-01 PROCEDURE — 87641 MR-STAPH DNA AMP PROBE: CPT | Performed by: INTERNAL MEDICINE

## 2018-01-01 PROCEDURE — 99291 CRITICAL CARE FIRST HOUR: CPT | Mod: 25

## 2018-01-01 PROCEDURE — 27211040 ZZH CONTINUOUS NEBULIZER MICRO PUMP

## 2018-01-01 PROCEDURE — 82803 BLOOD GASES ANY COMBINATION: CPT

## 2018-01-01 PROCEDURE — 83880 ASSAY OF NATRIURETIC PEPTIDE: CPT | Performed by: EMERGENCY MEDICINE

## 2018-01-01 PROCEDURE — 84484 ASSAY OF TROPONIN QUANT: CPT | Performed by: EMERGENCY MEDICINE

## 2018-01-01 PROCEDURE — 85384 FIBRINOGEN ACTIVITY: CPT | Performed by: INTERNAL MEDICINE

## 2018-01-01 PROCEDURE — P9047 ALBUMIN (HUMAN), 25%, 50ML: HCPCS | Performed by: INTERNAL MEDICINE

## 2018-01-01 PROCEDURE — 99292 CRITICAL CARE ADDL 30 MIN: CPT | Performed by: SURGERY

## 2018-01-01 PROCEDURE — 93308 TTE F-UP OR LMTD: CPT

## 2018-01-01 PROCEDURE — P9059 PLASMA, FRZ BETWEEN 8-24HOUR: HCPCS | Performed by: SURGERY

## 2018-01-01 PROCEDURE — 87640 STAPH A DNA AMP PROBE: CPT | Performed by: INTERNAL MEDICINE

## 2018-01-01 PROCEDURE — 99223 1ST HOSP IP/OBS HIGH 75: CPT | Mod: AI | Performed by: INTERNAL MEDICINE

## 2018-01-01 PROCEDURE — 76705 ECHO EXAM OF ABDOMEN: CPT

## 2018-01-01 PROCEDURE — 20000003 ZZH R&B ICU

## 2018-01-01 PROCEDURE — 83605 ASSAY OF LACTIC ACID: CPT

## 2018-01-01 PROCEDURE — 36620 INSERTION CATHETER ARTERY: CPT

## 2018-01-01 PROCEDURE — 82150 ASSAY OF AMYLASE: CPT | Performed by: SURGERY

## 2018-01-01 PROCEDURE — 93005 ELECTROCARDIOGRAM TRACING: CPT

## 2018-01-01 PROCEDURE — 86900 BLOOD TYPING SEROLOGIC ABO: CPT | Performed by: INTERNAL MEDICINE

## 2018-01-01 PROCEDURE — 81001 URINALYSIS AUTO W/SCOPE: CPT | Performed by: EMERGENCY MEDICINE

## 2018-01-01 PROCEDURE — 82805 BLOOD GASES W/O2 SATURATION: CPT | Performed by: SURGERY

## 2018-01-01 PROCEDURE — 94003 VENT MGMT INPAT SUBQ DAY: CPT

## 2018-01-01 PROCEDURE — 31500 INSERT EMERGENCY AIRWAY: CPT

## 2018-01-01 PROCEDURE — 27210995 ZZH RX 272: Performed by: INTERNAL MEDICINE

## 2018-01-01 PROCEDURE — 40000809 ZZH STATISTIC NO DOCUMENTATION TO SUPPORT CHARGE

## 2018-01-01 PROCEDURE — 87077 CULTURE AEROBIC IDENTIFY: CPT | Performed by: EMERGENCY MEDICINE

## 2018-01-01 PROCEDURE — 25000125 ZZHC RX 250: Performed by: EMERGENCY MEDICINE

## 2018-01-01 PROCEDURE — 51701 INSERT BLADDER CATHETER: CPT

## 2018-01-01 PROCEDURE — 85610 PROTHROMBIN TIME: CPT | Performed by: INTERNAL MEDICINE

## 2018-01-01 PROCEDURE — 87186 SC STD MICRODIL/AGAR DIL: CPT | Performed by: INTERNAL MEDICINE

## 2018-01-01 PROCEDURE — 99291 CRITICAL CARE FIRST HOUR: CPT | Performed by: SURGERY

## 2018-01-01 RX ORDER — OXYCODONE AND ACETAMINOPHEN 5; 325 MG/1; MG/1
1 TABLET ORAL EVERY 4 HOURS PRN
Qty: 30 TABLET | Refills: 0 | Status: SHIPPED | OUTPATIENT
Start: 2018-01-01

## 2018-01-01 RX ORDER — DEXTROSE MONOHYDRATE 25 G/50ML
50 INJECTION, SOLUTION INTRAVENOUS ONCE
Status: DISCONTINUED | OUTPATIENT
Start: 2018-01-01 | End: 2018-01-01

## 2018-01-01 RX ORDER — NOREPINEPHRINE BITARTRATE/D5W 16MG/250ML
.03-.4 PLASTIC BAG, INJECTION (ML) INTRAVENOUS CONTINUOUS
Status: DISCONTINUED | OUTPATIENT
Start: 2018-01-01 | End: 2018-01-01 | Stop reason: HOSPADM

## 2018-01-01 RX ORDER — DEXTROSE MONOHYDRATE 100 MG/ML
INJECTION, SOLUTION INTRAVENOUS CONTINUOUS
Status: DISCONTINUED | OUTPATIENT
Start: 2018-01-01 | End: 2018-01-01 | Stop reason: HOSPADM

## 2018-01-01 RX ORDER — ALBUMIN (HUMAN) 12.5 G/50ML
SOLUTION INTRAVENOUS
Status: DISCONTINUED
Start: 2018-01-01 | End: 2018-01-01 | Stop reason: HOSPADM

## 2018-01-01 RX ORDER — PROPOFOL 10 MG/ML
5-75 INJECTION, EMULSION INTRAVENOUS CONTINUOUS
Status: DISCONTINUED | OUTPATIENT
Start: 2018-01-01 | End: 2018-01-01 | Stop reason: HOSPADM

## 2018-01-01 RX ORDER — MIDAZOLAM (PF) 1 MG/ML IN 0.9 % SODIUM CHLORIDE INTRAVENOUS SOLUTION
1-8 CONTINUOUS
Status: DISCONTINUED | OUTPATIENT
Start: 2018-01-01 | End: 2018-01-01 | Stop reason: HOSPADM

## 2018-01-01 RX ORDER — OSELTAMIVIR PHOSPHATE 6 MG/ML
30 FOR SUSPENSION ORAL ONCE
Status: COMPLETED | OUTPATIENT
Start: 2018-01-01 | End: 2018-01-01

## 2018-01-01 RX ORDER — ALBUMIN (HUMAN) 12.5 G/50ML
50 SOLUTION INTRAVENOUS ONCE
Status: COMPLETED | OUTPATIENT
Start: 2018-01-01 | End: 2018-01-01

## 2018-01-01 RX ORDER — HYDROMORPHONE HYDROCHLORIDE 1 MG/ML
.3-.5 INJECTION, SOLUTION INTRAMUSCULAR; INTRAVENOUS; SUBCUTANEOUS
Status: DISCONTINUED | OUTPATIENT
Start: 2018-01-01 | End: 2018-01-01 | Stop reason: HOSPADM

## 2018-01-01 RX ORDER — FENTANYL CITRATE 50 UG/ML
50 INJECTION, SOLUTION INTRAMUSCULAR; INTRAVENOUS ONCE
Status: COMPLETED | OUTPATIENT
Start: 2018-01-01 | End: 2018-01-01

## 2018-01-01 RX ORDER — MEROPENEM 1 G/1
1 INJECTION, POWDER, FOR SOLUTION INTRAVENOUS ONCE
Status: COMPLETED | OUTPATIENT
Start: 2018-01-01 | End: 2018-01-01

## 2018-01-01 RX ORDER — CALCIUM CHLORIDE 100 MG/ML
1 INJECTION INTRAVENOUS; INTRAVENTRICULAR ONCE
Status: COMPLETED | OUTPATIENT
Start: 2018-01-01 | End: 2018-01-01

## 2018-01-01 RX ORDER — ALBUMIN (HUMAN) 12.5 G/50ML
SOLUTION INTRAVENOUS
Status: COMPLETED
Start: 2018-01-01 | End: 2018-01-01

## 2018-01-01 RX ORDER — NICOTINE POLACRILEX 4 MG
15-30 LOZENGE BUCCAL
Status: DISCONTINUED | OUTPATIENT
Start: 2018-01-01 | End: 2018-01-01 | Stop reason: HOSPADM

## 2018-01-01 RX ORDER — BISACODYL 10 MG
10 SUPPOSITORY, RECTAL RECTAL DAILY PRN
Status: DISCONTINUED | OUTPATIENT
Start: 2018-01-01 | End: 2018-01-01 | Stop reason: HOSPADM

## 2018-01-01 RX ORDER — PROCHLORPERAZINE 25 MG
25 SUPPOSITORY, RECTAL RECTAL EVERY 12 HOURS PRN
Status: DISCONTINUED | OUTPATIENT
Start: 2018-01-01 | End: 2018-01-01 | Stop reason: HOSPADM

## 2018-01-01 RX ORDER — ETOMIDATE 2 MG/ML
25 INJECTION INTRAVENOUS ONCE
Status: COMPLETED | OUTPATIENT
Start: 2018-01-01 | End: 2018-01-01

## 2018-01-01 RX ORDER — CHLORHEXIDINE GLUCONATE ORAL RINSE 1.2 MG/ML
15 SOLUTION DENTAL EVERY 12 HOURS
Status: DISCONTINUED | OUTPATIENT
Start: 2018-01-01 | End: 2018-01-01 | Stop reason: HOSPADM

## 2018-01-01 RX ORDER — ETOMIDATE 2 MG/ML
25 INJECTION INTRAVENOUS ONCE
Status: DISCONTINUED | OUTPATIENT
Start: 2018-01-01 | End: 2018-01-01

## 2018-01-01 RX ORDER — ACETAMINOPHEN 650 MG/1
650 SUPPOSITORY RECTAL EVERY 4 HOURS PRN
Status: DISCONTINUED | OUTPATIENT
Start: 2018-01-01 | End: 2018-01-01 | Stop reason: HOSPADM

## 2018-01-01 RX ORDER — AMOXICILLIN 250 MG
2 CAPSULE ORAL 2 TIMES DAILY PRN
Status: DISCONTINUED | OUTPATIENT
Start: 2018-01-01 | End: 2018-01-01 | Stop reason: HOSPADM

## 2018-01-01 RX ORDER — NALOXONE HYDROCHLORIDE 0.4 MG/ML
.1-.4 INJECTION, SOLUTION INTRAMUSCULAR; INTRAVENOUS; SUBCUTANEOUS
Status: DISCONTINUED | OUTPATIENT
Start: 2018-01-01 | End: 2018-01-01

## 2018-01-01 RX ORDER — EPOPROSTENOL SODIUM 0.5 MG/1
INJECTION, POWDER, LYOPHILIZED, FOR SOLUTION INTRAVENOUS EVERY 8 HOURS
Status: DISCONTINUED | OUTPATIENT
Start: 2018-01-01 | End: 2018-01-01 | Stop reason: HOSPADM

## 2018-01-01 RX ORDER — ACETAMINOPHEN 325 MG/1
650 TABLET ORAL EVERY 4 HOURS PRN
Status: DISCONTINUED | OUTPATIENT
Start: 2018-01-01 | End: 2018-01-01 | Stop reason: HOSPADM

## 2018-01-01 RX ORDER — ONDANSETRON 2 MG/ML
INJECTION INTRAMUSCULAR; INTRAVENOUS
Status: DISCONTINUED
Start: 2018-01-01 | End: 2018-01-01 | Stop reason: CLARIF

## 2018-01-01 RX ORDER — AMOXICILLIN 250 MG
1 CAPSULE ORAL 2 TIMES DAILY PRN
Status: DISCONTINUED | OUTPATIENT
Start: 2018-01-01 | End: 2018-01-01 | Stop reason: HOSPADM

## 2018-01-01 RX ORDER — IPRATROPIUM BROMIDE AND ALBUTEROL SULFATE 2.5; .5 MG/3ML; MG/3ML
3 SOLUTION RESPIRATORY (INHALATION) EVERY 4 HOURS
Status: DISCONTINUED | OUTPATIENT
Start: 2018-01-01 | End: 2018-01-01 | Stop reason: HOSPADM

## 2018-01-01 RX ORDER — SODIUM CHLORIDE 9 MG/ML
INJECTION, SOLUTION INTRAVENOUS CONTINUOUS
Status: DISCONTINUED | OUTPATIENT
Start: 2018-01-01 | End: 2018-01-01 | Stop reason: HOSPADM

## 2018-01-01 RX ORDER — MEROPENEM 500 MG/1
500 INJECTION, POWDER, FOR SOLUTION INTRAVENOUS EVERY 8 HOURS
Status: DISCONTINUED | OUTPATIENT
Start: 2018-01-01 | End: 2018-01-01

## 2018-01-01 RX ORDER — IOPAMIDOL 755 MG/ML
500 INJECTION, SOLUTION INTRAVASCULAR ONCE
Status: COMPLETED | OUTPATIENT
Start: 2018-01-01 | End: 2018-01-01

## 2018-01-01 RX ORDER — MEROPENEM 500 MG/1
500 INJECTION, POWDER, FOR SOLUTION INTRAVENOUS EVERY 12 HOURS
Status: DISCONTINUED | OUTPATIENT
Start: 2018-01-01 | End: 2018-01-01 | Stop reason: HOSPADM

## 2018-01-01 RX ORDER — OSELTAMIVIR PHOSPHATE 6 MG/ML
30 FOR SUSPENSION ORAL DAILY
Status: DISCONTINUED | OUTPATIENT
Start: 2018-01-06 | End: 2018-01-01 | Stop reason: HOSPADM

## 2018-01-01 RX ORDER — PROCHLORPERAZINE MALEATE 5 MG
10 TABLET ORAL EVERY 6 HOURS PRN
Status: DISCONTINUED | OUTPATIENT
Start: 2018-01-01 | End: 2018-01-01 | Stop reason: HOSPADM

## 2018-01-01 RX ORDER — NALOXONE HYDROCHLORIDE 0.4 MG/ML
.1-.4 INJECTION, SOLUTION INTRAMUSCULAR; INTRAVENOUS; SUBCUTANEOUS
Status: DISCONTINUED | OUTPATIENT
Start: 2018-01-01 | End: 2018-01-01 | Stop reason: HOSPADM

## 2018-01-01 RX ORDER — THIAMINE HYDROCHLORIDE 100 MG/ML
100 INJECTION, SOLUTION INTRAMUSCULAR; INTRAVENOUS DAILY
Status: DISCONTINUED | OUTPATIENT
Start: 2018-01-01 | End: 2018-01-01 | Stop reason: HOSPADM

## 2018-01-01 RX ORDER — DEXTROSE MONOHYDRATE 25 G/50ML
25-50 INJECTION, SOLUTION INTRAVENOUS
Status: DISCONTINUED | OUTPATIENT
Start: 2018-01-01 | End: 2018-01-01 | Stop reason: HOSPADM

## 2018-01-01 RX ORDER — SODIUM CHLORIDE, SODIUM LACTATE, POTASSIUM CHLORIDE, CALCIUM CHLORIDE 600; 310; 30; 20 MG/100ML; MG/100ML; MG/100ML; MG/100ML
INJECTION, SOLUTION INTRAVENOUS CONTINUOUS
Status: DISCONTINUED | OUTPATIENT
Start: 2018-01-01 | End: 2018-01-01 | Stop reason: HOSPADM

## 2018-01-01 RX ADMIN — MIDAZOLAM 1 MG: 1 INJECTION INTRAMUSCULAR; INTRAVENOUS at 22:42

## 2018-01-01 RX ADMIN — Medication 0.3 MCG/KG/MIN: at 22:44

## 2018-01-01 RX ADMIN — ALBUMIN (HUMAN): 12.5 SOLUTION INTRAVENOUS at 04:34

## 2018-01-01 RX ADMIN — ALBUMIN HUMAN 50 G: 0.25 SOLUTION INTRAVENOUS at 03:31

## 2018-01-01 RX ADMIN — SODIUM CHLORIDE 84 ML: 9 INJECTION, SOLUTION INTRAVENOUS at 23:32

## 2018-01-01 RX ADMIN — SODIUM BICARBONATE 50 MEQ: 84 INJECTION, SOLUTION INTRAVENOUS at 05:22

## 2018-01-01 RX ADMIN — SODIUM BICARBONATE: 84 INJECTION, SOLUTION INTRAVENOUS at 04:12

## 2018-01-01 RX ADMIN — MEROPENEM 500 MG: 500 INJECTION, POWDER, FOR SOLUTION INTRAVENOUS at 05:12

## 2018-01-01 RX ADMIN — DEXTROSE MONOHYDRATE 50 ML: 25 INJECTION, SOLUTION INTRAVENOUS at 05:56

## 2018-01-01 RX ADMIN — SODIUM BICARBONATE 50 MEQ: 84 INJECTION, SOLUTION INTRAVENOUS at 03:35

## 2018-01-01 RX ADMIN — VANCOMYCIN HYDROCHLORIDE 2000 MG: 1 INJECTION, POWDER, LYOPHILIZED, FOR SOLUTION INTRAVENOUS at 00:02

## 2018-01-01 RX ADMIN — CALCIUM CHLORIDE 1 G: 100 INJECTION INTRAVENOUS; INTRAVENTRICULAR at 05:28

## 2018-01-01 RX ADMIN — SODIUM CHLORIDE: 9 INJECTION, SOLUTION INTRAVENOUS at 02:49

## 2018-01-01 RX ADMIN — PHENYLEPHRINE HYDROCHLORIDE 0.5 MCG/KG/MIN: 10 INJECTION INTRAVENOUS at 00:02

## 2018-01-01 RX ADMIN — MIDAZOLAM 1 MG: 1 INJECTION INTRAMUSCULAR; INTRAVENOUS at 23:31

## 2018-01-01 RX ADMIN — ETOMIDATE 25 MG: 20 INJECTION, SOLUTION INTRAVENOUS at 22:13

## 2018-01-01 RX ADMIN — SODIUM CHLORIDE, POTASSIUM CHLORIDE, SODIUM LACTATE AND CALCIUM CHLORIDE 2000 ML: 600; 310; 30; 20 INJECTION, SOLUTION INTRAVENOUS at 22:12

## 2018-01-01 RX ADMIN — SODIUM CHLORIDE, POTASSIUM CHLORIDE, SODIUM LACTATE AND CALCIUM CHLORIDE 2000 ML: 600; 310; 30; 20 INJECTION, SOLUTION INTRAVENOUS at 22:05

## 2018-01-01 RX ADMIN — SODIUM CHLORIDE 1.5 UNITS/HR: 9 INJECTION, SOLUTION INTRAVENOUS at 05:00

## 2018-01-01 RX ADMIN — HYDROCORTISONE SODIUM SUCCINATE 100 MG: 100 INJECTION, POWDER, FOR SOLUTION INTRAMUSCULAR; INTRAVENOUS at 22:50

## 2018-01-01 RX ADMIN — WATER 20 NG/KG/MIN: 1 SOLUTION INTRAVENOUS at 05:01

## 2018-01-01 RX ADMIN — IOPAMIDOL 71 ML: 755 INJECTION, SOLUTION INTRAVENOUS at 23:32

## 2018-01-01 RX ADMIN — FENTANYL CITRATE 50 MCG: 50 INJECTION INTRAMUSCULAR; INTRAVENOUS at 22:28

## 2018-01-01 RX ADMIN — DEXTROSE AND SODIUM CHLORIDE: 5; 450 INJECTION, SOLUTION INTRAVENOUS at 00:46

## 2018-01-01 RX ADMIN — GENTAMICIN SULFATE 140 MG: 40 INJECTION, SOLUTION INTRAMUSCULAR; INTRAVENOUS at 05:12

## 2018-01-01 RX ADMIN — SODIUM BICARBONATE 50 MEQ: 84 INJECTION, SOLUTION INTRAVENOUS at 03:43

## 2018-01-01 RX ADMIN — MEROPENEM 1 G: 1 INJECTION, POWDER, FOR SOLUTION INTRAVENOUS at 23:09

## 2018-01-01 RX ADMIN — SODIUM BICARBONATE 50 MEQ: 84 INJECTION, SOLUTION INTRAVENOUS at 05:20

## 2018-01-01 RX ADMIN — CALCIUM CHLORIDE 1 G: 100 INJECTION INTRAVENOUS; INTRAVENTRICULAR at 03:35

## 2018-01-01 RX ADMIN — DEXTROSE MONOHYDRATE: 100 INJECTION, SOLUTION INTRAVENOUS at 04:30

## 2018-01-01 RX ADMIN — SODIUM BICARBONATE 50 MEQ: 84 INJECTION, SOLUTION INTRAVENOUS at 23:02

## 2018-01-01 RX ADMIN — Medication 0.25 MCG/KG/MIN: at 22:37

## 2018-01-01 RX ADMIN — DEXTROSE MONOHYDRATE 50 ML: 25 INJECTION, SOLUTION INTRAVENOUS at 04:58

## 2018-01-01 RX ADMIN — Medication 2 MG/HR: at 00:22

## 2018-01-01 RX ADMIN — EPOPROSTENOL SODIUM: 0.5 INJECTION, POWDER, LYOPHILIZED, FOR SOLUTION INTRAVENOUS at 05:01

## 2018-01-01 RX ADMIN — DEXTRAN 70, AND HYPROMELLOSE 2910 2 DROP: 1; 3 SOLUTION/ DROPS OPHTHALMIC at 04:00

## 2018-01-01 RX ADMIN — EPINEPHRINE 0.03 MCG/KG/MIN: 1 INJECTION INTRAMUSCULAR; INTRAVENOUS; SUBCUTANEOUS at 03:40

## 2018-01-01 RX ADMIN — MIDAZOLAM 1 MG: 1 INJECTION INTRAMUSCULAR; INTRAVENOUS at 02:05

## 2018-01-01 RX ADMIN — LIDOCAINE HYDROCHLORIDE 10 ML: 10 INJECTION, SOLUTION INFILTRATION; PERINEURAL at 06:08

## 2018-01-01 RX ADMIN — MIDAZOLAM 1 MG: 1 INJECTION INTRAMUSCULAR; INTRAVENOUS at 00:27

## 2018-01-01 RX ADMIN — VASOPRESSIN 2.4 UNITS/HR: 20 INJECTION INTRAMUSCULAR; SUBCUTANEOUS at 03:09

## 2018-01-01 RX ADMIN — HYDROCORTISONE SODIUM SUCCINATE 50 MG: 100 INJECTION, POWDER, FOR SOLUTION INTRAMUSCULAR; INTRAVENOUS at 03:14

## 2018-01-01 RX ADMIN — FENTANYL CITRATE 50 MCG: 50 INJECTION INTRAMUSCULAR; INTRAVENOUS at 22:31

## 2018-01-01 RX ADMIN — DEXTROSE MONOHYDRATE 50 ML: 25 INJECTION, SOLUTION INTRAVENOUS at 04:27

## 2018-01-01 RX ADMIN — OSELTAMIVIR PHOSPHATE 30 MG: 6 POWDER, FOR SUSPENSION ORAL at 05:25

## 2018-01-01 RX ADMIN — Medication 0.2 MCG/KG/MIN: at 22:32

## 2018-01-01 RX ADMIN — SODIUM BICARBONATE 50 MEQ: 84 INJECTION, SOLUTION INTRAVENOUS at 04:07

## 2018-01-02 NOTE — MR AVS SNAPSHOT
After Visit Summary   1/2/2018    Stefany Vega    MRN: [id number]           Patient Information     Date Of Birth          1955        Visit Information        Provider Department      1/2/2018 9:40 AM Corky Fuentes PA-C [facility] ORTHOPEDIC SURGERY        Today's Diagnoses     Orthopedic aftercare    -  1    Post-op pain          Care Instructions        Pendulum exercises.  3x daily, remove sling, stand, straighten elbow, and lean forward.  Once you feel tension slowly move hanging hand in small circumference.      No active lifting of the shoulder, ie use gravity to move arm.     Use sling when up and moving for 4 weeks after surgery and then taper use of sling gradually.  You may use the arm at the elbow for 1-2 pounds.    Physical therapy should call to schedule.  [phone], you may start on or after 1/27/2018,    Follow up in 4 - 6 weeks.           Follow-ups after your visit        Follow-up notes from your care team     Return in about 5 weeks (around 2/6/2018).      Who to contact     If you have questions or need follow up information about today's clinic visit or your schedule please contact [facility] ORTHOPEDIC SURGERY directly at [phone].  Normal or non-critical lab and imaging results will be communicated to you by Minutizerhart, letter or phone within 4 business days after the clinic has received the results. If you do not hear from us within 7 days, please contact the clinic through [employer]Tech[employer]t or phone. If you have a critical or abnormal lab result, we will notify you by phone as soon as possible.  Submit refill requests through [employer] or call your pharmacy and they will forward the refill request to us. Please allow 3 business days for your refill to be completed.          Additional Information About Your Visit        [employer] Information     [employer] gives you secure access to your electronic health record. If you see a primary care provider, you can  also send messages to your care team and make appointments. If you have questions, please call your primary care clinic.  If you do not have a primary care provider, please call [phone] and they will assist you.        Care EveryWhere ID     This is your Care EveryWhere ID. This could be used by other organizations to access your [facility] medical records  [id number]        Your Vitals Were     Last Period                   09/21/2004            Blood Pressure from Last 3 Encounters:   12/22/17 126/78   12/09/17 124/78   11/24/17 130/82    Weight from Last 3 Encounters:   12/22/17 192 lb (87.1 kg)   12/09/17 192 lb 14.4 oz (87.5 kg)   11/24/17 195 lb (88.5 kg)              Today, you had the following     No orders found for display         Today's Medication Changes          These changes are accurate as of: 1/2/18 10:30 AM.  If you have any questions, ask your nurse or doctor.               Start taking these medicines.        Dose/Directions    oxyCODONE-acetaminophen 5-325 MG per tablet   Commonly known as:  PERCOCET   Used for:  Post-op pain        Dose:  1 tablet   Take 1 tablet by mouth every 4 hours as needed for pain maximum 6 tablet(s) per day   Quantity:  30 tablet   Refills:  0            Where to get your medicines      Some of these will need a paper prescription and others can be bought over the counter.  Ask your nurse if you have questions.     Bring a paper prescription for each of these medications     oxyCODONE-acetaminophen 5-325 MG per tablet                Primary Care Provider Office Phone # Fax #    Lisa Mcguire [phone]       [address] [place] [address]        Equal Access to Services     [facility]: Hadruy Mendez, waaayush venegas, qadebbie krishnan. So [facility] [phone].    ATENCIÓN: Si habla español, tiene a kenny disposición servicios gratuitos de asistencia lingüística.  Caitlin sanders [phone].    We comply with applicable federal civil rights laws and [place] laws. We do not discriminate on the basis of race, color, national origin, age, disability, sex, sexual orientation, or gender identity.            Thank you!     Thank you for choosing [facility] ORTHOPEDIC SURGERY  for your care. Our goal is always to provide you with excellent care. Hearing back from our patients is one way we can continue to improve our services. Please take a few minutes to complete the written survey that you may receive in the mail after your visit with us. Thank you!             Your Updated Medication List - Protect others around you: Learn how to safely use, store and throw away your medicines at www.disposemymeds.org.          This list is accurate as of: 1/2/18 10:30 AM.  Always use your most recent med list.                   Brand Name Dispense Instructions for use Diagnosis    acetaminophen-codeine 300-30 MG per tablet    TYLENOL WITH CODEINE #3    28 tablet    Take 1-2 tablets by mouth every 4 hours as needed for mild pain    Impingement syndrome of left shoulder, Supraspinatus tendon tear, left, initial encounter, Labral tear of long head of biceps tendon, left, initial encounter       acyclovir 800 MG tablet    ZOVIRAX    18 tablet    Take one tablet by mouth three times daily  for 2 days    HSV (herpes simplex virus) infection       amLODIPine 5 MG tablet    NORVASC    90 tablet    Take 1 tablet (5 mg) by mouth every evening    Essential hypertension with goal blood pressure less than 140/90       ANORO ELLIPTA 62.5-25 MCG/INH oral inhaler   Generic drug:  umeclidinium-vilanterol      Inhale 1 puff into the lungs daily        aspirin 81 MG tablet     30 tablet    Take 1 tablet (81 mg) by mouth daily    Essential thrombocythemia (H)       buPROPion 150 MG 12 hr tablet    ZYBAN     Take 150 mg by mouth 2 times daily        calcium 600 + D 600-400 MG-UNIT per tablet   Generic drug:   calcium-vitamin D     60 tablet    Take 1 tablet by mouth 2 times daily        cetirizine 10 MG tablet    zyrTEC     Take 10 mg by mouth daily        cyanocolbalamin 500 MCG tablet    vitamin  B-12     Take 500 mcg by mouth daily        fluticasone 50 MCG/ACT spray    FLONASE     Spray 1-2 sprays into both nostrils daily as needed for rhinitis or allergies        glucosamine-chondroitin 500-400 MG Caps per capsule      Take 1 capsule by mouth At Bedtime        hydrochlorothiazide 25 MG tablet    HYDRODIURIL    90 tablet    TAKE 1 TABLET BY MOUTH EVERY DAY IN THE MORNING    Essential hypertension with goal blood pressure less than 140/90       hydroxyurea 500 MG capsule CHEMO    HYDREA     Take by mouth daily        LOPRESSOR PO      Take 25 mg by mouth every morning        mometasone 0.1 % cream    ELOCON    45 g    APPLY TOPICALLY TO HANDS FOR UP TO 14 DAYS AT A TIME    Dermatitis       omeprazole 40 MG capsule    priLOSEC    180 capsule    TAKE ONE CAPSULE BY MOUTH TWICE DAILY    GERD (gastroesophageal reflux disease)       oxyCODONE-acetaminophen 5-325 MG per tablet    PERCOCET    30 tablet    Take 1 tablet by mouth every 4 hours as needed for pain maximum 6 tablet(s) per day    Post-op pain       potassium 99 MG Tabs      Take 1 tablet by mouth daily        sucralfate 1 GM tablet    CARAFATE     Take 1 g by mouth 4 times daily as needed        Vitamin D (Cholecalciferol) 1000 UNITS Tabs      Take 1 tablet by mouth daily

## 2018-01-02 NOTE — PROGRESS NOTES
HISTORY OF PRESENT ILLNESS:    Stefany Vega is a 62 year old female who is seen in follow up for Left shoulder arth DEC/DCR, open RCR for large tear, DOS 12/27/2017, Dr. Al.  Present symptoms: Still painful, taking 1 tab percocet every 6 hours.  Using sling.  Trying to sleep in bed but chair is better.    Denies Chest pain, Calve pain, Fever, Chills.    Current Treatment: Postop, sling, pain mediation.    PHYSICAL EXAM:  LMP 09/21/2004  There is no height or weight on file to calculate BMI.   GENERAL APPEARANCE: healthy, alert and no distress   PSYCH:  mentation appears normal and affect normal/bright    MSK:  Left: Shoulder .  Ambulates: WNG, presetns in sling..  Incision clean and dry, staples present, healing.  Appropriate incisional erythema.   Yes Ecchymosis mild resolving..  Edema min.  Axilla clean with no skin lesion.  CMS: bettye incisional numbness, otherwise grossly intact.  AROM elbow and hand WNL.       ASSESSMENT:  Stefany Vega is a 62 year old female S/P Left shoulder arth DEC/DCR, open RCR for large tear, DOS 12/27/2017, Dr. Al.    Healing incision, doing well.     PLAN:  - Surgery discussed, images reviewed if applicable, and all questions were answered at this time.  - staples removed with sterile technique, steri-strips applied in usual fashion, care instructions given and verbally acknowledged.  - Medications: refill oxycodone with instructions to taper.  - Physical therapy: new referral, start at 4 weeks post op.  - no active motion at shoulker and sling use 4 weeks postop.    Return to clinic 4, weeks    Corky Fuentes PA-C    Dept. Orthopedic Surgery  [facility]   1/2/2018

## 2018-01-02 NOTE — PATIENT INSTRUCTIONS
Pendulum exercises.  3x daily, remove sling, stand, straighten elbow, and lean forward.  Once you feel tension slowly move hanging hand in small circumference.      No active lifting of the shoulder, ie use gravity to move arm.     Use sling when up and moving for 4 weeks after surgery and then taper use of sling gradually.  You may use the arm at the elbow for 1-2 pounds.    Physical therapy should call to schedule.  [phone], you may start on or after 1/27/2018,    Follow up in 4 - 6 weeks.

## 2018-01-03 NOTE — TELEPHONE ENCOUNTER
FLMA completed, 6 weeks off.    Left with triage for fax and scan.  Corky Fuentes PA-C  [facility] - Surgery

## 2018-01-05 PROBLEM — A41.9 SEPTIC SHOCK (H): Status: ACTIVE | Noted: 2018-01-01

## 2018-01-05 PROBLEM — R65.21 SEPTIC SHOCK (H): Status: ACTIVE | Noted: 2018-01-01

## 2018-01-05 NOTE — ED PROVIDER NOTES
History     Chief Complaint:  Unresponsiveness     HPI   Stefany Vega is a 62 year old female with a history of HTN, Lupus, ischemic colitis, migraines, and breast cancer S/P mastectomy who presents to the emergency department for evaluation of unresponsiveness. Of note, the patient underwent a left rotator cuff repair procedure on 1/1/2018 without significant complication. Since then, she has been recovering at home, though has not been eating well and has been had generally somewhat fatigued and generally weak. Her daughter came to visit this evening at 1800 and noted that the patient was talking and generally at her baseline, though when she returned at 1900, she noted that the patient has markedly altered and fairly unresponsive to questions, prompting her to contact EMS for further evaluation here in the ED. While the patient was initially answering some questions on arrival, she became unresponsive to all stimuli, with no pulse en route. Two minutes of chest compressions were performed, with return of pulse. No shocks or epinephrine administered en route.    The patient's blood sugar was noted to be 48 and she was given one amp of D50, with improvement in her blood sugar. Her heart rate was remained between , with a systolic pressure of 70-80s.  Left IO was placed and the patient was given 500 ccs of fluids. 0.4 mg of intranasal Narcan given as she is currently on opioids, with no improvement in her mental status.     Allergies:  Amoxicillin  Penicillins  Chantix [Varenicline]  Estrogens  Ibuprofen  Lisinopril    Medications:    Percocet  Acyclovir  Aspirin  Tylenol and codeine  Hydrea  Norvasc  hydrochlorothiazide  Flonase  Metoprolol  Carafate  Anoro Ellipta  Zyban  Prilosec  Mometasone cream  Multivitamins    Past Medical History:    Breast cancer  Depression  HTN  GERD  Ischemic colitis  Lupus   Migraine  Obesity  obstructive sleep apnea  Thrombocytopenia  Exophthalmia    Past Surgical  History:    Bone Marrow biopsy  Breast reconstructions  esophagogastroduodenoscopy  Eyelid lesion excision  Gastric bypass  hysterectomy  Hernia repair  Left mastectomy  Breast lumpectomy x2  T and A  Wedge eyelid resection     Family History:    Cancer  DM  HTN  CAD    Social History:  Presents with family.   Current Every day smoker, 0.30 ppd for 40 years. Began Nicoderm patches last week.   Positive for alcohol use, occasionally.   Marital Status:  Single [1]    Review of Systems   Unable to perform ROS: Acuity of condition     Physical Exam     Patient Vitals for the past 24 hrs:   BP Temp Temp src Pulse Heart Rate Resp SpO2 Weight   01/05/18 0103 - 100.6  F (38.1  C) - - 100 - - -   01/05/18 0100 - 100.6  F (38.1  C) - - 98 (!) 46 (!) 76 % -   01/04/18 2308 - 99.3  F (37.4  C) - - - - - -   01/04/18 2307 - 99.3  F (37.4  C) - - - - - -   01/04/18 2306 - 99.3  F (37.4  C) - - - - - -   01/04/18 2305 - 99.3  F (37.4  C) - - - - (!) 85 % -   01/04/18 2304 - 99.3  F (37.4  C) - - 94 - (!) 83 % -   01/04/18 2303 (!) 88/54 99.3  F (37.4  C) - - 89 - 92 % -   01/04/18 2302 - 99.3  F (37.4  C) - - 87 - (!) 88 % -   01/04/18 2301 - 99.3  F (37.4  C) - - 85 - 93 % -   01/04/18 2300 (!) 75/23 - - - - - 94 % -   01/04/18 2259 - 99.3  F (37.4  C) - - 88 - 95 % -   01/04/18 2258 - 99.3  F (37.4  C) - - 87 - 94 % -   01/04/18 2257 (!) 79/69 99.3  F (37.4  C) - - - - (!) 88 % -   01/04/18 2245 (!) 52/29 99.7  F (37.6  C) - - 84 23 96 % -   01/04/18 2230 - - - - 84 17 - -   01/04/18 2215 (!) 75/43 98.6  F (37  C) - - 88 28 (!) 51 % -   01/04/18 2200 90/74 96.3  F (35.7  C) Bladder 92 90 (!) 43 - 87.1 kg (192 lb 0.3 oz)       Physical Exam    General:   Toxic appearing female   HEENT:    Oropharynx is dry  Eyes:    Conjunctiva normal     Pupils 5 mm and sluggish  Neck:    Supple, no meningismus.     CV:     Regular rate and rhythm.      No murmurs, rubs or gallops.         Non-palpable radial pulses bilateral.      Palpable  carotid pulse  PULM:    Clear to auscultation bilateral.     Tachycpneic     Poor air exchange.     No rales or wheezing.     No stridor.  ABD:    Soft, obese, non-distended.       No pulsatile masses.       No rebound, guarding or rigidity.  MSK:     Surgical incisions to left shoulder  LYMPH:   No cervical lymphadenopathy.  NEURO:   Lethargic     GCS E4 V3 M5 = 12     No tremor     Moves all 4 extremities  Skin:    Extremities and trunk are mottled and cool         Emergency Department Course   ECG:  Time: 2158  Vent. Rate 92 bpm. KS interval 172. QRS duration 102. QT/QTc 402/497. P-R-T axis 26 29 16. Normal sinus rhythm. Possible left atrial enlargement. Prolonged QT. Abnormal ECG. Read time: 2158    Imaging:  Radiographic findings were communicated with the family who voiced understanding of the findings.    CT Head without contrast:   No acute intracranial findings. As per radiology.    XR Chest Portable 1 view:   1. Midline ET tube.  2. Venous catheter in right atrium.  3. Vascular congestion and left lower lobe infiltrate. Small left  effusion.. As per radiology.     CT Chest/ Abdomen/Pelvis with contrast:   1. No visualized pulmonary embolism.  2. Moderate bibasilar opacities with some consolidation and volume  loss. This may be atelectasis and/or pneumonia.  3. Gallbladder is distended and contains multiple stones with possible  slight gallbladder wall thickening. Cholecystitis is possible.  Gallbladder ultrasound may be helpful.  4. Postoperative changes involving the stomach and small bowel loops.  Distended small bowel loops, most prominent in the upper abdomen.  Findings may be due to ileus but mechanical small bowel obstruction is  not excluded. Some of the more proximal distended loops could be due  to postoperative change.   5. Low-dense area in the left hepatic lobe, probably fatty  infiltration. Follow-up liver MRI at some point may be helpful to  confirm this. As per radiology.    US Abdomen,  "Limited Portable:  1. Examination is slightly limited as the patient is intubated and  could not be rolled into a decubitus position.  2. Distended gallbladder containing sludge. No obvious shadowing  gallstones or significant gallbladder wall thickening. If clinically  indicated, a HIDA scan could be performed. As per radiology.     Laboratory:  2156 Glucose by meter: 183 (H)  2200 Troponin: 0.601 (HH)  2205 ISTAT Chem 8:  (L), Chloride 84 (L), Total CO2 18 (L), Anion gap 22 (H), Glucose 165 (H), Creatinine 2.6 (H), GFR Estimate 19 (L), Calcium ionized 3.9 (L), HCG 9.9 (L), Hemacrit 29 (L)  2200 ISTAT VBG: pH 7.28 (L) / PCO2 38 (L) / PO2 33 / Bicarb 18 (L)   Lactic Acid: 11.1 (HH)  2200 VBG: pH 7.20 (L) / PCO2 46 / PO2 52 (H) / Bicarb 18 (L)  2259 ISTAT VBG: pH 7.18 (LL) / PCO2 39 (L) / PO2 51 (H) / Bicarb 15 (L)   Lactic Acid: 13.4. (HH)  0114 Lactic Acid: 11.6 (H)  0001 ABG: pH 7.28 (L) / PCO2 38 / PO2 103 / Bicarb 18 (L)    2200 CBC: WBC: 6.7, HGB: 10.0 (L), PLT: 173  2200 CMP: Glucose 162 (H),  (L), Chloride 88 (L), CO2 16 (L), Anion gap 22 (H), BUN 33 (H), Creatinine 2.07 (H), GFR Estimate 24 (L), Calcium 7.5 (L), Bilirubin total 8.3 (H), Albumin 1.9 (L), Protein total 4.2 (L), Alkphos 322 (H),  (H),  (HH), o/w WNL     INR: 2.11 (H)  PTT: 92 (H)  BNP: 07556 (H)    Blood cultures: pending X2     Procedures:    Intubation      Date/Time: 10:16 PM    The procedure was performed in an emergent situation.  Risks and benefits: risks, benefits and alternatives were discussed.  Patient identity confirmed: verbally with patient and arm band  Time out: Immediately prior to procedure a \"time out\" was called to verify the correct patient, procedure, equipment, support staff and site/side marked as required.    Indication: Airway protection.    Intubation method: Direct  Patient status: RSI  Preoxygenation: Mask  Pretreatment medications: None  Sedatives: Etomidate  Paralytic: " succinylcholine  Laryngoscope size: Mac 4  Tube size: 7.5 cuffed with cuff inflated after placement  Number of attempts: 1  Cricoid pressure: yes  Cords visualized: yes  Post-procedure assessment: Breath sounds equal bilaterally with chest rise and absent over the epigastrium, Chest x-ray interpreted by me demonstrating endotracheal tube in appropriate position and CO2 detector.  ETT to teeth: 23 cm  Tube secured with: ETT lizama    Patient tolerated the procedure well with no immediate complications.  Complications:  None      Central Line Placement     Procedure:  Central Line Placement with Ultrasound Guidance.    Indications: Vascular access, Central pressure monitoring, Sepsis (monitoring of mixed venous oxygen saturations) and Fluid administration    Consent:  Risks (including but not limited to: pneumothorax,bleeding, infection or artery puncture), benefits and alternatives were discussed with  patient and consent for procedure was obtained.    Timeout:  Universal protocol was followed. TIME OUT conducted just prior to starting procedure confirmed patient identity, site/side, procedure, patient position, and availability of correct equipment and implants.?  Yes    Procedure note:  Right Internal Jugular approach was selected and the right anterior neck was prepped, cleansed and draped in a sterile fashion.  Mask, gown and gloves were used per sterile protocol.  Patient was then placed into Trendelenburg position and lidocaine was used for local anesthesia.  Vascular probe with ultrasound was used in a sterile fashion for guidence.  Introducer needle was then used to gain access to the central venous circulation.  Using Seldinger technique the  Triple lumen catheter was placed.  Catheter port(s) were aspirated and flushed.  Central line was sutured in place and sterile dressing applied.   Appropriate placement was confirmed by x-ray and no pneumothorax was visualized .    Patient Status:  Patient tolerated the  procedure well.  There were no complications.       Narritive: Arterial Line Insertion      INDICATION: Continuous blood pressure monitoring    CONSENT:   Risks (including but not limited to: bleeding, infection or pain), benefits, and alternatives were discussed with patient and consent for procedure was obtained.      [place] protocol was followed. TIME OUT conducted just prior to    starting the procedure confirmed patient identity, site/side, procedure,    patient position, and availability of correct equipment and implants. Yes    The skin overlying the right femoral artery was prepped and draped in a sterile fashion. The artery was entered with a finder needle through which a guidewire was inserted. The needle was then removed and an arterial cannula was inserted over the guidewire without difficulty. The guidewire was removed and the catheter was sutured to the underlying skin. The patient tolerated the procedure well and there were no immediate complications.    PATIENT STATUS: The patient tolerated the procedure well and there were no complications.         Narritive:Bedside Cardiac US     Limited Bedside Cardiac Ultrasound, performed and interpreted by me.   Indication: Hypotension/shock.  Parasternal long axis, parasternal short axis and apical 4 chamber views were acquired.   Image quality was satisfactory.    Findings:    Abnormal RV was dilated and hypokinetic; LV function normal.  No pericardial effusion.  IMPRESSION: Abnormal limited cardiac ultrasound showing Hypokinetic dilated RV. .  No pericardial effusion.       Interve  2205 LR 2 L IV  2212 LR 2 L IV  2213 Etomidate 25 mg IV  2214 Succinylcholine 125 mg IV  2228 Fentanyl 50 mcg IV  2230 LR 1 L IV  2231 Fentanyl 50 mcg IV   2232 Norepinephrine 16 mg at 0.2 mcg/kg/min IV  2237 Norepinephrine rate increased to 0.25 mcg/kg/min IV  2242 Versed 1 mg IV   2244 Phenylephrine 50 mg at 0.5 mcg/kg/minute  IV   Norepinephrine rate increased to 0.3  mcg/kg/min IV  2254 Solu-cortef 100 mg IV  2302 Sodium Bicarbonate 8.4% 100 mEq IV  2309 Meropenem 1 g IV  2331 Versed 1 mg IV   0002 Vancomycin 2000 mg IV  0022 Versed 2 mg/hour IV (see MAR for rate changes)  0027 Versed 1 mg IV   0046 Dextrose 50% 150 mL/hr IV      Please consult MAR for complete list of medications administered during ED course     Emergency Department Course:  2152 The patient arrived here in the ED via EMS to room 33. I performed an exam of the patient as documented above. The patient was placed on continuous pulse oximetry and cardiac monitoring while here in the ED.    IV inserted. Medicine administered as documented above. Blood drawn. This was sent to the lab for further testing, results above.  2158 EKG obtained in the ED, see results above.   The patient had a urinary catheter placed here in the emergency department without difficulty. The patient provided a urine sample here in the emergency department. This was sent for laboratory testing, findings above.   2200 Family has arrived here in the ED. I spoke with them briefly  2205  LR 2 L IV administered.   2208 RR 32 at this time.   2208 Bear hugger placed.   2212 LR 1 L IV administered.   2213 Etomidate 25 mg IV administered.   2214 Succinylcholine 125 mg  IV administered.   2215 Intubation performed, as noted above.   2221 NG tube placed in right nare by nursing staff.  2226 Central line was placed, as noted above.   2228 Fentanyl 50 mcg IV administered.   2229 LR 1 L IV administered.   2231 Fentanyl 50 mcg IV administered.   2232 Norepinephrine 16 mg at 0.2 mcg/kg/min IV administered.    2237 Norepinephrine rate increased to 0.25 mcg/kg/min IV.   2238 Imaging arrived in room 33 for portable chest XR.  2240 The patient was placed in soft restraints.   2241 The patient had a portable chest XR while in the emergency department, findings above. This was reviewed by myself at the bedside.   2242 Versed 1 mg IV administered.    2244 Norepinephrine rate increased to 0.3 mcg/kg/min IV and switched from peripheral line to central line.  Phenylephrine 50 mg at 0.5 mcg/kg/minute  IV administered.   2248 BP 56/29.   2249 Arterial line was placed, as noted above.   2250 LR 1 L IV administered via central line. Hydrocortisone 100 mg IV administered.   2252 BP 96/68.  2254 Solu-cortef 100 mg IV  2258 Bedside cardiac US was performed, as noted above. No fluid seen in suprapubic view. No fluid seen in RUQ.   2301 I spoke with the patient's family here in the ED and updated them on the patient's condition.   2301 Sodium Bicarbonate 8.4% 50 mEq IV administered.   2302 Sodium Bicarbonate 8.4% 50 mEq IV administered.   2303 Versed 1 mg IV administered.   2309 Meropenem 1 g IV administered.   2319 The patient was sent for a CT Head and CT Chest and CT Abdomen/Pelvis while in the emergency department, findings above.   2331 Versed 1 mg IV administered.   2337 I viewed CT images personally and consulted with radiology. No central PE seen and no evidence of intracranial hemorrhage.   0002 Vancomycin 2000 mg IV administered.   0024 Repeat profusion examination performed.   0022 Versed 2 mg/hour IV administered.   0027 Versed 1 mg IV administered.   0046 Dextrose 50% 150 mL/hr IV administered.   Bedside Abdominal US was performed, see findings above.   0109  I consulted with Dr. Cabrera of the hospitalist services. They are in agreement to accept the patient for admission.  Findings and plan explained to the Patient who consents to admission. Discussed the patient with Dr. Cabrera, who will admit the patient to an ICU bed for further monitoring, evaluation, and treatment.    Impression & Plan    CMS Diagnoses: The patient has signs of Septic Shock as evidenced by:    1. Presence of Sepsis, AND  2. Lactic Acid level >4  Time sepsis diagnosis confirmed = 2211 as this was the time whenLactate was resulted and the level was >4    3 Hour Septic Shock  Bundle Completion:  1. Initial Lactic Acid Result:   Recent Labs   Lab Test  18   2259  18   2200   LACT  13.4*  11.1*     2. Blood Cultures before Antibiotics: No, antibiotics were started prior to blood culture collection because waiting for the blood culture to be collected would have resulted in a delay of 45 minutes or more to the administration of antibiotics.  3. Broad Spectrum Antibiotics Administered: Yes  Meropenem IV  Vancomycin IV    4. 5000 ml of IV fluids.  the patient was transferred out of the ED prior to the 6 hour aryan.     Medical Decision Makin-year-old female presented to the ED in shock with diminished cognitive function consistent with acute encephalopathy for which the source of her illness was uncertain upon presentation.    1. Shock: Patient has no evidence of acute blood loss.  Bedside ultrasound revealed no evidence of pericardial effusion.  Ejection fraction was not significantly depressed.  There was concern that this may represent massive pulmonary embolus as bedside ultrasound revealed an enlarged right ventricle that was hypokinetic.  Although she had acute kidney injury, she was in a grave condition and if PE was not diagnosed in this scenario certainly she would have impending circulatory collapse and death in the absence of appropriate treatment with thrombolytics.  After shared decision making with family members, we agreed to perform CT scan with IV contrast despite the risk of contrast-induced nephropathy.  Fortunately CT scan shows no evidence of pulmonary embolus or vascular catastrophe with aortic dissection or aortic aneurysm.  No concern that she has neurologic shock.  At this point it appeared that her shock is likely related to sepsis.  She does have bibasilar infiltrates and questionable findings of cholecystitis on CT scan for which right upper quadrant ultrasound is pending.  She was given fluids but was nonresponsive thus required central line  placement with initiation of vasopressors.  She was in vasopressor refractory shock.  Due to this, she was given 100 mg of hydrocortisone and then placed on phenylephrine.  Blood pressure has now stabilized with a map greater than 65.    2. Encephalopathy: Encephalopathy is likely related to cerebral hypoperfusion related to shock and likely infectious encephalopathy.  No evidence of acute intracranial hemorrhage or mass on CT scan.  Uncertain whether there is any degree of anoxic injury related to her episode of possible arrest noted by EMS.    3. Respiratory insufficiency: On presentation it was felt that the patient cannot protect her own airway relates related to her altered mental status and certainly needed to reduce any workload on the patient given her profound shock.  She was intubated without difficulty.  Airway protective strategies initiated.  ABG shows appropriate ventilator settings at this time.    4. Pneumonia: Bibasilar infiltrates on CT scan.  Patient given broad-spectrum antibiotics with meropenem and vancomycin.    5. Elevated troponin: No ischemic changes on EKG.  Elevated troponin related to acute illness and shock.  No concerns of organic coronary artery disease at this time.      Total critical care time: 100 minutes    Diagnosis:    ICD-10-CM   1. Shock (H) R57.9   2. Encephalopathy G93.40   3. Acute kidney injury (H) N17.9   4. Respiratory insufficiency R06.89   5. Lactic acidosis E87.2   6. Elevated troponin R74.8   7. Shock liver K72.00   8. Pneumonia of both lower lobes due to infectious organism J18.9   9. Calculus of gallbladder without cholecystitis without obstruction K80.20       Disposition:  Admitted to Dr. Cabrera of the hospitalist services    I, Mabel Delvalle, am serving as a scribe on 1/4/2018 at 9:52 PM to personally document services performed by Heber Camarena MD based on my observations and the provider's statements to me.     Mabel Delvalle  1/4/2018   [facility] [place]  \[facility] [place] [facility]\"" EMERGENCY DEPARTMENT       Heber Camarena MD  01/05/18 2227

## 2018-01-05 NOTE — PROGRESS NOTES
Pt arrived to floor at 0200.  Intubated.  Lips blue in color, jaundiced in appearance over whole body, mottling noted in upper portion of legs and lower abdomen.  Nail beds were dusky.  Difficulty to get pulse ox d/t cool extremities.  Sedated with versed.  Phenylephrine and levophed infusing.  CL in R IJ and R femoral artline. Pt MAP continued to be low in upper 40 to low 50's.  Continued to titrate pressors.  Added vasopressin.  Gave calcium chloride and sodium bicarbonate multiple times.  Added bicarb to IVF.  IR placed drain at bedside and removed brown, odorous fluid.  Sutured a BALDO drain in place.  Continued to drain brown, odorous fluid.  BG ranged from 's, drawn from artline.  Pt's BP became less responsive to intervention despite multiple pressors maxed and continued to trend down.  HR was noted to be decreasing as BP continued to decrease.  CPR was initiated and meds given per MD instructions.  Huy machine was utilized.  Initially got a perfusing pulse but was lost again. CPR efforts ceased at 0655.  Family notified.

## 2018-01-05 NOTE — PROGRESS NOTES
"SPIRITUAL HEALTH SERVICES Progress Note  [facility]    Met Oxana Stefany's, or better, \"Vira's\" sister, as she was receiving a prayer shawl from nursing. Then, met the rest of the gathered family in the waiting room which included Meghana and Shyam. Kerline and her spouse showed up later. I joined Oxana, Meghana, and Kerline in Vira's room for prayer. The family was going to contact a  home in [place] and they were going to connect with their  from [facility] in [place].      Sergey Parra  Chaplain Resident  [phone]  "

## 2018-01-05 NOTE — ED NOTES
Pt had shoulder surgery left, on Monday . Sister went to house and found her not responsive. . Called 911 . Sounds like iv and fluids going in on our arrival . I took over at 2300. Got pt to ct. Came back stabilizing pt. On drips IO removed from left lower leg 2 nd blood culture drawn pt mottled from pelvic area down cap refills have gone from 7sec to 4 . Pt awake follows some commands.  clear. On vent, central line in right neck, art line in right groin. Soft wrist restraints are on

## 2018-01-05 NOTE — PHARMACY-VANCOMYCIN DOSING SERVICE
Pharmacy Vancomycin Initial Note  Date of Service 2018  Patient's  1955  62 year old, female    Indication: Sepsis    Current estimated CrCl = Estimated Creatinine Clearance: 29.9 mL/min (based on Cr of 2.19).    Creatinine for last 3 days  2018: 10:00 PM Creatinine 2.07 mg/dL  2018:  2:50 AM Creatinine 2.19 mg/dL    Recent Vancomycin Level(s) for last 3 days  No results found for requested labs within last 72 hours.      Vancomycin IV Administrations (past 72 hours)                   vancomycin (VANCOCIN) 2,000 mg in NaCl 0.9 % 500 mL intermittent infusion (mg) 2,000 mg New Bag 18 0002                Nephrotoxins and other renal medications (Future)    Start     Dose/Rate Route Frequency Ordered Stop    18 0000  vancomycin (VANCOCIN) 1,750 mg in NaCl 0.9 % 500 mL intermittent infusion      1,750 mg  over 2 Hours Intravenous EVERY 24 HOURS 18 0428      18 0445  gentamicin (GARAMYCIN) 140 mg in NaCl 0.9 % 50 mL intermittent infusion      2 mg/kg × 70.1 kg (Adjusted)  over 60 Minutes Intravenous ONCE 18 0433      18 0300  vasopressin (PITRESSIN) 40 Units in D5W 40 mL infusion      2.4 Units/hr  2.4 mL/hr  Intravenous CONTINUOUS 18 0245      18 2311  norepinephrine (LEVOPHED) 16 mg in D5W 250 mL infusion      0.03-0.4 mcg/kg/min × 87.1 kg  2.4-32.7 mL/hr  Intravenous CONTINUOUS 18 2310      18 2250  phenylephrine (KATIE-SYNEPHRINE) 50 mg in NaCl 0.9 % 250 mL infusion      0.5-6 mcg/kg/min × 87.1 kg  13.1-156.8 mL/hr  Intravenous CONTINUOUS 18 2250            Contrast Orders - past 72 hours (72h ago through future)    Start     Dose/Rate Route Frequency Ordered Stop    18 2323  iopamidol (ISOVUE-370) solution 500 mL      500 mL Intravenous ONCE 18 2322 18 2332                Plan:  1.  Start vancomycin  1750 mg IV q24h.   2.  Goal Trough Level: 15-20 mg/L   3.  Pharmacy will check trough levels as appropriate in  1-3 Days.    4. Serum creatinine levels will be ordered daily for the first week of therapy and at least twice weekly for subsequent weeks.    5. Sherborn method utilized to dose vancomycin therapy: Method 2    Bruce Iniguez RPh

## 2018-01-05 NOTE — PROGRESS NOTES
ICU Staff:     The patient, Stefany Vega, is a 62 year old woman admitted to the ICU for severe septic shock thought to be due to acute cholecystitis. The patient required a brief episode of CPR by the EMS team and intubation and vent support on arrival to the [facility] ED. The patient also has a history of the Lupus anticoagulant and takes Coumadin. . I spoke with the patient's sister who gave permission for emergency bedside percutaneous drainage of the gallbladder and for blood transfusion. The patient sister was regarding the critical illness issues by telephone this am. Dr. Parikh is at the bedside performing the IR procedure now.     PAST MEDICAL HISTORY:  Past Medical History:   Diagnosis Date     Abnormal glucose     89 nonfasting - hgb a1c 2/3/06 = 6.3 prior to gastric bypass     Breast cancer (H) 1999    Dr Recio -estrogen receptor positive tumor      Depression     was on citalopram - worked well - now off meds      Edema     left ankle      Essential hypertension 1999    has been on diovan- no longer covered by insurance, lisinopril = dry cough, cozaar =terrible headaches        Gastro-oesophageal reflux disease      Ischemic colitis (H) 11/2011     Leukocytosis      Lupus anticoagulant disorder (H)     on coumadin- seeing Dr. Kelly     Migraine     were menstrual , now a bit more cluster-like      Obesity, morbid (H)      JESICA (obstructive sleep apnea)     has not used CPAP since gastric bypass     Seasonal allergies     seasonal     Thrombocytosis (H)         PAST SURGICAL HISTORY:  Past Surgical History:   Procedure Laterality Date     BONE MARROW BIOPSY, BONE SPECIMEN, NEEDLE/TROCAR Right 7/24/2017    Procedure: BIOPSY BONE MARROW;  BONE MARROW ASPIRATE and BIOPSY Right Iliac Crest;  Surgeon: Nicolas Mcclellan MD;  Location: [facility] OR     Breast reconstruction Left 2/00     COLONOSCOPY  2010    fair prep - needs repeat in 2013 per gastro     ESOPHAGOSCOPY, GASTROSCOPY, DUODENOSCOPY (EGD),  COMBINED  11/1/2013    Procedure: COMBINED ESOPHAGOSCOPY, GASTROSCOPY, DUODENOSCOPY (EGD), BIOPSY SINGLE OR MULTIPLE;  ESOPHAGOSCOPY, GASTROSCOPY, DUODENOSCOPY (EGD) with bx;  Surgeon: Jas Lai DO;  Location:  GI     ESOPHAGOSCOPY, GASTROSCOPY, DUODENOSCOPY (EGD), COMBINED N/A 5/20/2016    Procedure: COMBINED ESOPHAGOSCOPY, GASTROSCOPY, DUODENOSCOPY (EGD);  Surgeon: Junior Romano MD;  Location:  GI     EXCISE LESION EYELID Right 12/24/2014    Procedure: EXCISE LESION EYELID;  Surgeon: Albin Acevedo MD;  Location: [facility]     GASTRIC BYPASS  12/23/2009    Dr. Richards - [facility] - s/p laparoscopic Richard-en-y 2004     HYSTERECTOMY, YIFAN  7/2005    s/p YIFAN/BSO/fibroid/endometriosis-precancer from tamoxifen      incisional hernia with SB infarct and resection  9/05     Left mastectomy  2/00     LUMPECTOMY BREAST Right 10/2009     -  lobular carcinoma in-situ     LUMPECTOMY BREAST x2 Left 2/00     RECONSTRUCT EYELID Right 12/24/2014    Procedure: RECONSTRUCT EYELID;  Surgeon: Albin Acevedo MD;  Location: [facility]     right achilles torn, tendon graft  2004     TONSILLECTOMY, ADENOIDECTOMY, COMBINED  1963     WEDGE RESECTION EYELID Right 9/12/2014    Procedure: WEDGE RESECTION EYELID;  Surgeon: Albin Acevedo MD;  Location: [facility]           ALLERGIES:  Allergies   Allergen Reactions     Amoxicillin Anaphylaxis     Penicillins Anaphylaxis and Swelling     Passed out with tongue swelling in 1980's , but has had augmentin in 2013 without any problems.      Chantix [Varenicline]      Bad nightmares      Estrogens      Can't be on secondary to HX of estrogen receptor positive breast cancer      Ibuprofen Rash     Lisinopril Cough        SOCIAL HISTORY:  Social History     Social History     Marital status: Single     Spouse name: N/A     Number of children: 0     Years of education: N/A     Occupational History           Social History Main Topics     Smoking status: Current Every Day  Smoker     Packs/day: 0.30     Years: 40.00     Types: Cigarettes     Last attempt to quit: 6/5/2012     Smokeless tobacco: Never Used      Comment: started again 8/05 - strongly encourage cessation with every visit - needs to quit for weight loss surgery - noted 7/09      Alcohol use 0.0 oz/week     0 Standard drinks or equivalent per week      Comment: 0-2 per week     Drug use: No     Sexual activity: Yes     Partners: Male      Comment: post menopausal      Other Topics Concern     Caffeine Concern Yes     3 cups/day     Sleep Concern Yes     nocturia 3 x's     Special Diet No     Exercise No     always on the go     Seat Belt Yes     always      Self-Exams Yes     SBE encouraged monthly      Parent/Sibling W/ Cabg, Mi Or Angioplasty Before 65f 55m? No     Social History Narrative    calcium - 1 600mg plus 200-400 iu three times daily with individual vitamin D 1000 iu in am     colonoscopy -ordered today July 20, 2010     sun precautions - discussed     mammogram  - twice  yearly secondary to hx of breast cancer     Td booster - 11/28/2008    pneumovax -2005, will give booster July 20, 2010 secondary to asthma     DEXA -9/25/2009    stool hemoccults - every year after age 40    ASA- contraindicated secondary to gastric bypass     mulvitamin - encouraged        FAMILY HISTORY:  Family History   Problem Relation Age of Onset     CANCER Mother      Cardiovascular Mother      DIABETES Mother      juanjose morales     Hypertension Mother      C.A.D. Mother      juanjose morales     Cardiovascular Father      fluid around heart - takes HCTZ - pt doesn't think dx is CHF      CANCER Father      Hypertension Father      Hypertension Paternal Grandmother      C.A.D. Paternal Grandmother      Cancer - colorectal No family hx of         PHYSICAL EXAM:  Vital Signs: BP (!) 87/32  Pulse 92  Temp 103.8  F (39.9  C)  Resp (!) 36  Wt 87.1 kg (192 lb 0.3 oz)  LMP 09/21/2004  SpO2 (!) 79%  BMI 31.23 kg/m2   GEN: intubated and  sedated.   HEENT: Normocephalic, intubated, atraumatic. PERR.   Chest: Course billaeaterlly   Cor: Regular tachycardia; no murmurs.????   Abd: Soft, non-tender, non-distended, no masses.????   Ext: mottled cool extremities.   Neuro: the patient intubated and she appears comfortable on the vent. ?     A/P:     The patient, Stefany Vega, is a 62 year old woman admitted to the ICU for severe septic shock thought to be due to acute cholecystitis. The patient required a brief episode of CPR by the EMS team and intubation and vent support on arrival to the [facility] ED. The patient also has a history of the Lupus anticoagulant and takes Coumadin. . I spoke with the patient's sister who gave permission for emergency bedside percutaneous drainage of the gallbladder and for blood transfusion. The patient sister was regarding the critical illness issues by telephone this am. Dr. Parikh is at the bedside performing the IR procedure now.     NEURO: the patient demonstrated purposeful movements at the time of initial evaluation; she appears comfortable on the vent. Versed and fentanyl drips. ?   CARDIAC: septic shock. The patient has severely reduced cardiac function by bedside US evaluation She has responded to pressor agents and a bicarb drip. I have discussed the options of the patient regarding transport with the TELE ICU staff and we are in agreement to attempt to resuscitate the patient following IR drainage of the gallbladder before considering transfer.   PULM: acute hypoxic respiratory failure requiring intubation and vent support for hypoxia. Flolan at 20.  Unable to give additional PEEP due to the patient hypotension.   ID: patient given triple broad spectrum antibiotic coverage for sepsis.   GI: acute cholecystitis. IR drainage is being done at the bedside now.   : the patient is anuric at this time due to severe FARIDEH. Fonseca in place. Will consult nephrology today.   HEME: INR is elevated. FFP infusion now for  the IR procedure. .     The patient is critically ill and she requires ICU supportive care for severe septic shock thought to be due to acute cholecystitis and acute hypoxic respiratory failure requiring intubation and vent support. IR drainage is being done at the bedside now; 300 ml of foul smelling fluid drained.  The patient is also in acute renal failure. 90 min of critical care time required to examine the patient, to review the patient's medical records,and to coordinate the patient's ICU care; the critical care time was exclusive of any time required to perform the bedside procedures.     Addendum:    The right groin A-line does not appear to be giving  Accurate BP measurements as the BP cuff is giving a much better reading.  The family has been updated by telephone.  I have updated the TELE ICU who agrees with the plan to continue resuscitation at the [facility] now that the IR drainage of the gallbladder has been performed at the bedside.  I have also given report to with Dr. Obed Parsons who will begin [facility] ICU coverage this morning.     Junior Enciso MD, PhD

## 2018-01-05 NOTE — PROGRESS NOTES
ICU Staff:    The patient has required a brief episode of CPR and now continuous CPR support.  The patient does respond to intermittent bicarb boluses.  I will contact the [facility] for discussion regarding the potential for ECMO cannulation and support.  Family has been called to provide updates.  The patient sister is on the way to the hospital.    Junior Enciso MD, PhD

## 2018-01-05 NOTE — H&P
[facility]  Hospitalist Admission Note  Name: Stefany Vega    MRN: [id number]  YOB: 1955    Age: 62 year old  Date of admission: 1/4/2018  Primary care provider: Lisa Mcguire    Chief Complaint:  shock    Stefany Vega is a 62 year old female with PMH including depression, GERD, lupus anticoagulant (per her sister no longer on coumadin), JESICA, HTN, remote hx of breast cancer who presents with altered mental status found to be in a shock state.  She received 2 minutes of CPR in the field and was profoundly hypotensive in the ER found to have lactic acidosis, bilateral pulmonary infiltrates and fever with numerous other lab abnormalities (abnormal LFTs w/ bilirubin of 8.3, FARIDEH etc).  She was intubated and required high rate vasopressors and was started on IV vancomycin and meropenem.  Cental line and arterial line were placed and she received ~7 liters fluid in the ER.  I am now asked to admit her to the ICU for ongoing care.    Update: US shows distended gall bladder w/ sludge.  Given how critically ill she is with unclear source I would favor perc cholecystostomy tube empirically.  Discussed w/ Tele hub MD who is in agreement.  Have placed order for IR perc cholecystostomy tube placement.  The ICU MD (daniella) has been contacted for bedside evaluation following arrival to the ICU.    Assessment and Plan:   1. Shock state with lactic acidosis: As above.  Given fevers and overall clinical picture seems most likely distributive/septic though could consider cardiogenic as well.  CTPA does not show PE.  Source of her infection is not entirely clear but she does have an infiltrate on CXR/CT scan so pneumonia may be the primary etiology.  Influenza has been extremely prominent in the community lately so I will check a PCR and treat w/ empirically w/ tamiflu. There is no clear history of increased shoulder (or other joint) pain to suggest a septic joint. She has had some vague  abdominal symptoms (?upset stomach, poor appetite) at home per family though generally is very stoic.  CT scan of her abdomen shows a large liver and distended gall bladder with possible mild wall thickening but no obvious obstruction.  For now we'll cover broadly with vancomycin/meropenem and admit to the ICU for ventilatory and hemodynamic support.  Pending US findings and LFT trend (and other initial workup) we may need to consider percutaneous cholecystostomy tube later this morning empirically.  --admit to ICU, intubated and sedated  --Has IJ central venous access, femoral arterial line  --trend lactate, LFTs, renal function, WBC, ins/outs etc  --continue NE, will add vasopressin in the hope that we can wean off of the phenylephrine  --will continue hydrocortisone 50 mg Q6H for now as her blood pressure seemed to improve somewhat in the ER after getting 100 mg dose (though unclear if that was directly related to steroids).  --continue broad spectrum IV abx with meropenem and vancomycin  --Intensivist consult, signing the patient out via phone tonight  --I did contact araceli, her sister to gather as much information as I could.  --D5NS at 125 cc/hr  --rich  --check TTE  --?DIC.  Check fibrinogen, recheck INR.    2.   Toxic metabolic encephalopathy: due to #1 above.  She was following some commands following intubation and appearing more alert.  cT head was negative.  Treat as above, await clearing.    --currently on versed for sedation, have added propofol for use if bp improves.    3.   Possible out of hospital cardiac arrest: it is unclear if she ever truly arrested but did get 2 minutes of cpr as pulses could not be palpated and she was quite hypotensive.  Has purposeful movements, eyes open, tracks intermittently.    4.   Acute Kidney Injury: appears to be in a pre-renal state due to shock.  At risk of ATN.  Recheck bmp now and trend.  Rich placed, monitor strict ins/outs and avoid nephrotoxins.    5.    Elevated troponin: 0.6.  Doubt primary cardiac event, most likely demand ischemia though I will check a TTE to evaluate cardiac function further.    6.   Hypoxemic respiratory failure due to critical illness, altered mental status: currently intubated and sedated.  intensivist consulted re: vent management.  Her bnp is quite elevated so we'll check an echocardiogram to help evaluate cardiac function.  She received 7+ liters in the ER due to profound hypotension and likely does have some component of pulmonary edema at this point.  --continue vent management as per intensivist  --IV vanco/meropenem for suspected pneumonia  --scheduled nebs    7.  Abnormal LFTs, notably elevated bilirubin (8.3).  By history shock liver seems very likely.  Formal RUQ US is pending to help evaluate further for cholangitis/cholecystitis etc.  If bilirubin is rising and/or imaging is more convincing for a bililary source of sepsis would need to consider cholecystostomy tube.  Will await this result, continue broad IV antibiotic coverage and recheck her LFTs    8.  Hx of lupus anticoagulant: her INR Is 2.11 but per her sister, Oxana, she is not on coumadin.  Trend.  Will hold chemical AC pending INR trend.     9.  Hyponatremia: recheck    10.  PCN allergy: per chart review.  She was started on meropenem in the ER given her critical illness, low but real risk of cross-reactivity.  No apparent undue effects to this point so will continue.  Monitor.    DVT Prophylaxis: INR Is currently 2.11, reportedly no longer on coumadin.  Will trend this, suspect INR is elevated due to sepsis.  Monitor.  Code Status: Full Code  Discharge Dispo: admit to the ICU      History of Present Illness:  Stefany Vega is a 62 year old female with PMH including depression, GERD, lupus anticoagulant on coumadin, JESICA, HTN, remote hx of breast cancer who presents with altered mental status found to be in a shock state.  She underwent shoulder surgery (left rotator  cuff repair) recently on 12/27/17 and was recovering well at home per her sister.  Yesterday she had vague complaints of not feeling well.  She complained of poor appetite.  Her sister went back to check on her tonight and noticed she looked somewhat ill and not quite herself.  Oxana, her sister, says that Stefany generally doesn't complain even when she is not feeling well.  She assures me that Oxana drinks very little but she does smoke.   There is no clear history of increased shoulder (or other) pain to suggest a septic joint.    EMS was called as she started to look very ill and rapidly declined and became less responsive this evening.   Apparently she was obtunded and hypotensive to the point that they could not find a pulse so they started CPR for a couple of minutes.  Without any specific other interventions it sounds like they were able to palpate pulses.  No shocks were delivered.  She was given D50 en route.    Here in the ER she was hypotensive and was given 3L IVF and had central line/a-line placed.  She was acidotic with significantly elevated lactic acid, elevated LFTs including bili of 8.3, alk phos of 322 and ALT of 465 and AST of 768.  bnp was [id number].  She was requiring high rate NE as well as stress dose hydrocortisone.  She did undergo CT head and pulmonary angiogram.  She was found to have bibasilar infiltrates and the gall bladder appeared to have stones and possible mild wall thickening.  Ultrasound is pending.         Past Medical History:  Past Medical History:   Diagnosis Date     Abnormal glucose     175 nonfasting - hgb a1c 2/3/06 = 6.3 prior to gastric bypass     Breast cancer (H) 1999    Dr Recio -estrogen receptor positive tumor      Depression     was on citalopram - worked well - now off meds      Edema     left ankle      Essential hypertension 1999    has been on diovan- no longer covered by insurance, lisinopril = dry cough, cozaar =terrible headaches        Gastro-oesophageal  reflux disease      Ischemic colitis (H) 11/2011     Leukocytosis      Lupus anticoagulant disorder (H)     on coumadin- seeing Dr. Kelly     Migraine     were menstrual , now a bit more cluster-like      Obesity, morbid (H)      JESICA (obstructive sleep apnea)     has not used CPAP since gastric bypass     Seasonal allergies     seasonal     Thrombocytosis (H)      Past Surgical History:  Past Surgical History:   Procedure Laterality Date     BONE MARROW BIOPSY, BONE SPECIMEN, NEEDLE/TROCAR Right 7/24/2017    Procedure: BIOPSY BONE MARROW;  BONE MARROW ASPIRATE and BIOPSY Right Iliac Crest;  Surgeon: Nicolas Mcclellan MD;  Location: [facility] OR     Breast reconstruction Left 2/00     COLONOSCOPY  2010    fair prep - needs repeat in 2013 per gastro     ESOPHAGOSCOPY, GASTROSCOPY, DUODENOSCOPY (EGD), COMBINED  11/1/2013    Procedure: COMBINED ESOPHAGOSCOPY, GASTROSCOPY, DUODENOSCOPY (EGD), BIOPSY SINGLE OR MULTIPLE;  ESOPHAGOSCOPY, GASTROSCOPY, DUODENOSCOPY (EGD) with bx;  Surgeon: Jas Lai DO;  Location:  GI     ESOPHAGOSCOPY, GASTROSCOPY, DUODENOSCOPY (EGD), COMBINED N/A 5/20/2016    Procedure: COMBINED ESOPHAGOSCOPY, GASTROSCOPY, DUODENOSCOPY (EGD);  Surgeon: Junior Romano MD;  Location:  GI     EXCISE LESION EYELID Right 12/24/2014    Procedure: EXCISE LESION EYELID;  Surgeon: Albin Acevedo MD;  Location: [facility]     GASTRIC BYPASS  12/23/2009    Dr. Richards - [facility] - s/p laparoscopic Richard-en-y 2004     HYSTERECTOMY, YIFAN  7/2005    s/p YIFAN/BSO/fibroid/endometriosis-precancer from tamoxifen      incisional hernia with SB infarct and resection  9/05     Left mastectomy  2/00     LUMPECTOMY BREAST Right 10/2009     -  lobular carcinoma in-situ     LUMPECTOMY BREAST x2 Left 2/00     RECONSTRUCT EYELID Right 12/24/2014    Procedure: RECONSTRUCT EYELID;  Surgeon: Albin Acevedo MD;  Location: [facility]     right achilles torn, tendon graft  2004     TONSILLECTOMY, ADENOIDECTOMY, COMBINED   1963     WEDGE RESECTION EYELID Right 9/12/2014    Procedure: WEDGE RESECTION EYELID;  Surgeon: Albin Acevedo MD;  Location: [facility]     Social History:  Never .  Minimal etoh.  Oxana, her sister seems to be her primary contact.  Social History   Substance Use Topics     Smoking status: Current Every Day Smoker     Packs/day: 0.30     Years: 40.00     Types: Cigarettes     Last attempt to quit: 6/5/2012     Smokeless tobacco: Never Used      Comment: started again 8/05 - strongly encourage cessation with every visit - needs to quit for weight loss surgery - noted 7/09      Alcohol use 0.0 oz/week     0 Standard drinks or equivalent per week      Comment: 0-2 per week     Social History     Social History Narrative    calcium - 1 600mg plus 200-400 iu three times daily with individual vitamin D 1000 iu in am     colonoscopy -ordered today July 20, 2010     sun precautions - discussed     mammogram  - twice  yearly secondary to hx of breast cancer     Td booster - 11/28/2008    pneumovax -2005, will give booster July 20, 2010 secondary to asthma     DEXA -9/25/2009    stool hemoccults - every year after age 40    ASA- contraindicated secondary to gastric bypass     mulvitamin - encouraged      Family History:  Family History   Problem Relation Age of Onset     CANCER Mother      Cardiovascular Mother      DIABETES Mother      juanjose morales     Hypertension Mother      C.A.D. Mother      juanjose morales     Cardiovascular Father      fluid around heart - takes HCTZ - pt doesn't think dx is CHF      CANCER Father      Hypertension Father      Hypertension Paternal Grandmother      C.A.D. Paternal Grandmother      Cancer - colorectal No family hx of      Allergies:  Allergies   Allergen Reactions     Amoxicillin Anaphylaxis     Penicillins Anaphylaxis and Swelling     Passed out with tongue swelling in 1980's , but has had augmentin in 2013 without any problems.      Chantix [Varenicline]      Bad nightmares       Estrogens      Can't be on secondary to HX of estrogen receptor positive breast cancer      Ibuprofen Rash     Lisinopril Cough     Medications:  Prior to Admission Medications   Prescriptions Last Dose Informant Patient Reported? Taking?   ANORO ELLIPTA 62.5-25 MCG/INH oral inhaler   Yes No   Sig: Inhale 1 puff into the lungs daily    Metoprolol Tartrate (LOPRESSOR PO)   Yes No   Sig: Take 25 mg by mouth every morning   Vitamin D, Cholecalciferol, 1000 UNITS TABS   Yes No   Sig: Take 1 tablet by mouth daily   acetaminophen-codeine (TYLENOL WITH CODEINE #3) 300-30 MG per tablet   No No   Sig: Take 1-2 tablets by mouth every 4 hours as needed for mild pain   acyclovir (ZOVIRAX) 800 MG tablet   No No   Sig: Take one tablet by mouth three times daily  for 2 days   amLODIPine (NORVASC) 5 MG tablet   No No   Sig: Take 1 tablet (5 mg) by mouth every evening   aspirin 81 MG tablet   Yes No   Sig: Take 1 tablet (81 mg) by mouth daily   buPROPion (ZYBAN) 150 MG 12 hr tablet   Yes No   Sig: Take 150 mg by mouth 2 times daily    calcium-vitamin D (CALCIUM 600 + D) 600-400 MG-UNIT per tablet   Yes No   Sig: Take 1 tablet by mouth 2 times daily   cetirizine (ZYRTEC) 10 MG tablet   Yes No   Sig: Take 10 mg by mouth daily    cyanocolbalamin (VITAMIN  B-12) 500 MCG tablet   Yes No   Sig: Take 500 mcg by mouth daily   fluticasone (FLONASE) 50 MCG/ACT spray   Yes No   Sig: Spray 1-2 sprays into both nostrils daily as needed for rhinitis or allergies   glucosamine-chondroitin 500-400 MG CAPS   Yes No   Sig: Take 1 capsule by mouth At Bedtime   hydrochlorothiazide (HYDRODIURIL) 25 MG tablet   No No   Sig: TAKE 1 TABLET BY MOUTH EVERY DAY IN THE MORNING   hydroxyurea (HYDREA) 500 MG capsule CHEMO   Yes No   Sig: Take by mouth daily   mometasone (ELOCON) 0.1 % cream   No No   Sig: APPLY TOPICALLY TO HANDS FOR UP TO 14 DAYS AT A TIME    omeprazole (PRILOSEC) 40 MG capsule   No No   Sig: TAKE ONE CAPSULE BY MOUTH TWICE DAILY    oxyCODONE-acetaminophen (PERCOCET) 5-325 MG per tablet   No No   Sig: Take 1 tablet by mouth every 4 hours as needed for pain maximum 6 tablet(s) per day   potassium 99 MG TABS   Yes No   Sig: Take 1 tablet by mouth daily   sucralfate (CARAFATE) 1 GM tablet   Yes No   Sig: Take 1 g by mouth 4 times daily as needed      Facility-Administered Medications: None         Review of Systems:  Unable to obtain secondary to AMS.  Sources of information include the ED, pre-hospital providers and the EMR.  As able to be obtained, pertinent information is located in the HPI.       Physical Exam:  Blood pressure (!) 88/54, pulse 92, temperature 100.6  F (38.1  C), resp. rate (!) 46, weight 87.1 kg (192 lb 0.3 oz), last menstrual period 09/21/2004, SpO2 (!) 76 %, not currently breastfeeding.  Wt Readings from Last 1 Encounters:   01/04/18 87.1 kg (192 lb 0.3 oz)     Exam:  General: intubated, sedated.  Eyes open, jostling around in bed.  Looks ill and somewhat toxic.    HEENT: NC/AT, eyes somewhat icteric, external occular movements intact  ET tube in place.  Cardiac: tachycardic RR, S1, S2.  No murmurs appreciated.  Pulmonary: intubated, on the ventilator.  Abdomen: soft, non-distended.  Bowel Sounds Present.    Extremities: thighs/legs somewhat mottled.  no deformities.    Skin: mottling of extremities.  Warm and Dry.  Neuro: sedated but eyes open, tracks intermittently.  No clonus or tremor.  Psych: sedated.    Data:  EKG:  Sinus rhythm, QTc mildly prolonged.    Imaging:  Recent Results (from the past 48 hour(s))   XR Chest Port 1 View    Narrative    XR CHEST PORT 1 VW 1/4/2018 10:51 PM    HISTORY: unresponsive; s/p ET placement & right central line;     COMPARISON: 12/29/2016.    FINDINGS :    Midline endotracheal tube approximately 3 cm above the flora. A right  central venous catheter tip in right atrium. NG identified extending  below the diaphragm, tip is not included on this study. The NG tube  has a fairly tortuous  course curving to the left at the level of the  flora.    Left basilar opacity which may represent infiltrate or atelectasis.  Probable small left pleural effusion. Mild vascular congestion. Postop  changes in the left chest.      Impression    IMPRESSION:   1. Midline ET tube.  2. Venous catheter in right atrium.  3. Vascular congestion and left lower lobe infiltrate. Small left  effusion..    MAYELIN GRACE MD   CT Head w/o Contrast    Narrative    CT HEAD W/O CONTRAST  1/4/2018 11:51 PM     HISTORY: Unresponsive. Altered mental status.    TECHNIQUE: Axial images of the head and coronal reformations without  IV contrast material. Radiation dose for this scan was reduced using  automated exposure control, adjustment of the mA and/or kV according  to patient size, or iterative reconstruction technique.    COMPARISON: 6/6/2006.    FINDINGS: No intracranial hemorrhage, mass or mass effect. No acute  infarct identified. No shift of midline structures. The ventricles are  symmetric. No skull fractures. Visualized paranasal sinuses are clear.      Impression    IMPRESSION: No acute intracranial findings.   CT Chest/Abdomen/Pelvis w Contrast    Narrative    CT CHEST/ABDOMEN/PELVIS W CONTRAST  1/4/2018 11:52 PM     HISTORY: CT chest for pulmonary embolus protocol.     TECHNIQUE: Volumetric acquisition through chest, abdomen and pelvis  with IV contrast. 71 mL Isovue-370. Radiation dose for this scan was  reduced using automated exposure control, adjustment of the mA and/or  kV according to patient size, or iterative reconstruction technique.    COMPARISON: None.    FINDINGS:   Chest: No visualized pulmonary embolism. No thoracic aortic aneurysm  or dissection. Mild cardiomegaly. Coronary artery calcifications.  Moderate bilateral lower lung infiltrates with consolidation and  slight volume loss. No significant pleural effusions. There is trace  pleural fluid on the right. ETT tip above the flora. Enteric tube  in  place.    Gallbladder is distended and contains multiple stones. Possible slight  gallbladder wall thickening. Prominent geographic area of low  attenuation in the left hepatic lobe which may be due to fatty  infiltration. Spleen, pancreas, adrenal glands and kidneys demonstrate  no worrisome findings. Postoperative changes involving the stomach.  Prominent gas-distended bowel loops in the left upper quadrant. Most  of the small bowel is fluid filled and mildly distended. Postoperative  changes involving small bowel loops. Subtotal colectomy.    Uterus is not seen. Urinary bladder is decompressed by a Fonseca  catheter. Trace free fluid in the pelvis. Moderate degenerative and  hypertrophic changes in the visualized spine.      Impression    IMPRESSION:  1. No visualized pulmonary embolism.  2. Moderate bibasilar opacities with some consolidation and volume  loss. This may be atelectasis and/or pneumonia.  3. Gallbladder is distended and contains multiple stones with possible  slight gallbladder wall thickening. Cholecystitis is possible.  Gallbladder ultrasound may be helpful.  4. Postoperative changes involving the stomach and small bowel loops.  Distended small bowel loops, most prominent in the upper abdomen.  Findings may be due to ileus but mechanical small bowel obstruction is  not excluded. Some of the more proximal distended loops could be due  to postoperative change.   5. Low-dense area in the left hepatic lobe, probably fatty  infiltration. Follow-up liver MRI at some point may be helpful to  confirm this.     Labs:    Recent Labs  Lab 01/04/18  2205 01/04/18  2200   WBC  --  6.7   HGB 9.9* 10.0*   HCT  --  29.9*   MCV  --  105*   PLT  --  173          Lab Results   Component Value Date     01/04/2018     01/04/2018     12/09/2017    Lab Results   Component Value Date    CHLORIDE 84 01/04/2018    CHLORIDE 88 01/04/2018    CHLORIDE 96 12/09/2017    Lab Results   Component Value Date     BUN 30 01/04/2018    BUN 33 01/04/2018    BUN 19 12/09/2017      Lab Results   Component Value Date    POTASSIUM 3.4 01/04/2018    POTASSIUM 3.4 01/04/2018    POTASSIUM 3.4 12/09/2017    Lab Results   Component Value Date    CO2 16 01/04/2018    CO2 31 12/09/2017    CO2 22 07/17/2017    Lab Results   Component Value Date    CR 2.07 01/04/2018    CR 0.75 12/09/2017    CR 0.76 07/17/2017              Jimy Cabrera MD  Hospitalist  [facility]

## 2018-01-05 NOTE — TELEPHONE ENCOUNTER
Received call from Oxana Valdez, pt 's sister.  Discussed course of events yesterday and this early am with pt's rapid decline and death.  I expressed my sincerest condolences.  No indication of any gallbladder issues when I last saw pt on 2017.  Looks like cause of death will be septic shock  from acute cholecystitis.  I can do the death certificate , if hospitalist staff not available to do so.  Oxana on way to visit with  home now.

## 2018-01-05 NOTE — PROGRESS NOTES
At 06:29 Code Blue was called for this patient.     Upon my arrival, CPR by staff was on-going. Dr. Enciso was present at the bedside aloon with multiple staff from Pharmacy, Nursing, lab.    For a brief time, the patient recovered a perfusing rhythm, but that lasted less than 5 minutes. In the interim, Huy Device was placed and was started at the time that pt's pulse again became non-palpable. Further resuscitative efforts, including further Bicarbonate, Epi and Calcium Chloride rounds were given, and despite these interventions, a spontaneous pulse did not return.     At 06:55, due to the refractory nature of the pulseless state, further efforts were ceased. Her electrical activity also stopped within about 30 seconds.     Pt is pronounced dead at 06:55. Family has been notified. The case will need to be reviewed by .    MD CAN Johnston DEATH PRONOUNCEMENT    Called to pronounce Stefany Vega dead.    Physical Exam: Unresponsive to noxious stimuli, Spontaneous respirations absent, Breath sounds absent, Carotid pulse absent, Heart sounds absent and no electrical activity on telemetry monitor.     Patient was pronounced dead at 06:55 AM, 2018.    Active Problems:    Septic shock (H)       Infectious disease present?: YES (strongly suspected as the primary cause of death)     Communicable disease present? (examples: HIV, chicken pox, TB, Ebola, CJD) :  NO    Multi-drug resistant organism present? (example: MRSA): NO    Please consider an autopsy if any of the following exist:  YES Unexpected or unexplained death during or following any dental, medical, or surgical diagnostic treatment procedures.   NO Death of mother at or up to seven days after delivery.     NO All  and pediatric deaths.     YES Death where the cause is sufficiently obscure to delay completion of the death certificate.   NO Deaths in which autopsy would confirm a suspected illness/condition that would affect  surviving family members or recipients of transplanted organs.     The following deaths must be reported to the 's Office:  NO A death that may be due entirely or in part to any factors other than natural disease (recent surgery, recent trauma, suspected abuse/neglect).   NO A death that may be an accident, suicide, or homicide.     NO Any sudden, unexpected death in which there is no prior history of significant heart disease or any other condition associated with sudden death.   NO A death under suspicious, unusual, or unexpected circumstances.    NO Any death which is apparently due to natural causes but in which the  does not have a personal physician familiar with the patient s medical history, social, or environmental situation or the circumstances of the terminal event.   NO Any death apparently due to Sudden Infant Death Syndrome.     NO Deaths that occur during, in association with, or as consequences of a diagnostic, therapeutic, or anesthetic procedure.   NO Any death in which a fracture of a major bone has occurred within the past (6) six months.   NO A death of persons note seen by their physician within 120 days of demise.     NO Any death in which the  was an inmate of a public institution or was in the custody of Law Enforcement personnel.   NO  All unexpected deaths of children   NO Solid organ donors   NO Unidentified bodies   unknown Deaths of persons whose bodies are to be cremated or otherwise disposed of so that the bodies will later be unavailable for examination;   NO Deaths unattended by a physician outside of a licensed healthcare facility or licensed residential hospice program   YES Deaths occurring within 24 hours of arrival to a health care facility if death is unexpected.    NO Deaths associated with the decedent s employment.   NO Deaths attributed to acts of terrorism.   YES Any death in which there is uncertainty as to whether it is a medical examiner s care  should be discussed with the medical investigator.        Body disposition: Autopsy was discussed with family member:  Father by phone.  Permission for autopsy was obtained.

## 2018-01-05 NOTE — ED NOTES
Bed: ED33  Expected date: 1/4/18  Expected time: 9:42 PM  Means of arrival: Ambulance  Comments:  ?  Unresponsive

## 2018-01-05 NOTE — PROGRESS NOTES
ABG Drawn from arterial line patient on CMV 30/450/+5/100%.    Shruthi Whitehead  January 5, 2018.4:08 AM

## 2018-01-05 NOTE — PROGRESS NOTES
TELE ICU BRIEF NOTE    Pt admit with profound septic shock - suspect cholangitis/cholecystitis - Dr. Enciso at bedside. Patient on multiple pressors with continued lability. IR at bedside for drain placement     Will start stress dose steroids, vitamin C intermittent infusion and thiamine.    Discussed with Dr Fernie Leahy

## 2018-01-05 NOTE — ED NOTES
pt comes in with decreased LOC states not feeling well mottled. Lips/FINGER TIPS blue. had left shoulder surg on monday. pt BP ranged from 60-80systolic initial blood sugar was 48 - pt is not a diab. amp D50 given. narcan given naselly. pt had 2 min episode of unresponsivness with CPR,  SR followed at 2 min monitor check.

## 2018-01-05 NOTE — PROGRESS NOTES
On 2018 at 1057, in accordance with CMS rules, patient's name was placed on the hospital registry for restraint use within 24 hours of death. Patient experienced no injury related to 2-point wrist restraints. Restraints last documented on 2018 at 0600 were still in use when code blue was called on patient at 0629. Resuscitation was not successful and patient  at 0655. Patient  while in restraints and her death was not related to restraint use, but report will be sent to CMS regional office as required.    Report of a Hospital Death Associated With Restraint or Seclusion form emailed to [email or web address] on 2018.

## 2018-01-05 NOTE — PROGRESS NOTES
RT Note:      Patient intubated with 7.5 ET tube secured 25 at the lip. Placed on mechanical ventilator CMV/AC rate of 30, tidal volume 450, peep of 5, and 100% FIO2. Auscultated equal bilateral breath sounds. Please note, verbal MD order for respiratory rate of 30.    Will continue to follow and monitor.        Emeli Quintana, RRT

## 2018-01-06 ENCOUNTER — RESULTS ONLY (OUTPATIENT)
Dept: LAB | Age: 63
End: 2018-01-06

## 2018-01-07 LAB
BACTERIA SPEC CULT: ABNORMAL
BACTERIA SPEC CULT: NORMAL
Lab: ABNORMAL
SPECIMEN SOURCE: ABNORMAL
SPECIMEN SOURCE: NORMAL

## 2018-01-08 LAB
BACTERIA SPEC CULT: ABNORMAL
Lab: ABNORMAL
SPECIMEN SOURCE: ABNORMAL

## 2018-01-09 LAB
BACTERIA SPEC CULT: ABNORMAL
BACTERIA SPEC CULT: ABNORMAL
Lab: ABNORMAL
SPECIMEN SOURCE: ABNORMAL

## 2018-01-10 ENCOUNTER — DOCUMENTATION ONLY (OUTPATIENT)
Dept: SURGERY | Facility: CLINIC | Age: 63
End: 2018-01-10

## 2018-01-12 LAB
GLUCOSE BLDC GLUCOMTR-MCNC: 431 MG/DL (ref 70–99)
GLUCOSE BLDC GLUCOMTR-MCNC: <10 MG/DL (ref 70–99)

## 2018-01-13 PROBLEM — B96.20 E COLI BACTEREMIA: Status: ACTIVE | Noted: 2018-01-13

## 2018-01-13 PROBLEM — A41.50 GRAM NEGATIVE SEPTIC SHOCK (H): Status: ACTIVE | Noted: 2018-01-01

## 2018-01-13 PROBLEM — K80.80 OTHER CHOLELITHIASIS WITHOUT OBSTRUCTION: Status: ACTIVE | Noted: 2018-01-13

## 2018-01-13 PROBLEM — R65.20: Status: ACTIVE | Noted: 2018-01-13

## 2018-01-13 PROBLEM — R78.81 E COLI BACTEREMIA: Status: ACTIVE | Noted: 2018-01-13

## 2018-01-13 PROBLEM — A41.9: Status: ACTIVE | Noted: 2018-01-13

## 2018-01-13 PROBLEM — A49.8 INFECTION, KLEBSIELLA: Status: ACTIVE | Noted: 2018-01-13

## 2018-01-13 PROBLEM — N39.0 E. COLI URINARY TRACT INFECTION: Status: ACTIVE | Noted: 2018-01-13

## 2018-01-13 PROBLEM — B96.20 E. COLI URINARY TRACT INFECTION: Status: ACTIVE | Noted: 2018-01-13

## 2018-01-13 NOTE — DISCHARGE SUMMARY
[facility] Discharge/Death Summary    Stefany Vega MRN# [id number]   Age: 62 year old YOB: 1955     Date of Admission:  1/4/2018  Date of Death::  1/5/2018  9:11 AM  Admitting Physician:  Kleber Cabrera MD  Discharge Physician:  Connor Adame MD     Home clinic: [facility]          Admission Diagnoses:   Lactic acidosis [E87.2]  Respiratory insufficiency [R06.89]  Shock (H) [R57.9]  Encephalopathy [G93.40]  Shock liver [K72.00]  Elevated troponin [R74.8]  Acute kidney injury (H) [N17.9]  Calculus of gallbladder without cholecystitis without obstruction [K80.20]  Pneumonia of both lower lobes due to infectious organism [J18.9]          Discharge Diagnosis:   Principal Problem:    Gram negative septic shock (H)  Active Problems:    E coli bacteremia    Klebsiella bacteremia    E. coli urinary tract infection    Sepsis with multiple organ dysfunction (MOD) (H)         Procedures:   CXR  CT Head  CT Chest/Abd/Pelvis  US Abdomen, limited    Endotracheal intubation.  Right internal jugular central line placement.  Right femoral arterial line placement.          Hospital Course:   Ms. Vega came to attention in the emergency department late on January 4, 2018.  She apparently had not been feeling well at home and had come in by emergency medical services.  Upon arrival she was noted to be significantly hypotensive with systolic blood pressure 60-80 and initial blood glucose of 48.  Apparently on arrival in the emergency department she became unresponsive and CPR was initiated.  After 2 minutes she was noted to be in sinus rhythm.    Patient was subsequently intubated and put on mechanical ventilator.  She had lines placed for monitoring as well as for administration of medications and fluid.    Ms. Vega's prior medical history is significant for hypertension breast cancer status post mastectomy, obstructive sleep apnea and gastric bypass.  She also was  noted to have a lupus anticoagulant as well as thrombocytosis for which she was on hydroxyurea.  She had undergone what appeared to be an uncomplicated shoulder revision surgery on 2017.    Upon arrival in the emergency department Ms. Vega was appreciated to be in septic shock with an elevated lactic acid.  Dr. Heber Camarena managed her aggressively from the moment of presentation. She was vigorously resuscitated in the emergency department with volume as well as initiated on vasopressor medications.  She was also started empirically on vancomycin and meropenem.      Dr. Kleber Cabrera received her for internal medicine.  Further supportive therapies were initiated in the ICU.  Dr. Junior Enciso from the [facility] intensive care service came to the bedside early in the morning to assist in managing the unstable and critically ill patient.  Interventional radiology apparently came in and decompress the patient's gallbladder given the concern that this may have been a source of the patient's infection.  Evidently foul smelling fluid was drained from the patient's gallbladder.    Shortly after 6 AM the patient became pulseless.  A code was started with CPR and ongoing vigorous medical management.  Despite heroic efforts during which CPR was continued for more than 20 minutes, the patient remained without a spontaneous pulse.  At that point it was determined that the patient's capacity for survival was unacceptably low.  For that reason efforts were terminated and the patient was pronounced dead.          Discharge Disposition:   Patient  during this admission      Attestation:  Total time: 35 minutes    Connor Adame MD

## 2018-01-29 LAB
BACTERIA SPEC CULT: ABNORMAL
Lab: ABNORMAL
SPECIMEN SOURCE: ABNORMAL

## 2018-01-31 ENCOUNTER — TELEPHONE (OUTPATIENT)
Dept: PODIATRY | Facility: CLINIC | Age: 63
End: 2018-01-31

## 2018-01-31 NOTE — TELEPHONE ENCOUNTER
This patients sister called today wanting to give information about Stefany. She wanted to pass information on to Dr. Al.   Patient had surgery on 12/27/2017. She passed away on 1/05/2018. Sister would like a call back.

## 2018-01-31 NOTE — TELEPHONE ENCOUNTER
I called and spoke with Oxana. She just wanted me to relay that Vira had passed away.    Bryan Barrera, ATC

## 2018-02-19 LAB — COPATH REPORT: NORMAL

## 2019-12-17 NOTE — ANESTHESIA CARE TRANSFER NOTE
Patient: Stefany Vega    Procedure(s):  BIOPSY BONE MARROW     Diagnosis: progressive thrombocytosis, leukocytosis  Diagnosis Additional Information: No value filed.    Anesthesia Type:   MAC     Note:  Airway :Room Air  Patient transferred to:Phase II        Vitals: (Last set prior to Anesthesia Care Transfer)    CRNA VITALS  7/24/2017 0925 - 7/24/2017 1002      7/24/2017             NIBP: 124/78    NIBP Mean: 102                Electronically Signed By: ISAEL Lopez CRNA  July 24, 2017  10:02 AM  
stated
